# Patient Record
Sex: FEMALE | Race: BLACK OR AFRICAN AMERICAN | Employment: OTHER | URBAN - METROPOLITAN AREA
[De-identification: names, ages, dates, MRNs, and addresses within clinical notes are randomized per-mention and may not be internally consistent; named-entity substitution may affect disease eponyms.]

---

## 2017-01-13 ENCOUNTER — OFFICE VISIT (OUTPATIENT)
Dept: PRIMARY CARE CLINIC | Age: 82
End: 2017-01-13

## 2017-01-13 VITALS
SYSTOLIC BLOOD PRESSURE: 208 MMHG | HEART RATE: 80 BPM | BODY MASS INDEX: 22.1 KG/M2 | OXYGEN SATURATION: 97 % | DIASTOLIC BLOOD PRESSURE: 97 MMHG | TEMPERATURE: 97.5 F | WEIGHT: 132.8 LBS

## 2017-01-13 DIAGNOSIS — M81.0 OSTEOPOROSIS: ICD-10-CM

## 2017-01-13 DIAGNOSIS — I10 ESSENTIAL HYPERTENSION: ICD-10-CM

## 2017-01-13 DIAGNOSIS — I10 UNCONTROLLED HYPERTENSION: Primary | ICD-10-CM

## 2017-01-13 DIAGNOSIS — R22.31 AXILLARY MASS, RIGHT: ICD-10-CM

## 2017-01-13 PROCEDURE — G8399 PT W/DXA RESULTS DOCUMENT: HCPCS | Performed by: FAMILY MEDICINE

## 2017-01-13 PROCEDURE — 4040F PNEUMOC VAC/ADMIN/RCVD: CPT | Performed by: FAMILY MEDICINE

## 2017-01-13 PROCEDURE — 1123F ACP DISCUSS/DSCN MKR DOCD: CPT | Performed by: FAMILY MEDICINE

## 2017-01-13 PROCEDURE — G8427 DOCREV CUR MEDS BY ELIG CLIN: HCPCS | Performed by: FAMILY MEDICINE

## 2017-01-13 PROCEDURE — 1090F PRES/ABSN URINE INCON ASSESS: CPT | Performed by: FAMILY MEDICINE

## 2017-01-13 PROCEDURE — G8419 CALC BMI OUT NRM PARAM NOF/U: HCPCS | Performed by: FAMILY MEDICINE

## 2017-01-13 PROCEDURE — 99214 OFFICE O/P EST MOD 30 MIN: CPT | Performed by: FAMILY MEDICINE

## 2017-01-13 PROCEDURE — 1036F TOBACCO NON-USER: CPT | Performed by: FAMILY MEDICINE

## 2017-01-13 PROCEDURE — G8484 FLU IMMUNIZE NO ADMIN: HCPCS | Performed by: FAMILY MEDICINE

## 2017-01-13 RX ORDER — LOSARTAN POTASSIUM 100 MG/1
100 TABLET ORAL DAILY
Qty: 90 TABLET | Refills: 0 | Status: SHIPPED | OUTPATIENT
Start: 2017-01-13 | End: 2017-04-24 | Stop reason: SDUPTHER

## 2017-01-13 RX ORDER — METOPROLOL SUCCINATE 50 MG/1
50 TABLET, EXTENDED RELEASE ORAL DAILY
Qty: 30 TABLET | Refills: 3 | Status: SHIPPED | OUTPATIENT
Start: 2017-01-13 | End: 2017-02-10 | Stop reason: SDUPTHER

## 2017-01-13 RX ORDER — ALENDRONATE SODIUM 70 MG/1
70 TABLET ORAL
Qty: 4 TABLET | Refills: 3 | Status: SHIPPED | OUTPATIENT
Start: 2017-01-13 | End: 2017-05-06 | Stop reason: SDUPTHER

## 2017-01-13 ASSESSMENT — ENCOUNTER SYMPTOMS
RECTAL PAIN: 0
RHINORRHEA: 0
EYE PAIN: 0
WHEEZING: 0
CHOKING: 0
STRIDOR: 0
SHORTNESS OF BREATH: 0
BLOOD IN STOOL: 0
ABDOMINAL DISTENTION: 0
APNEA: 0
TROUBLE SWALLOWING: 0
CHEST TIGHTNESS: 0
NAUSEA: 0
SINUS PRESSURE: 0
EYE ITCHING: 0
EYE DISCHARGE: 0
PHOTOPHOBIA: 0
CONSTIPATION: 0
VOMITING: 0
BACK PAIN: 0
COUGH: 0
SORE THROAT: 0
COLOR CHANGE: 0
DIARRHEA: 0
EYE REDNESS: 0

## 2017-01-13 ASSESSMENT — PATIENT HEALTH QUESTIONNAIRE - PHQ9
1. LITTLE INTEREST OR PLEASURE IN DOING THINGS: 0
SUM OF ALL RESPONSES TO PHQ QUESTIONS 1-9: 0
2. FEELING DOWN, DEPRESSED OR HOPELESS: 0
SUM OF ALL RESPONSES TO PHQ9 QUESTIONS 1 & 2: 0

## 2017-02-10 ENCOUNTER — OFFICE VISIT (OUTPATIENT)
Dept: PRIMARY CARE CLINIC | Age: 82
End: 2017-02-10

## 2017-02-10 VITALS
OXYGEN SATURATION: 97 % | WEIGHT: 131.4 LBS | BODY MASS INDEX: 21.87 KG/M2 | SYSTOLIC BLOOD PRESSURE: 170 MMHG | HEART RATE: 75 BPM | DIASTOLIC BLOOD PRESSURE: 90 MMHG

## 2017-02-10 DIAGNOSIS — I10 UNCONTROLLED HYPERTENSION: ICD-10-CM

## 2017-02-10 PROCEDURE — 1090F PRES/ABSN URINE INCON ASSESS: CPT | Performed by: FAMILY MEDICINE

## 2017-02-10 PROCEDURE — 1036F TOBACCO NON-USER: CPT | Performed by: FAMILY MEDICINE

## 2017-02-10 PROCEDURE — G8419 CALC BMI OUT NRM PARAM NOF/U: HCPCS | Performed by: FAMILY MEDICINE

## 2017-02-10 PROCEDURE — G8427 DOCREV CUR MEDS BY ELIG CLIN: HCPCS | Performed by: FAMILY MEDICINE

## 2017-02-10 PROCEDURE — G8399 PT W/DXA RESULTS DOCUMENT: HCPCS | Performed by: FAMILY MEDICINE

## 2017-02-10 PROCEDURE — 99213 OFFICE O/P EST LOW 20 MIN: CPT | Performed by: FAMILY MEDICINE

## 2017-02-10 PROCEDURE — 4040F PNEUMOC VAC/ADMIN/RCVD: CPT | Performed by: FAMILY MEDICINE

## 2017-02-10 PROCEDURE — 1123F ACP DISCUSS/DSCN MKR DOCD: CPT | Performed by: FAMILY MEDICINE

## 2017-02-10 PROCEDURE — G8484 FLU IMMUNIZE NO ADMIN: HCPCS | Performed by: FAMILY MEDICINE

## 2017-02-10 RX ORDER — METOPROLOL SUCCINATE 50 MG/1
TABLET, EXTENDED RELEASE ORAL
Qty: 60 TABLET | Refills: 3 | Status: SHIPPED | OUTPATIENT
Start: 2017-02-10 | End: 2017-06-01 | Stop reason: SDUPTHER

## 2017-02-10 ASSESSMENT — ENCOUNTER SYMPTOMS
BLOOD IN STOOL: 0
EYE PAIN: 0
COUGH: 0
RHINORRHEA: 0
EYE ITCHING: 0
COLOR CHANGE: 0
BACK PAIN: 0
DIARRHEA: 0
NAUSEA: 0
STRIDOR: 0
VOMITING: 0
SHORTNESS OF BREATH: 0
ABDOMINAL DISTENTION: 0
EYE DISCHARGE: 0
SINUS PRESSURE: 0
WHEEZING: 0
APNEA: 0
SORE THROAT: 0
CHOKING: 0
RECTAL PAIN: 0
CONSTIPATION: 0
TROUBLE SWALLOWING: 0
PHOTOPHOBIA: 0
EYE REDNESS: 0
CHEST TIGHTNESS: 0

## 2017-03-10 ENCOUNTER — OFFICE VISIT (OUTPATIENT)
Dept: PRIMARY CARE CLINIC | Age: 82
End: 2017-03-10

## 2017-03-10 VITALS
DIASTOLIC BLOOD PRESSURE: 60 MMHG | BODY MASS INDEX: 21.83 KG/M2 | OXYGEN SATURATION: 97 % | SYSTOLIC BLOOD PRESSURE: 180 MMHG | HEART RATE: 74 BPM | WEIGHT: 131.2 LBS

## 2017-03-10 DIAGNOSIS — I10 UNCONTROLLED HYPERTENSION: Primary | ICD-10-CM

## 2017-03-10 PROCEDURE — 1036F TOBACCO NON-USER: CPT | Performed by: FAMILY MEDICINE

## 2017-03-10 PROCEDURE — G8427 DOCREV CUR MEDS BY ELIG CLIN: HCPCS | Performed by: FAMILY MEDICINE

## 2017-03-10 PROCEDURE — 99213 OFFICE O/P EST LOW 20 MIN: CPT | Performed by: FAMILY MEDICINE

## 2017-03-10 PROCEDURE — G8399 PT W/DXA RESULTS DOCUMENT: HCPCS | Performed by: FAMILY MEDICINE

## 2017-03-10 PROCEDURE — 1090F PRES/ABSN URINE INCON ASSESS: CPT | Performed by: FAMILY MEDICINE

## 2017-03-10 PROCEDURE — G8484 FLU IMMUNIZE NO ADMIN: HCPCS | Performed by: FAMILY MEDICINE

## 2017-03-10 PROCEDURE — 4040F PNEUMOC VAC/ADMIN/RCVD: CPT | Performed by: FAMILY MEDICINE

## 2017-03-10 PROCEDURE — G8419 CALC BMI OUT NRM PARAM NOF/U: HCPCS | Performed by: FAMILY MEDICINE

## 2017-03-10 PROCEDURE — 1123F ACP DISCUSS/DSCN MKR DOCD: CPT | Performed by: FAMILY MEDICINE

## 2017-03-10 RX ORDER — CHLORTHALIDONE 25 MG/1
TABLET ORAL
Qty: 30 TABLET | Refills: 3 | Status: SHIPPED | OUTPATIENT
Start: 2017-03-10 | End: 2017-06-30 | Stop reason: SDUPTHER

## 2017-03-10 ASSESSMENT — ENCOUNTER SYMPTOMS
WHEEZING: 0
ABDOMINAL DISTENTION: 0
CHEST TIGHTNESS: 0
DIARRHEA: 0
RECTAL PAIN: 0
NAUSEA: 0
EYE ITCHING: 0
CONSTIPATION: 0
BLURRED VISION: 0
EYE DISCHARGE: 0
PHOTOPHOBIA: 0
STRIDOR: 0
EYE REDNESS: 0
SINUS PRESSURE: 0
COLOR CHANGE: 0
CHOKING: 0
COUGH: 0
TROUBLE SWALLOWING: 0
VOMITING: 0
APNEA: 0
ORTHOPNEA: 0
EYE PAIN: 0
BACK PAIN: 0
SORE THROAT: 0
BLOOD IN STOOL: 0
RHINORRHEA: 0
SHORTNESS OF BREATH: 0

## 2017-04-24 DIAGNOSIS — I10 UNCONTROLLED HYPERTENSION: ICD-10-CM

## 2017-04-25 RX ORDER — LOSARTAN POTASSIUM 100 MG/1
100 TABLET ORAL DAILY
Qty: 90 TABLET | Refills: 0 | Status: SHIPPED | OUTPATIENT
Start: 2017-04-25 | End: 2017-06-30 | Stop reason: SDUPTHER

## 2017-04-28 ENCOUNTER — OFFICE VISIT (OUTPATIENT)
Dept: PRIMARY CARE CLINIC | Age: 82
End: 2017-04-28

## 2017-04-28 VITALS
BODY MASS INDEX: 21.8 KG/M2 | DIASTOLIC BLOOD PRESSURE: 78 MMHG | SYSTOLIC BLOOD PRESSURE: 178 MMHG | HEART RATE: 79 BPM | OXYGEN SATURATION: 98 % | WEIGHT: 131 LBS

## 2017-04-28 DIAGNOSIS — M75.51 BURSITIS, SHOULDER, RIGHT: Primary | ICD-10-CM

## 2017-04-28 DIAGNOSIS — I10 ESSENTIAL HYPERTENSION: ICD-10-CM

## 2017-04-28 PROCEDURE — 1090F PRES/ABSN URINE INCON ASSESS: CPT | Performed by: FAMILY MEDICINE

## 2017-04-28 PROCEDURE — 99213 OFFICE O/P EST LOW 20 MIN: CPT | Performed by: FAMILY MEDICINE

## 2017-04-28 PROCEDURE — G8427 DOCREV CUR MEDS BY ELIG CLIN: HCPCS | Performed by: FAMILY MEDICINE

## 2017-04-28 PROCEDURE — 4040F PNEUMOC VAC/ADMIN/RCVD: CPT | Performed by: FAMILY MEDICINE

## 2017-04-28 PROCEDURE — 1036F TOBACCO NON-USER: CPT | Performed by: FAMILY MEDICINE

## 2017-04-28 PROCEDURE — G8399 PT W/DXA RESULTS DOCUMENT: HCPCS | Performed by: FAMILY MEDICINE

## 2017-04-28 PROCEDURE — 1123F ACP DISCUSS/DSCN MKR DOCD: CPT | Performed by: FAMILY MEDICINE

## 2017-04-28 PROCEDURE — G8419 CALC BMI OUT NRM PARAM NOF/U: HCPCS | Performed by: FAMILY MEDICINE

## 2017-04-28 ASSESSMENT — ENCOUNTER SYMPTOMS
WHEEZING: 0
PHOTOPHOBIA: 0
CHEST TIGHTNESS: 0
DIARRHEA: 0
SINUS PRESSURE: 0
VOMITING: 0
STRIDOR: 0
SORE THROAT: 0
RECTAL PAIN: 0
ABDOMINAL DISTENTION: 0
RHINORRHEA: 0
NAUSEA: 0
COLOR CHANGE: 0
TROUBLE SWALLOWING: 0
COUGH: 0
EYE ITCHING: 0
APNEA: 0
SHORTNESS OF BREATH: 0
EYE REDNESS: 0
CHOKING: 0
BLOOD IN STOOL: 0
EYE DISCHARGE: 0
BACK PAIN: 0
EYE PAIN: 0
CONSTIPATION: 0

## 2017-05-25 ENCOUNTER — OFFICE VISIT (OUTPATIENT)
Dept: PRIMARY CARE CLINIC | Age: 82
End: 2017-05-25

## 2017-05-25 ENCOUNTER — OFFICE VISIT (OUTPATIENT)
Dept: ORTHOPEDIC SURGERY | Age: 82
End: 2017-05-25

## 2017-05-25 VITALS
OXYGEN SATURATION: 98 % | TEMPERATURE: 97.3 F | RESPIRATION RATE: 18 BRPM | DIASTOLIC BLOOD PRESSURE: 82 MMHG | BODY MASS INDEX: 22.53 KG/M2 | HEIGHT: 64 IN | SYSTOLIC BLOOD PRESSURE: 170 MMHG | WEIGHT: 132 LBS | HEART RATE: 57 BPM

## 2017-05-25 VITALS
WEIGHT: 132 LBS | DIASTOLIC BLOOD PRESSURE: 106 MMHG | BODY MASS INDEX: 23.39 KG/M2 | HEART RATE: 73 BPM | HEIGHT: 63 IN | RESPIRATION RATE: 16 BRPM | TEMPERATURE: 97.8 F | SYSTOLIC BLOOD PRESSURE: 224 MMHG

## 2017-05-25 DIAGNOSIS — G89.29 CHRONIC RIGHT SHOULDER PAIN: ICD-10-CM

## 2017-05-25 DIAGNOSIS — M25.511 CHRONIC RIGHT SHOULDER PAIN: ICD-10-CM

## 2017-05-25 DIAGNOSIS — I10 ESSENTIAL HYPERTENSION: Primary | ICD-10-CM

## 2017-05-25 DIAGNOSIS — M25.511 ACUTE PAIN OF RIGHT SHOULDER: Primary | ICD-10-CM

## 2017-05-25 DIAGNOSIS — I10 ESSENTIAL HYPERTENSION: ICD-10-CM

## 2017-05-25 LAB
A/G RATIO: 1.4 (ref 1.1–2.2)
ALBUMIN SERPL-MCNC: 4.7 G/DL (ref 3.4–5)
ALP BLD-CCNC: 67 U/L (ref 40–129)
ALT SERPL-CCNC: 17 U/L (ref 10–40)
ANION GAP SERPL CALCULATED.3IONS-SCNC: 16 MMOL/L (ref 3–16)
AST SERPL-CCNC: 24 U/L (ref 15–37)
BASOPHILS ABSOLUTE: 0 K/UL (ref 0–0.2)
BASOPHILS RELATIVE PERCENT: 0.5 %
BILIRUB SERPL-MCNC: 0.4 MG/DL (ref 0–1)
BILIRUBIN URINE: NEGATIVE
BLOOD, URINE: NEGATIVE
BUN BLDV-MCNC: 14 MG/DL (ref 7–20)
CALCIUM SERPL-MCNC: 9.6 MG/DL (ref 8.3–10.6)
CHLORIDE BLD-SCNC: 104 MMOL/L (ref 99–110)
CLARITY: CLEAR
CO2: 25 MMOL/L (ref 21–32)
COLOR: YELLOW
CREAT SERPL-MCNC: 0.7 MG/DL (ref 0.6–1.2)
EOSINOPHILS ABSOLUTE: 0.2 K/UL (ref 0–0.6)
EOSINOPHILS RELATIVE PERCENT: 3.6 %
GFR AFRICAN AMERICAN: >60
GFR NON-AFRICAN AMERICAN: >60
GLOBULIN: 3.4 G/DL
GLUCOSE BLD-MCNC: 91 MG/DL (ref 70–99)
GLUCOSE URINE: NEGATIVE MG/DL
HCT VFR BLD CALC: 44.6 % (ref 36–48)
HEMOGLOBIN: 14.2 G/DL (ref 12–16)
KETONES, URINE: NEGATIVE MG/DL
LEUKOCYTE ESTERASE, URINE: NEGATIVE
LYMPHOCYTES ABSOLUTE: 1.7 K/UL (ref 1–5.1)
LYMPHOCYTES RELATIVE PERCENT: 36.3 %
MAGNESIUM: 2.6 MG/DL (ref 1.8–2.4)
MCH RBC QN AUTO: 27 PG (ref 26–34)
MCHC RBC AUTO-ENTMCNC: 31.7 G/DL (ref 31–36)
MCV RBC AUTO: 85.3 FL (ref 80–100)
MICROSCOPIC EXAMINATION: NORMAL
MONOCYTES ABSOLUTE: 0.4 K/UL (ref 0–1.3)
MONOCYTES RELATIVE PERCENT: 8.8 %
NEUTROPHILS ABSOLUTE: 2.3 K/UL (ref 1.7–7.7)
NEUTROPHILS RELATIVE PERCENT: 50.8 %
NITRITE, URINE: NEGATIVE
PDW BLD-RTO: 15.3 % (ref 12.4–15.4)
PH UA: 6.5
PLATELET # BLD: 216 K/UL (ref 135–450)
PMV BLD AUTO: 9.6 FL (ref 5–10.5)
POTASSIUM SERPL-SCNC: 4.5 MMOL/L (ref 3.5–5.1)
PROTEIN UA: NEGATIVE MG/DL
RBC # BLD: 5.23 M/UL (ref 4–5.2)
SODIUM BLD-SCNC: 145 MMOL/L (ref 136–145)
SPECIFIC GRAVITY UA: 1.01
TOTAL PROTEIN: 8.1 G/DL (ref 6.4–8.2)
TSH REFLEX FT4: 1.99 UIU/ML (ref 0.27–4.2)
URINE TYPE: NORMAL
UROBILINOGEN, URINE: 0.2 E.U./DL
WBC # BLD: 4.6 K/UL (ref 4–11)

## 2017-05-25 PROCEDURE — 1123F ACP DISCUSS/DSCN MKR DOCD: CPT | Performed by: INTERNAL MEDICINE

## 2017-05-25 PROCEDURE — G8399 PT W/DXA RESULTS DOCUMENT: HCPCS | Performed by: INTERNAL MEDICINE

## 2017-05-25 PROCEDURE — G8427 DOCREV CUR MEDS BY ELIG CLIN: HCPCS | Performed by: INTERNAL MEDICINE

## 2017-05-25 PROCEDURE — G8399 PT W/DXA RESULTS DOCUMENT: HCPCS | Performed by: PHYSICIAN ASSISTANT

## 2017-05-25 PROCEDURE — G8419 CALC BMI OUT NRM PARAM NOF/U: HCPCS | Performed by: INTERNAL MEDICINE

## 2017-05-25 PROCEDURE — 99213 OFFICE O/P EST LOW 20 MIN: CPT | Performed by: INTERNAL MEDICINE

## 2017-05-25 PROCEDURE — 4040F PNEUMOC VAC/ADMIN/RCVD: CPT | Performed by: PHYSICIAN ASSISTANT

## 2017-05-25 PROCEDURE — G8427 DOCREV CUR MEDS BY ELIG CLIN: HCPCS | Performed by: PHYSICIAN ASSISTANT

## 2017-05-25 PROCEDURE — 4040F PNEUMOC VAC/ADMIN/RCVD: CPT | Performed by: INTERNAL MEDICINE

## 2017-05-25 PROCEDURE — G8419 CALC BMI OUT NRM PARAM NOF/U: HCPCS | Performed by: PHYSICIAN ASSISTANT

## 2017-05-25 PROCEDURE — 1123F ACP DISCUSS/DSCN MKR DOCD: CPT | Performed by: PHYSICIAN ASSISTANT

## 2017-05-25 PROCEDURE — 20610 DRAIN/INJ JOINT/BURSA W/O US: CPT | Performed by: PHYSICIAN ASSISTANT

## 2017-05-25 PROCEDURE — 1036F TOBACCO NON-USER: CPT | Performed by: INTERNAL MEDICINE

## 2017-05-25 PROCEDURE — 1090F PRES/ABSN URINE INCON ASSESS: CPT | Performed by: INTERNAL MEDICINE

## 2017-05-25 PROCEDURE — 99202 OFFICE O/P NEW SF 15 MIN: CPT | Performed by: PHYSICIAN ASSISTANT

## 2017-05-25 PROCEDURE — 1036F TOBACCO NON-USER: CPT | Performed by: PHYSICIAN ASSISTANT

## 2017-05-25 PROCEDURE — 1090F PRES/ABSN URINE INCON ASSESS: CPT | Performed by: PHYSICIAN ASSISTANT

## 2017-05-25 ASSESSMENT — ENCOUNTER SYMPTOMS
RESPIRATORY NEGATIVE: 1
FACIAL SWELLING: 0
BLURRED VISION: 0
EYES NEGATIVE: 1
ORTHOPNEA: 0
SHORTNESS OF BREATH: 0

## 2017-05-27 PROBLEM — M25.511 ACUTE PAIN OF RIGHT SHOULDER: Status: ACTIVE | Noted: 2017-05-27

## 2017-05-31 DIAGNOSIS — I10 UNCONTROLLED HYPERTENSION: ICD-10-CM

## 2017-06-02 RX ORDER — METOPROLOL SUCCINATE 50 MG/1
TABLET, EXTENDED RELEASE ORAL
Qty: 60 TABLET | Refills: 3 | Status: SHIPPED | OUTPATIENT
Start: 2017-06-02 | End: 2017-09-22 | Stop reason: SDUPTHER

## 2017-06-09 ENCOUNTER — TELEPHONE (OUTPATIENT)
Dept: PRIMARY CARE CLINIC | Age: 82
End: 2017-06-09

## 2017-06-10 ENCOUNTER — TELEPHONE (OUTPATIENT)
Dept: PRIMARY CARE CLINIC | Age: 82
End: 2017-06-10

## 2017-06-10 DIAGNOSIS — E55.9 VITAMIN D DEFICIENCY: Primary | ICD-10-CM

## 2017-06-30 ENCOUNTER — OFFICE VISIT (OUTPATIENT)
Dept: PRIMARY CARE CLINIC | Age: 82
End: 2017-06-30

## 2017-06-30 VITALS
WEIGHT: 127.4 LBS | TEMPERATURE: 97.6 F | SYSTOLIC BLOOD PRESSURE: 145 MMHG | HEART RATE: 79 BPM | BODY MASS INDEX: 21.87 KG/M2 | OXYGEN SATURATION: 97 % | DIASTOLIC BLOOD PRESSURE: 85 MMHG | RESPIRATION RATE: 14 BRPM

## 2017-06-30 DIAGNOSIS — R63.4 WEIGHT LOSS: Primary | ICD-10-CM

## 2017-06-30 DIAGNOSIS — I10 ESSENTIAL HYPERTENSION: ICD-10-CM

## 2017-06-30 DIAGNOSIS — I10 UNCONTROLLED HYPERTENSION: ICD-10-CM

## 2017-06-30 DIAGNOSIS — R22.31 AXILLARY MASS, RIGHT: ICD-10-CM

## 2017-06-30 DIAGNOSIS — M81.0 OSTEOPOROSIS: ICD-10-CM

## 2017-06-30 LAB
BASOPHILS ABSOLUTE: 0 K/UL (ref 0–0.2)
BASOPHILS RELATIVE PERCENT: 0.5 %
EOSINOPHILS ABSOLUTE: 0.1 K/UL (ref 0–0.6)
EOSINOPHILS RELATIVE PERCENT: 1.8 %
HCT VFR BLD CALC: 42.3 % (ref 36–48)
HEMOGLOBIN: 14 G/DL (ref 12–16)
LYMPHOCYTES ABSOLUTE: 1.8 K/UL (ref 1–5.1)
LYMPHOCYTES RELATIVE PERCENT: 37.8 %
MCH RBC QN AUTO: 27.9 PG (ref 26–34)
MCHC RBC AUTO-ENTMCNC: 33.1 G/DL (ref 31–36)
MCV RBC AUTO: 84.5 FL (ref 80–100)
MONOCYTES ABSOLUTE: 0.4 K/UL (ref 0–1.3)
MONOCYTES RELATIVE PERCENT: 8.6 %
NEUTROPHILS ABSOLUTE: 2.4 K/UL (ref 1.7–7.7)
NEUTROPHILS RELATIVE PERCENT: 51.3 %
PDW BLD-RTO: 15.4 % (ref 12.4–15.4)
PLATELET # BLD: 207 K/UL (ref 135–450)
PMV BLD AUTO: 9.6 FL (ref 5–10.5)
RBC # BLD: 5.01 M/UL (ref 4–5.2)
WBC # BLD: 4.7 K/UL (ref 4–11)

## 2017-06-30 PROCEDURE — 1090F PRES/ABSN URINE INCON ASSESS: CPT | Performed by: FAMILY MEDICINE

## 2017-06-30 PROCEDURE — G8399 PT W/DXA RESULTS DOCUMENT: HCPCS | Performed by: FAMILY MEDICINE

## 2017-06-30 PROCEDURE — 1036F TOBACCO NON-USER: CPT | Performed by: FAMILY MEDICINE

## 2017-06-30 PROCEDURE — G8427 DOCREV CUR MEDS BY ELIG CLIN: HCPCS | Performed by: FAMILY MEDICINE

## 2017-06-30 PROCEDURE — 99214 OFFICE O/P EST MOD 30 MIN: CPT | Performed by: FAMILY MEDICINE

## 2017-06-30 PROCEDURE — G8420 CALC BMI NORM PARAMETERS: HCPCS | Performed by: FAMILY MEDICINE

## 2017-06-30 PROCEDURE — 1123F ACP DISCUSS/DSCN MKR DOCD: CPT | Performed by: FAMILY MEDICINE

## 2017-06-30 PROCEDURE — 4040F PNEUMOC VAC/ADMIN/RCVD: CPT | Performed by: FAMILY MEDICINE

## 2017-06-30 PROCEDURE — 4005F PHARM THX FOR OP RXD: CPT | Performed by: FAMILY MEDICINE

## 2017-06-30 RX ORDER — ALENDRONATE SODIUM 70 MG/1
TABLET ORAL
Qty: 4 TABLET | Refills: 12 | Status: SHIPPED | OUTPATIENT
Start: 2017-06-30 | End: 2018-06-30 | Stop reason: SDUPTHER

## 2017-06-30 RX ORDER — CHLORTHALIDONE 25 MG/1
TABLET ORAL
Qty: 30 TABLET | Refills: 3 | Status: SHIPPED | OUTPATIENT
Start: 2017-06-30 | End: 2017-10-27 | Stop reason: CLARIF

## 2017-06-30 RX ORDER — LOSARTAN POTASSIUM 100 MG/1
100 TABLET ORAL DAILY
Qty: 90 TABLET | Refills: 0 | Status: SHIPPED | OUTPATIENT
Start: 2017-06-30 | End: 2017-10-18 | Stop reason: SDUPTHER

## 2017-06-30 RX ORDER — METOPROLOL SUCCINATE 50 MG/1
TABLET, EXTENDED RELEASE ORAL
Qty: 60 TABLET | Refills: 3 | Status: CANCELLED | OUTPATIENT
Start: 2017-06-30

## 2017-06-30 RX ORDER — AMLODIPINE BESYLATE 10 MG/1
10 TABLET ORAL DAILY
Qty: 90 TABLET | Refills: 1 | Status: SHIPPED | OUTPATIENT
Start: 2017-06-30 | End: 2018-06-22

## 2017-06-30 ASSESSMENT — ENCOUNTER SYMPTOMS
RECTAL PAIN: 0
VOMITING: 0
COUGH: 0
NAUSEA: 0
SHORTNESS OF BREATH: 0
EYE ITCHING: 0
TROUBLE SWALLOWING: 0
WHEEZING: 0
CHOKING: 0
RHINORRHEA: 0
EYE REDNESS: 0
CONSTIPATION: 0
EYE PAIN: 0
CHEST TIGHTNESS: 0
APNEA: 0
EYE DISCHARGE: 0
SORE THROAT: 0
SINUS PRESSURE: 0
BACK PAIN: 0
ABDOMINAL DISTENTION: 0
STRIDOR: 0
DIARRHEA: 0
COLOR CHANGE: 0
BLOOD IN STOOL: 0
PHOTOPHOBIA: 0

## 2017-07-12 ENCOUNTER — TELEPHONE (OUTPATIENT)
Dept: SURGERY | Age: 82
End: 2017-07-12

## 2017-08-18 ENCOUNTER — OFFICE VISIT (OUTPATIENT)
Dept: SURGERY | Age: 82
End: 2017-08-18

## 2017-08-18 VITALS
WEIGHT: 133 LBS | DIASTOLIC BLOOD PRESSURE: 107 MMHG | HEART RATE: 78 BPM | TEMPERATURE: 97.7 F | BODY MASS INDEX: 22.16 KG/M2 | HEIGHT: 65 IN | OXYGEN SATURATION: 97 % | SYSTOLIC BLOOD PRESSURE: 205 MMHG

## 2017-08-18 DIAGNOSIS — R22.31 AXILLARY MASS, RIGHT: Primary | ICD-10-CM

## 2017-08-18 PROCEDURE — 4040F PNEUMOC VAC/ADMIN/RCVD: CPT | Performed by: SURGERY

## 2017-08-18 PROCEDURE — 1090F PRES/ABSN URINE INCON ASSESS: CPT | Performed by: SURGERY

## 2017-08-18 PROCEDURE — 1123F ACP DISCUSS/DSCN MKR DOCD: CPT | Performed by: SURGERY

## 2017-08-18 PROCEDURE — G8427 DOCREV CUR MEDS BY ELIG CLIN: HCPCS | Performed by: SURGERY

## 2017-08-18 PROCEDURE — 1036F TOBACCO NON-USER: CPT | Performed by: SURGERY

## 2017-08-18 PROCEDURE — G8399 PT W/DXA RESULTS DOCUMENT: HCPCS | Performed by: SURGERY

## 2017-08-18 PROCEDURE — G8420 CALC BMI NORM PARAMETERS: HCPCS | Performed by: SURGERY

## 2017-08-18 PROCEDURE — 99213 OFFICE O/P EST LOW 20 MIN: CPT | Performed by: SURGERY

## 2017-08-31 ENCOUNTER — TELEPHONE (OUTPATIENT)
Dept: SURGERY | Age: 82
End: 2017-08-31

## 2017-09-15 ENCOUNTER — HOSPITAL ENCOUNTER (OUTPATIENT)
Dept: ULTRASOUND IMAGING | Age: 82
Discharge: HOME | End: 2017-09-21
Attending: SURGERY | Admitting: SURGERY

## 2017-09-15 DIAGNOSIS — R22.31 LOCALIZED SWELLING, MASS AND LUMP, RIGHT UPPER LIMB: ICD-10-CM

## 2017-09-15 DIAGNOSIS — R22.31 AXILLARY MASS, RIGHT: ICD-10-CM

## 2017-09-22 ENCOUNTER — OFFICE VISIT (OUTPATIENT)
Dept: PRIMARY CARE CLINIC | Age: 82
End: 2017-09-22

## 2017-09-22 VITALS
OXYGEN SATURATION: 99 % | SYSTOLIC BLOOD PRESSURE: 218 MMHG | BODY MASS INDEX: 22.26 KG/M2 | HEIGHT: 65 IN | HEART RATE: 74 BPM | DIASTOLIC BLOOD PRESSURE: 92 MMHG | WEIGHT: 133.6 LBS

## 2017-09-22 DIAGNOSIS — I10 UNCONTROLLED HYPERTENSION: Primary | ICD-10-CM

## 2017-09-22 DIAGNOSIS — F03.90 DEMENTIA WITHOUT BEHAVIORAL DISTURBANCE, UNSPECIFIED DEMENTIA TYPE: ICD-10-CM

## 2017-09-22 PROCEDURE — 4040F PNEUMOC VAC/ADMIN/RCVD: CPT | Performed by: FAMILY MEDICINE

## 2017-09-22 PROCEDURE — 1090F PRES/ABSN URINE INCON ASSESS: CPT | Performed by: FAMILY MEDICINE

## 2017-09-22 PROCEDURE — G8427 DOCREV CUR MEDS BY ELIG CLIN: HCPCS | Performed by: FAMILY MEDICINE

## 2017-09-22 PROCEDURE — G8399 PT W/DXA RESULTS DOCUMENT: HCPCS | Performed by: FAMILY MEDICINE

## 2017-09-22 PROCEDURE — 99214 OFFICE O/P EST MOD 30 MIN: CPT | Performed by: FAMILY MEDICINE

## 2017-09-22 PROCEDURE — G8420 CALC BMI NORM PARAMETERS: HCPCS | Performed by: FAMILY MEDICINE

## 2017-09-22 PROCEDURE — 1123F ACP DISCUSS/DSCN MKR DOCD: CPT | Performed by: FAMILY MEDICINE

## 2017-09-22 PROCEDURE — 1036F TOBACCO NON-USER: CPT | Performed by: FAMILY MEDICINE

## 2017-09-22 RX ORDER — METOPROLOL SUCCINATE 50 MG/1
TABLET, EXTENDED RELEASE ORAL
Qty: 180 TABLET | Refills: 3 | Status: SHIPPED | OUTPATIENT
Start: 2017-09-22 | End: 2018-12-21 | Stop reason: SDUPTHER

## 2017-09-22 RX ORDER — DONEPEZIL HYDROCHLORIDE 5 MG/1
5 TABLET, FILM COATED ORAL NIGHTLY
Qty: 30 TABLET | Refills: 0 | Status: SHIPPED | OUTPATIENT
Start: 2017-09-22 | End: 2017-10-27 | Stop reason: SDUPTHER

## 2017-09-22 ASSESSMENT — ENCOUNTER SYMPTOMS
BLOOD IN STOOL: 0
VOMITING: 0
CHEST TIGHTNESS: 0
SINUS PRESSURE: 0
RHINORRHEA: 0
TROUBLE SWALLOWING: 0
COUGH: 0
STRIDOR: 0
ABDOMINAL DISTENTION: 0
EYE PAIN: 0
SHORTNESS OF BREATH: 0
SORE THROAT: 0
PHOTOPHOBIA: 0
DIARRHEA: 0
COLOR CHANGE: 0
NAUSEA: 0
EYE REDNESS: 0
EYE DISCHARGE: 0
CONSTIPATION: 0
APNEA: 0
BACK PAIN: 0
CHOKING: 0
WHEEZING: 0
RECTAL PAIN: 0
EYE ITCHING: 0

## 2017-10-18 DIAGNOSIS — I10 UNCONTROLLED HYPERTENSION: ICD-10-CM

## 2017-10-18 RX ORDER — LOSARTAN POTASSIUM 100 MG/1
TABLET ORAL
Qty: 90 TABLET | Refills: 0 | Status: SHIPPED | OUTPATIENT
Start: 2017-10-18 | End: 2018-01-21 | Stop reason: SDUPTHER

## 2017-10-27 ENCOUNTER — OFFICE VISIT (OUTPATIENT)
Dept: PRIMARY CARE CLINIC | Age: 82
End: 2017-10-27

## 2017-10-27 VITALS
TEMPERATURE: 97.4 F | WEIGHT: 131 LBS | SYSTOLIC BLOOD PRESSURE: 164 MMHG | HEART RATE: 100 BPM | BODY MASS INDEX: 21.8 KG/M2 | DIASTOLIC BLOOD PRESSURE: 84 MMHG

## 2017-10-27 DIAGNOSIS — F03.90 DEMENTIA WITHOUT BEHAVIORAL DISTURBANCE, UNSPECIFIED DEMENTIA TYPE: ICD-10-CM

## 2017-10-27 DIAGNOSIS — I10 ESSENTIAL HYPERTENSION: Primary | ICD-10-CM

## 2017-10-27 PROCEDURE — G8399 PT W/DXA RESULTS DOCUMENT: HCPCS | Performed by: FAMILY MEDICINE

## 2017-10-27 PROCEDURE — G8420 CALC BMI NORM PARAMETERS: HCPCS | Performed by: FAMILY MEDICINE

## 2017-10-27 PROCEDURE — 1090F PRES/ABSN URINE INCON ASSESS: CPT | Performed by: FAMILY MEDICINE

## 2017-10-27 PROCEDURE — 99213 OFFICE O/P EST LOW 20 MIN: CPT | Performed by: FAMILY MEDICINE

## 2017-10-27 PROCEDURE — G8484 FLU IMMUNIZE NO ADMIN: HCPCS | Performed by: FAMILY MEDICINE

## 2017-10-27 PROCEDURE — 1123F ACP DISCUSS/DSCN MKR DOCD: CPT | Performed by: FAMILY MEDICINE

## 2017-10-27 PROCEDURE — 1036F TOBACCO NON-USER: CPT | Performed by: FAMILY MEDICINE

## 2017-10-27 PROCEDURE — G8427 DOCREV CUR MEDS BY ELIG CLIN: HCPCS | Performed by: FAMILY MEDICINE

## 2017-10-27 PROCEDURE — 4040F PNEUMOC VAC/ADMIN/RCVD: CPT | Performed by: FAMILY MEDICINE

## 2017-10-27 RX ORDER — HYDROCHLOROTHIAZIDE 12.5 MG/1
12.5 CAPSULE, GELATIN COATED ORAL EVERY MORNING
Qty: 30 CAPSULE | Refills: 5 | Status: SHIPPED | OUTPATIENT
Start: 2017-10-27 | End: 2018-03-16 | Stop reason: DRUGHIGH

## 2017-10-27 RX ORDER — DONEPEZIL HYDROCHLORIDE 5 MG/1
TABLET, FILM COATED ORAL
Qty: 30 TABLET | Refills: 2 | Status: SHIPPED | OUTPATIENT
Start: 2017-10-27 | End: 2018-06-22

## 2017-10-27 ASSESSMENT — ENCOUNTER SYMPTOMS
BLOOD IN STOOL: 0
COLOR CHANGE: 0
EYE DISCHARGE: 0
NAUSEA: 0
SORE THROAT: 0
SHORTNESS OF BREATH: 0
EYE PAIN: 0
CONSTIPATION: 0
SINUS PRESSURE: 0
WHEEZING: 0
APNEA: 0
COUGH: 0
BACK PAIN: 0
VOMITING: 0
CHEST TIGHTNESS: 0
RECTAL PAIN: 0
ABDOMINAL DISTENTION: 0
TROUBLE SWALLOWING: 0
RHINORRHEA: 0
EYE ITCHING: 0
CHOKING: 0
PHOTOPHOBIA: 0
DIARRHEA: 0
EYE REDNESS: 0
STRIDOR: 0

## 2017-10-27 NOTE — PROGRESS NOTES
Subjective:      Patient ID: Audra Mendoza is a 80 y.o. female. The chief complaint(s)  Fu for bp, dementia  HPI  Fu for somewhat difficult to control bp  No ha or sob or cp today    Fu for dementia  aricept 5 mg when taken hs keeps her awak  Review of Systems   Constitutional: Negative for activity change, appetite change, chills, diaphoresis, fatigue and fever. HENT: Negative for congestion, dental problem, drooling, ear discharge, ear pain, hearing loss, mouth sores, nosebleeds, postnasal drip, rhinorrhea, sinus pressure, sneezing, sore throat, tinnitus and trouble swallowing. Eyes: Negative for photophobia, pain, discharge, redness, itching and visual disturbance. Respiratory: Negative for apnea, cough, choking, chest tightness, shortness of breath, wheezing and stridor. Cardiovascular: Negative for chest pain, palpitations and leg swelling. Gastrointestinal: Negative for abdominal distention, blood in stool, constipation, diarrhea, nausea, rectal pain and vomiting. Genitourinary: Negative for decreased urine volume, difficulty urinating, dyspareunia, dysuria, enuresis, flank pain, frequency, genital sores, hematuria, menstrual problem, pelvic pain, urgency, vaginal bleeding and vaginal pain. Musculoskeletal: Negative for arthralgias, back pain, gait problem, joint swelling, myalgias, neck pain and neck stiffness. Skin: Negative for color change, pallor, rash and wound. Neurological: Negative for dizziness, tremors, seizures, syncope, facial asymmetry, speech difficulty, weakness, light-headedness, numbness and headaches. Hematological: Negative for adenopathy. Does not bruise/bleed easily. Psychiatric/Behavioral: Negative for agitation, behavioral problems, confusion, decreased concentration, dysphoric mood, hallucinations, self-injury, sleep disturbance and suicidal ideas. The patient is not nervous/anxious and is not hyperactive.         Objective:   Physical Exam   Constitutional: She is oriented to person, place, and time. Cardiovascular: Normal rate, regular rhythm, normal heart sounds and intact distal pulses. Exam reveals no gallop and no friction rub. No murmur heard. Pulmonary/Chest: Effort normal and breath sounds normal. No respiratory distress. She has no wheezes. She has no rales. She exhibits no tenderness. Neurological: She is oriented to person, place, and time. She has normal reflexes. She displays normal reflexes. No cranial nerve deficit. Coordination normal.   Nursing note and vitals reviewed. Assessment:      1. Essential hypertension  Not quite at goal  Less 150/90  Add hctz 12.5 mg    2. Dementia without behavioral disturbance, unspecified dementia type  Take aricept in am  - donepezil (ARICEPT) 5 MG tablet; Take one tablet in daytime  Dispense: 30 tablet;  Refill: 2          Plan:            See me in 2 months

## 2017-12-22 ENCOUNTER — OFFICE VISIT (OUTPATIENT)
Dept: PRIMARY CARE CLINIC | Age: 82
End: 2017-12-22

## 2017-12-22 VITALS
SYSTOLIC BLOOD PRESSURE: 142 MMHG | DIASTOLIC BLOOD PRESSURE: 78 MMHG | BODY MASS INDEX: 21.38 KG/M2 | HEART RATE: 88 BPM | TEMPERATURE: 98.6 F | OXYGEN SATURATION: 96 % | WEIGHT: 133 LBS | HEIGHT: 66 IN

## 2017-12-22 DIAGNOSIS — Z23 NEED FOR PROPHYLACTIC VACCINATION AGAINST STREPTOCOCCUS PNEUMONIAE (PNEUMOCOCCUS): ICD-10-CM

## 2017-12-22 DIAGNOSIS — G30.1 LATE ONSET ALZHEIMER'S DISEASE WITHOUT BEHAVIORAL DISTURBANCE (HCC): ICD-10-CM

## 2017-12-22 DIAGNOSIS — Z23 NEED FOR PROPHYLACTIC VACCINATION AGAINST DIPHTHERIA-TETANUS-PERTUSSIS (DTP): ICD-10-CM

## 2017-12-22 DIAGNOSIS — F02.80 LATE ONSET ALZHEIMER'S DISEASE WITHOUT BEHAVIORAL DISTURBANCE (HCC): ICD-10-CM

## 2017-12-22 DIAGNOSIS — I10 ESSENTIAL HYPERTENSION: Primary | ICD-10-CM

## 2017-12-22 PROCEDURE — 99213 OFFICE O/P EST LOW 20 MIN: CPT | Performed by: FAMILY MEDICINE

## 2017-12-22 PROCEDURE — G8399 PT W/DXA RESULTS DOCUMENT: HCPCS | Performed by: FAMILY MEDICINE

## 2017-12-22 PROCEDURE — 1036F TOBACCO NON-USER: CPT | Performed by: FAMILY MEDICINE

## 2017-12-22 PROCEDURE — G8484 FLU IMMUNIZE NO ADMIN: HCPCS | Performed by: FAMILY MEDICINE

## 2017-12-22 PROCEDURE — 1123F ACP DISCUSS/DSCN MKR DOCD: CPT | Performed by: FAMILY MEDICINE

## 2017-12-22 PROCEDURE — G8427 DOCREV CUR MEDS BY ELIG CLIN: HCPCS | Performed by: FAMILY MEDICINE

## 2017-12-22 PROCEDURE — 1090F PRES/ABSN URINE INCON ASSESS: CPT | Performed by: FAMILY MEDICINE

## 2017-12-22 PROCEDURE — G8420 CALC BMI NORM PARAMETERS: HCPCS | Performed by: FAMILY MEDICINE

## 2017-12-22 PROCEDURE — 4040F PNEUMOC VAC/ADMIN/RCVD: CPT | Performed by: FAMILY MEDICINE

## 2017-12-22 ASSESSMENT — ENCOUNTER SYMPTOMS
COUGH: 0
EYE DISCHARGE: 0
SHORTNESS OF BREATH: 0
PHOTOPHOBIA: 0
EYE REDNESS: 0
TROUBLE SWALLOWING: 0
SINUS PRESSURE: 0
NAUSEA: 0
BACK PAIN: 0
BLOOD IN STOOL: 0
DIARRHEA: 0
CONSTIPATION: 0
RECTAL PAIN: 0
COLOR CHANGE: 0
WHEEZING: 0
VOMITING: 0
RHINORRHEA: 0
STRIDOR: 0
CHEST TIGHTNESS: 0
EYE ITCHING: 0
CHOKING: 0
SORE THROAT: 0
APNEA: 0
ABDOMINAL DISTENTION: 0
EYE PAIN: 0

## 2017-12-22 NOTE — PROGRESS NOTES
Subjective:      Patient ID: Dawson Enriquez is a 80 y.o. female. The chief complaint(s)  Fu for bp & cognitive issues  HPI  She is fu visit for blood pressure  No headaches or sob or chest pains    She has some dementia  Basically lives along with close support  From daughter who is here with her today  Review of Systems   Constitutional: Negative for activity change, appetite change, chills, diaphoresis, fatigue and fever. HENT: Negative for congestion, dental problem, drooling, ear discharge, ear pain, hearing loss, mouth sores, nosebleeds, postnasal drip, rhinorrhea, sinus pressure, sneezing, sore throat, tinnitus and trouble swallowing. Eyes: Negative for photophobia, pain, discharge, redness, itching and visual disturbance. Respiratory: Negative for apnea, cough, choking, chest tightness, shortness of breath, wheezing and stridor. Cardiovascular: Negative for chest pain, palpitations and leg swelling. Gastrointestinal: Negative for abdominal distention, blood in stool, constipation, diarrhea, nausea, rectal pain and vomiting. Genitourinary: Negative for decreased urine volume, difficulty urinating, dyspareunia, dysuria, enuresis, flank pain, frequency, genital sores, hematuria, menstrual problem, pelvic pain, urgency, vaginal bleeding and vaginal pain. Musculoskeletal: Negative for arthralgias, back pain, gait problem, joint swelling, myalgias, neck pain and neck stiffness. Skin: Negative for color change, pallor, rash and wound. Neurological: Negative for dizziness, tremors, seizures, syncope, facial asymmetry, speech difficulty, weakness, light-headedness, numbness and headaches. Hematological: Negative for adenopathy. Does not bruise/bleed easily. Psychiatric/Behavioral: Negative for agitation, behavioral problems, confusion, decreased concentration, dysphoric mood, hallucinations, self-injury, sleep disturbance and suicidal ideas.  The patient is not nervous/anxious and is not the muscle once for 1 dose  Dispense: 0.5 mL; Refill: 0    4. Need for prophylactic vaccination against diphtheria-tetanus-pertussis (DTP)  - Tdap (ADACEL) 5-2-15.5 LF-MCG/0.5 injection;  Inject 0.5 mLs into the muscle once for 1 dose  Dispense: 0.5 mL; Refill: 0          Plan:         see me in 3 months

## 2017-12-22 NOTE — PATIENT INSTRUCTIONS
Patient Education        Pneumococcal Polysaccharide Vaccine: What You Need to Know  Why get vaccinated? Vaccination can protect older adults (and some children and younger adults) from pneumococcal disease. Pneumococcal disease is caused by bacteria that can spread from person to person through close contact. It can cause ear infections, and it can also lead to more serious infections of the:  · Lungs (pneumonia),  · Blood (bacteremia), and  · Covering of the brain and spinal cord (meningitis). Meningitis can cause deafness and brain damage, and it can be fatal.  Anyone can get pneumococcal disease, but children under 3years of age, people with certain medical conditions, adults over 72years of age, and cigarette smokers are at the highest risk. About 18,000 older adults die each year from pneumococcal disease in the [place]. Treatment of pneumococcal infections with penicillin and other drugs used to be more effective. But some strains of the disease have become resistant to these drugs. This makes prevention of the disease, through vaccination, even more important. Pneumococcal polysaccharide vaccine (PPSV23)  Pneumococcal polysaccharide vaccine (PPSV23) protects against 23 types of pneumococcal bacteria. It will not prevent all pneumococcal disease. PPSV23 is recommended for:  · All adults 72years of age and older,  · Anyone 2 through 59years of age with certain long-term health problems,  · Anyone 2 through 59years of age with a weakened immune system,  · Adults 23 through 59years of age who smoke cigarettes or have asthma. Most people need only one dose of PPSV. A second dose is recommended for certain high-risk groups. People 72 and older should get a dose even if they have gotten one or more doses of the vaccine before they turned 65. Your healthcare provider can give you more information about these recommendations.   Most healthy adults develop protection within 2 to 3 weeks of getting safety of vaccines is always being monitored. For more information, visit: www.cdc.gov/vaccinesafety/  What if there is a serious reaction? What should I look for? Look for anything that concerns you, such as signs of a severe allergic reaction, very high fever, or unusual behavior. Signs of a severe allergic reaction can include hives, swelling of the face and throat, difficulty breathing, a fast heartbeat, dizziness, and weakness. These would usually start a few minutes to a few hours after the vaccination. What should I do? If you think it is a severe allergic reaction or other emergency that can't wait, call 9-1-1 or get to the nearest hospital. Otherwise, call your doctor. Afterward, the reaction should be reported to the Vaccine Adverse Event Reporting System (VAERS). Your doctor might file this report, or you can do it yourself through the VAERS web site at www.vaers. [place].gov, or by calling 8-744.906.7775. [facility] does not give medical advice. How can I learn more? · Ask your doctor. He or she can give you the vaccine package insert or suggest other sources of information. · Call your local or state health department. · Contact the Centers for Disease Control and Prevention (CDC):  ¨ Call 2-672.297.6290 (1-800-CDC-INFO) or  ¨ Visit CDC's website at www.cdc.gov/vaccines  Vaccine Information Statement  PPSV Vaccine  (04/24/2015)  Department of Health and Human Services  Centers for Disease Control and Prevention  Many Vaccine Information Statements are available in [place] and other languages. See www.immunize.org/vis. Hojas de información Sobre Vacunas están disponibles en español y en muchos otros idiomas. Visite Vitaly.si. Care instructions adapted under license by [facility] ([facility]).  If you have questions about a medical condition or this instruction, always ask your healthcare professional. [address] any warranty or liability for your use of this information.

## 2018-01-21 DIAGNOSIS — I10 UNCONTROLLED HYPERTENSION: ICD-10-CM

## 2018-01-23 RX ORDER — LOSARTAN POTASSIUM 100 MG/1
TABLET ORAL
Qty: 90 TABLET | Refills: 2 | Status: SHIPPED | OUTPATIENT
Start: 2018-01-23 | End: 2018-09-21 | Stop reason: SDUPTHER

## 2018-03-16 ENCOUNTER — OFFICE VISIT (OUTPATIENT)
Dept: PRIMARY CARE CLINIC | Age: 83
End: 2018-03-16

## 2018-03-16 VITALS
HEIGHT: 66 IN | DIASTOLIC BLOOD PRESSURE: 79 MMHG | OXYGEN SATURATION: 96 % | RESPIRATION RATE: 16 BRPM | SYSTOLIC BLOOD PRESSURE: 184 MMHG | TEMPERATURE: 97.9 F | WEIGHT: 133 LBS | BODY MASS INDEX: 21.38 KG/M2

## 2018-03-16 DIAGNOSIS — I10 UNCONTROLLED HYPERTENSION: Primary | ICD-10-CM

## 2018-03-16 PROCEDURE — 1090F PRES/ABSN URINE INCON ASSESS: CPT | Performed by: FAMILY MEDICINE

## 2018-03-16 PROCEDURE — 4040F PNEUMOC VAC/ADMIN/RCVD: CPT | Performed by: FAMILY MEDICINE

## 2018-03-16 PROCEDURE — 99214 OFFICE O/P EST MOD 30 MIN: CPT | Performed by: FAMILY MEDICINE

## 2018-03-16 PROCEDURE — 1123F ACP DISCUSS/DSCN MKR DOCD: CPT | Performed by: FAMILY MEDICINE

## 2018-03-16 PROCEDURE — 1036F TOBACCO NON-USER: CPT | Performed by: FAMILY MEDICINE

## 2018-03-16 PROCEDURE — G8420 CALC BMI NORM PARAMETERS: HCPCS | Performed by: FAMILY MEDICINE

## 2018-03-16 PROCEDURE — G8482 FLU IMMUNIZE ORDER/ADMIN: HCPCS | Performed by: FAMILY MEDICINE

## 2018-03-16 PROCEDURE — G8399 PT W/DXA RESULTS DOCUMENT: HCPCS | Performed by: FAMILY MEDICINE

## 2018-03-16 PROCEDURE — G8427 DOCREV CUR MEDS BY ELIG CLIN: HCPCS | Performed by: FAMILY MEDICINE

## 2018-03-16 RX ORDER — HYDROCHLOROTHIAZIDE 25 MG/1
TABLET ORAL
Qty: 30 TABLET | Refills: 3 | Status: SHIPPED | OUTPATIENT
Start: 2018-03-16 | End: 2018-05-17 | Stop reason: SDUPTHER

## 2018-03-16 ASSESSMENT — ENCOUNTER SYMPTOMS
SORE THROAT: 0
PHOTOPHOBIA: 0
DIARRHEA: 0
CONSTIPATION: 0
WHEEZING: 0
SHORTNESS OF BREATH: 0
TROUBLE SWALLOWING: 0
COLOR CHANGE: 0
EYE PAIN: 0
BLOOD IN STOOL: 0
STRIDOR: 0
EYE DISCHARGE: 0
CHOKING: 0
RHINORRHEA: 0
BLURRED VISION: 0
APNEA: 0
EYE ITCHING: 0
BACK PAIN: 0
CHEST TIGHTNESS: 0
RECTAL PAIN: 0
COUGH: 0
SINUS PRESSURE: 0
ORTHOPNEA: 0
VOMITING: 0
EYE REDNESS: 0
NAUSEA: 0
ABDOMINAL DISTENTION: 0

## 2018-03-16 NOTE — PROGRESS NOTES
in daytime 30 tablet 2    metoprolol succinate (TOPROL XL) 50 MG extended release tablet Take one tablet twice a day and cancel script for once a day 180 tablet 3    amLODIPine (NORVASC) 10 MG tablet Take 1 tablet by mouth daily 90 tablet 1    alendronate (FOSAMAX) 70 MG tablet TAKE ONE TABLET BY MOUTH ONCE A WEEK 4 tablet 12    vitamin D (CHOLECALCIFEROL) 1000 UNIT TABS tablet Take 1,000 Units by mouth daily      aspirin 81 MG tablet Take 81 mg by mouth daily      Multiple Vitamin (MULTIVITAMINS PO) Take by mouth       No current facility-administered medications for this visit. Review of Systems   Constitutional: Negative for activity change, appetite change, chills, diaphoresis, fatigue, fever and malaise/fatigue. HENT: Negative for congestion, dental problem, drooling, ear discharge, ear pain, hearing loss, mouth sores, nosebleeds, postnasal drip, rhinorrhea, sinus pressure, sneezing, sore throat, tinnitus and trouble swallowing. Eyes: Negative for blurred vision, photophobia, pain, discharge, redness, itching and visual disturbance. Respiratory: Negative for apnea, cough, choking, chest tightness, shortness of breath, wheezing and stridor. Cardiovascular: Negative for chest pain, palpitations, orthopnea, leg swelling and PND. Gastrointestinal: Negative for abdominal distention, blood in stool, constipation, diarrhea, nausea, rectal pain and vomiting. Genitourinary: Negative for decreased urine volume, difficulty urinating, dyspareunia, dysuria, enuresis, flank pain, frequency, genital sores, hematuria, menstrual problem, pelvic pain, urgency, vaginal bleeding and vaginal pain. Musculoskeletal: Negative for arthralgias, back pain, gait problem, joint swelling, myalgias, neck pain and neck stiffness. Skin: Negative for color change, pallor, rash and wound.    Neurological: Negative for dizziness, tremors, seizures, syncope, facial asymmetry, speech difficulty, weakness, vitals reviewed. Assessment:        1. Uncontrolled hypertension  BP pressure remains uncontrolled    Will increase dose of hydrochlorothiazide to  25 mg a day  Will check renal function  Discussed low salt diet  - Basic Metabolic Panel;  Future              Plan:         see me in 2 months

## 2018-05-18 RX ORDER — HYDROCHLOROTHIAZIDE 25 MG/1
TABLET ORAL
Qty: 90 TABLET | Refills: 1 | Status: SHIPPED | OUTPATIENT
Start: 2018-05-18 | End: 2018-07-18 | Stop reason: SDUPTHER

## 2018-06-22 ENCOUNTER — OFFICE VISIT (OUTPATIENT)
Dept: PRIMARY CARE CLINIC | Age: 83
End: 2018-06-22

## 2018-06-22 VITALS
BODY MASS INDEX: 21.86 KG/M2 | HEIGHT: 66 IN | SYSTOLIC BLOOD PRESSURE: 163 MMHG | OXYGEN SATURATION: 99 % | WEIGHT: 136 LBS | DIASTOLIC BLOOD PRESSURE: 87 MMHG | TEMPERATURE: 96.6 F | HEART RATE: 80 BPM

## 2018-06-22 DIAGNOSIS — Z23 NEED FOR PROPHYLACTIC VACCINATION AND INOCULATION AGAINST VARICELLA: ICD-10-CM

## 2018-06-22 DIAGNOSIS — I10 UNCONTROLLED HYPERTENSION: ICD-10-CM

## 2018-06-22 DIAGNOSIS — I10 ESSENTIAL HYPERTENSION: ICD-10-CM

## 2018-06-22 DIAGNOSIS — Z23 NEED FOR PROPHYLACTIC VACCINATION AGAINST STREPTOCOCCUS PNEUMONIAE (PNEUMOCOCCUS): ICD-10-CM

## 2018-06-22 DIAGNOSIS — Z23 NEED FOR PROPHYLACTIC VACCINATION AGAINST DIPHTHERIA-TETANUS-PERTUSSIS (DTP): ICD-10-CM

## 2018-06-22 DIAGNOSIS — G30.1 LATE ONSET ALZHEIMER'S DISEASE WITHOUT BEHAVIORAL DISTURBANCE (HCC): Primary | ICD-10-CM

## 2018-06-22 DIAGNOSIS — F02.80 LATE ONSET ALZHEIMER'S DISEASE WITHOUT BEHAVIORAL DISTURBANCE (HCC): Primary | ICD-10-CM

## 2018-06-22 DIAGNOSIS — Z12.31 ENCOUNTER FOR MAMMOGRAM TO ESTABLISH BASELINE MAMMOGRAM: ICD-10-CM

## 2018-06-22 LAB
A/G RATIO: 1.5 (ref 1.1–2.2)
ALBUMIN SERPL-MCNC: 4.4 G/DL (ref 3.4–5)
ALP BLD-CCNC: 71 U/L (ref 40–129)
ALT SERPL-CCNC: 16 U/L (ref 10–40)
ANION GAP SERPL CALCULATED.3IONS-SCNC: 14 MMOL/L (ref 3–16)
AST SERPL-CCNC: 27 U/L (ref 15–37)
BASOPHILS ABSOLUTE: 0 K/UL (ref 0–0.2)
BASOPHILS RELATIVE PERCENT: 0.2 %
BILIRUB SERPL-MCNC: <0.2 MG/DL (ref 0–1)
BUN BLDV-MCNC: 16 MG/DL (ref 7–20)
CALCIUM SERPL-MCNC: 10 MG/DL (ref 8.3–10.6)
CHLORIDE BLD-SCNC: 101 MMOL/L (ref 99–110)
CO2: 28 MMOL/L (ref 21–32)
CREAT SERPL-MCNC: 0.8 MG/DL (ref 0.6–1.2)
EOSINOPHILS ABSOLUTE: 0.1 K/UL (ref 0–0.6)
EOSINOPHILS RELATIVE PERCENT: 1.4 %
GFR AFRICAN AMERICAN: >60
GFR NON-AFRICAN AMERICAN: >60
GLOBULIN: 2.9 G/DL
GLUCOSE BLD-MCNC: 91 MG/DL (ref 70–99)
HCT VFR BLD CALC: 39 % (ref 36–48)
HEMOGLOBIN: 13 G/DL (ref 12–16)
LYMPHOCYTES ABSOLUTE: 1.7 K/UL (ref 1–5.1)
LYMPHOCYTES RELATIVE PERCENT: 25.3 %
MCH RBC QN AUTO: 28.1 PG (ref 26–34)
MCHC RBC AUTO-ENTMCNC: 33.3 G/DL (ref 31–36)
MCV RBC AUTO: 84.5 FL (ref 80–100)
MONOCYTES ABSOLUTE: 0.6 K/UL (ref 0–1.3)
MONOCYTES RELATIVE PERCENT: 8.9 %
NEUTROPHILS ABSOLUTE: 4.3 K/UL (ref 1.7–7.7)
NEUTROPHILS RELATIVE PERCENT: 64.2 %
PDW BLD-RTO: 14.4 % (ref 12.4–15.4)
PLATELET # BLD: 205 K/UL (ref 135–450)
PMV BLD AUTO: 9 FL (ref 5–10.5)
POTASSIUM SERPL-SCNC: 5 MMOL/L (ref 3.5–5.1)
RBC # BLD: 4.62 M/UL (ref 4–5.2)
SODIUM BLD-SCNC: 143 MMOL/L (ref 136–145)
TOTAL PROTEIN: 7.3 G/DL (ref 6.4–8.2)
WBC # BLD: 6.7 K/UL (ref 4–11)

## 2018-06-22 PROCEDURE — 90732 PPSV23 VACC 2 YRS+ SUBQ/IM: CPT | Performed by: FAMILY MEDICINE

## 2018-06-22 PROCEDURE — 1090F PRES/ABSN URINE INCON ASSESS: CPT | Performed by: FAMILY MEDICINE

## 2018-06-22 PROCEDURE — 1036F TOBACCO NON-USER: CPT | Performed by: FAMILY MEDICINE

## 2018-06-22 PROCEDURE — 4040F PNEUMOC VAC/ADMIN/RCVD: CPT | Performed by: FAMILY MEDICINE

## 2018-06-22 PROCEDURE — 1123F ACP DISCUSS/DSCN MKR DOCD: CPT | Performed by: FAMILY MEDICINE

## 2018-06-22 PROCEDURE — 99214 OFFICE O/P EST MOD 30 MIN: CPT | Performed by: FAMILY MEDICINE

## 2018-06-22 PROCEDURE — G8427 DOCREV CUR MEDS BY ELIG CLIN: HCPCS | Performed by: FAMILY MEDICINE

## 2018-06-22 PROCEDURE — G8399 PT W/DXA RESULTS DOCUMENT: HCPCS | Performed by: FAMILY MEDICINE

## 2018-06-22 PROCEDURE — G8420 CALC BMI NORM PARAMETERS: HCPCS | Performed by: FAMILY MEDICINE

## 2018-06-22 PROCEDURE — G0009 ADMIN PNEUMOCOCCAL VACCINE: HCPCS | Performed by: FAMILY MEDICINE

## 2018-06-22 RX ORDER — AMLODIPINE BESYLATE 10 MG/1
10 TABLET ORAL DAILY
Qty: 90 TABLET | Refills: 1 | Status: SHIPPED | OUTPATIENT
Start: 2018-06-22 | End: 2018-12-14 | Stop reason: SDUPTHER

## 2018-06-22 ASSESSMENT — ENCOUNTER SYMPTOMS
BLOOD IN STOOL: 0
EYE PAIN: 0
COLOR CHANGE: 0
EYE ITCHING: 0
DIARRHEA: 0
TROUBLE SWALLOWING: 0
RECTAL PAIN: 0
ABDOMINAL DISTENTION: 0
APNEA: 0
BACK PAIN: 0
VOMITING: 0
SINUS PRESSURE: 0
NAUSEA: 0
ORTHOPNEA: 0
SHORTNESS OF BREATH: 0
CHEST TIGHTNESS: 0
PHOTOPHOBIA: 0
CONSTIPATION: 0
EYE REDNESS: 0
STRIDOR: 0
BLURRED VISION: 0
EYE DISCHARGE: 0
WHEEZING: 0
SORE THROAT: 0
CHOKING: 0
COUGH: 0
RHINORRHEA: 0

## 2018-06-22 ASSESSMENT — PATIENT HEALTH QUESTIONNAIRE - PHQ9
1. LITTLE INTEREST OR PLEASURE IN DOING THINGS: 0
2. FEELING DOWN, DEPRESSED OR HOPELESS: 0
SUM OF ALL RESPONSES TO PHQ9 QUESTIONS 1 & 2: 0
SUM OF ALL RESPONSES TO PHQ QUESTIONS 1-9: 0

## 2018-06-30 DIAGNOSIS — M81.0 OSTEOPOROSIS: ICD-10-CM

## 2018-07-02 RX ORDER — ALENDRONATE SODIUM 70 MG/1
TABLET ORAL
Qty: 12 TABLET | Refills: 4 | Status: SHIPPED | OUTPATIENT
Start: 2018-07-02 | End: 2019-05-28 | Stop reason: SDUPTHER

## 2018-07-20 RX ORDER — HYDROCHLOROTHIAZIDE 25 MG/1
TABLET ORAL
Qty: 90 TABLET | Refills: 1 | Status: SHIPPED | OUTPATIENT
Start: 2018-07-20 | End: 2018-12-21 | Stop reason: SDUPTHER

## 2018-09-21 ENCOUNTER — OFFICE VISIT (OUTPATIENT)
Dept: PRIMARY CARE CLINIC | Age: 83
End: 2018-09-21

## 2018-09-21 VITALS
TEMPERATURE: 97.5 F | OXYGEN SATURATION: 99 % | SYSTOLIC BLOOD PRESSURE: 140 MMHG | HEIGHT: 66 IN | BODY MASS INDEX: 21.53 KG/M2 | HEART RATE: 69 BPM | WEIGHT: 134 LBS | DIASTOLIC BLOOD PRESSURE: 68 MMHG

## 2018-09-21 DIAGNOSIS — Z23 NEED FOR INFLUENZA VACCINATION: ICD-10-CM

## 2018-09-21 DIAGNOSIS — Z23 NEED FOR PROPHYLACTIC VACCINATION AGAINST DIPHTHERIA-TETANUS-PERTUSSIS (DTP): ICD-10-CM

## 2018-09-21 DIAGNOSIS — G30.1 LATE ONSET ALZHEIMER'S DISEASE WITH BEHAVIORAL DISTURBANCE (HCC): ICD-10-CM

## 2018-09-21 DIAGNOSIS — Z23 NEED FOR PROPHYLACTIC VACCINATION AND INOCULATION AGAINST VARICELLA: ICD-10-CM

## 2018-09-21 DIAGNOSIS — F02.818 LATE ONSET ALZHEIMER'S DISEASE WITH BEHAVIORAL DISTURBANCE (HCC): ICD-10-CM

## 2018-09-21 DIAGNOSIS — I10 ESSENTIAL HYPERTENSION: Primary | ICD-10-CM

## 2018-09-21 PROCEDURE — G8427 DOCREV CUR MEDS BY ELIG CLIN: HCPCS | Performed by: FAMILY MEDICINE

## 2018-09-21 PROCEDURE — 1101F PT FALLS ASSESS-DOCD LE1/YR: CPT | Performed by: FAMILY MEDICINE

## 2018-09-21 PROCEDURE — G0008 ADMIN INFLUENZA VIRUS VAC: HCPCS | Performed by: FAMILY MEDICINE

## 2018-09-21 PROCEDURE — 1090F PRES/ABSN URINE INCON ASSESS: CPT | Performed by: FAMILY MEDICINE

## 2018-09-21 PROCEDURE — 1036F TOBACCO NON-USER: CPT | Performed by: FAMILY MEDICINE

## 2018-09-21 PROCEDURE — 1123F ACP DISCUSS/DSCN MKR DOCD: CPT | Performed by: FAMILY MEDICINE

## 2018-09-21 PROCEDURE — 4040F PNEUMOC VAC/ADMIN/RCVD: CPT | Performed by: FAMILY MEDICINE

## 2018-09-21 PROCEDURE — 99213 OFFICE O/P EST LOW 20 MIN: CPT | Performed by: FAMILY MEDICINE

## 2018-09-21 PROCEDURE — G8399 PT W/DXA RESULTS DOCUMENT: HCPCS | Performed by: FAMILY MEDICINE

## 2018-09-21 PROCEDURE — G8420 CALC BMI NORM PARAMETERS: HCPCS | Performed by: FAMILY MEDICINE

## 2018-09-21 PROCEDURE — 90662 IIV NO PRSV INCREASED AG IM: CPT | Performed by: FAMILY MEDICINE

## 2018-09-21 RX ORDER — LOSARTAN POTASSIUM 100 MG/1
TABLET ORAL
Qty: 90 TABLET | Refills: 1 | Status: SHIPPED | OUTPATIENT
Start: 2018-09-21 | End: 2018-12-21 | Stop reason: SDUPTHER

## 2018-09-21 ASSESSMENT — ENCOUNTER SYMPTOMS
PHOTOPHOBIA: 0
SORE THROAT: 0
VOMITING: 0
ABDOMINAL DISTENTION: 0
EYE DISCHARGE: 0
BACK PAIN: 0
ORTHOPNEA: 0
EYE PAIN: 0
EYE ITCHING: 0
CONSTIPATION: 0
SINUS PRESSURE: 0
APNEA: 0
RHINORRHEA: 0
CHOKING: 0
NAUSEA: 0
TROUBLE SWALLOWING: 0
SHORTNESS OF BREATH: 0
BLOOD IN STOOL: 0
DIARRHEA: 0
RECTAL PAIN: 0
EYE REDNESS: 0
COUGH: 0
BLURRED VISION: 0
STRIDOR: 0
COLOR CHANGE: 0
CHEST TIGHTNESS: 0
WHEEZING: 0

## 2018-09-21 NOTE — PATIENT INSTRUCTIONS
virus vaccine is also available in a nasal spray form, which is a \"live virus\" vaccine. Influenza virus vaccine works by exposing you to a small dose of the virus, which helps your body to develop immunity to the disease. Influenza virus vaccine will not treat an active infection that has already developed in the body. Influenza virus vaccine is for use in adults and children who are at least 7 months old. Becoming infected with influenza is much more dangerous to your health than receiving this vaccine. Influenza causes thousands of deaths each year, and hundreds of thousands of hospitalizations. However, like any medicine, this vaccine can cause side effects but the risk of serious side effects is extremely low. Like any vaccine, influenza virus vaccine may not provide protection from disease in every person. This vaccine will not prevent illness caused by tom flu (\"bird flu\"). What should I discuss with my healthcare provider before receiving this vaccine? You may not be able to receive this vaccine if you are allergic to eggs, or if you have:  · a history of severe allergic reaction to a flu vaccine; or  · a history of Guillain-Lodi syndrome (within 6 weeks after receiving a flu vaccine). To make sure this vaccine is safe for you, tell your doctor if you have ever had:  · a bleeding or blood clotting disorder such as hemophilia or easy bruising;  · a neurologic disorder or disease affecting the brain (or if this was a reaction to a previous vaccine);  · seizures;  · a weak immune system caused by disease, bone marrow transplant, or by using certain medicines or receiving cancer treatments; or  · if you are allergic to latex rubber. You can still receive a vaccine if you have a minor cold. If you have a severe illness with a fever or any type of infection, wait until you get better before receiving this vaccine.   The Centers for Disease Control and Prevention recommends that pregnant women get a flu instructions. What happens if I overdose? An overdose of this vaccine is unlikely to occur. What should I avoid before or after receiving this vaccine? Follow your doctor's instructions about any restrictions on food, beverages, or activity. What are the possible side effects of influenza virus injectable vaccine? Get emergency medical help if you have signs of an allergic reaction: hives; difficulty breathing; swelling of your face, lips, tongue, or throat. You should not receive a booster vaccine if you had a life-threatening allergic reaction after the first shot. Keep track of any and all side effects you have after receiving this vaccine. If you ever need to receive influenza virus vaccine in the future, you will need to tell your doctor if the previous shot caused any side effects. Influenza virus injectable (killed virus) vaccine will not cause you to become ill with the flu virus that it contains. However, you may have flu-like symptoms at any time during flu season that may be caused by other strains of influenza virus. Call your doctor at once if you have:  · a light-headed feeling, like you might pass out;  · severe weakness or unusual feeling in your arms and legs (may occur 2 to 4 weeks after you receive the vaccine);  · high fever;  · seizure (convulsions); or  · unusual bleeding. Common side effects may include:  · low fever, chills;  · mild fussiness or crying;  · redness, bruising, pain, swelling, or a lump where the vaccine was injected;  · headache, tired feeling; or  · joint or muscle pain. This is not a complete list of side effects and others may occur. Call your doctor for medical advice about side effects. You may report vaccine side effects to the [place] [address] and Human Services at [phone]. What other drugs will affect influenza virus injectable vaccine?   If you are using any of the following medicines, you may not be able to receive the vaccine, or may need to

## 2018-09-21 NOTE — PROGRESS NOTES
06/22/2018        Social History   Substance Use Topics    Smoking status: Never Smoker    Smokeless tobacco: Never Used    Alcohol use No       Review of Systems   Constitutional: Negative for activity change, appetite change, chills, diaphoresis, fatigue, fever and malaise/fatigue. HENT: Negative for congestion, dental problem, drooling, ear discharge, ear pain, hearing loss, mouth sores, nosebleeds, postnasal drip, rhinorrhea, sinus pressure, sneezing, sore throat, tinnitus and trouble swallowing. Eyes: Negative for blurred vision, photophobia, pain, discharge, redness, itching and visual disturbance. Respiratory: Negative for apnea, cough, choking, chest tightness, shortness of breath, wheezing and stridor. Cardiovascular: Negative for chest pain, palpitations, orthopnea, leg swelling and PND. Gastrointestinal: Negative for abdominal distention, blood in stool, constipation, diarrhea, nausea, rectal pain and vomiting. Genitourinary: Negative for decreased urine volume, difficulty urinating, dyspareunia, dysuria, enuresis, flank pain, frequency, genital sores, hematuria, menstrual problem, pelvic pain, urgency, vaginal bleeding and vaginal pain. Musculoskeletal: Negative for arthralgias, back pain, gait problem, joint swelling, myalgias, neck pain and neck stiffness. Skin: Negative for color change, pallor, rash and wound. Neurological: Negative for dizziness, tremors, seizures, syncope, facial asymmetry, speech difficulty, weakness, light-headedness, numbness and headaches. Hematological: Negative for adenopathy. Does not bruise/bleed easily. Psychiatric/Behavioral: Negative for agitation, behavioral problems, confusion, decreased concentration, dysphoric mood, hallucinations, self-injury, sleep disturbance and suicidal ideas. The patient is not nervous/anxious and is not hyperactive. Objective:   Physical Exam   Constitutional: She is oriented to person, place, and time.  She disease with behavioral disturbance  Currently stable  Good family support  3. Need for prophylactic vaccination against diphtheria-tetanus-pertussis (DTP)    - Tdap (ADACEL) 5-2-15.5 LF-MCG/0.5 injection; Inject 0.5 mLs into the muscle once for 1 dose  Dispense: 0.5 mL; Refill: 0    4.  Need for influenza vaccination    - INFLUENZA, HIGH DOSE, 65 YRS +, IM, PF, PREFILL SYR, 0.5ML (FLUZONE HD)       Plan:    see me in 3 months

## 2018-12-14 DIAGNOSIS — I10 ESSENTIAL HYPERTENSION: ICD-10-CM

## 2018-12-14 RX ORDER — AMLODIPINE BESYLATE 10 MG/1
TABLET ORAL
Qty: 90 TABLET | Refills: 1 | Status: SHIPPED | OUTPATIENT
Start: 2018-12-14 | End: 2019-05-28 | Stop reason: SDUPTHER

## 2018-12-21 ENCOUNTER — OFFICE VISIT (OUTPATIENT)
Dept: PRIMARY CARE CLINIC | Age: 83
End: 2018-12-21
Payer: MEDICARE

## 2018-12-21 VITALS
DIASTOLIC BLOOD PRESSURE: 80 MMHG | WEIGHT: 135 LBS | SYSTOLIC BLOOD PRESSURE: 138 MMHG | TEMPERATURE: 98 F | OXYGEN SATURATION: 99 % | HEART RATE: 72 BPM | BODY MASS INDEX: 21.69 KG/M2 | HEIGHT: 66 IN

## 2018-12-21 DIAGNOSIS — I10 ESSENTIAL HYPERTENSION: Primary | ICD-10-CM

## 2018-12-21 PROCEDURE — 1090F PRES/ABSN URINE INCON ASSESS: CPT | Performed by: FAMILY MEDICINE

## 2018-12-21 PROCEDURE — 1036F TOBACCO NON-USER: CPT | Performed by: FAMILY MEDICINE

## 2018-12-21 PROCEDURE — 1101F PT FALLS ASSESS-DOCD LE1/YR: CPT | Performed by: FAMILY MEDICINE

## 2018-12-21 PROCEDURE — 4040F PNEUMOC VAC/ADMIN/RCVD: CPT | Performed by: FAMILY MEDICINE

## 2018-12-21 PROCEDURE — 99213 OFFICE O/P EST LOW 20 MIN: CPT | Performed by: FAMILY MEDICINE

## 2018-12-21 PROCEDURE — G8427 DOCREV CUR MEDS BY ELIG CLIN: HCPCS | Performed by: FAMILY MEDICINE

## 2018-12-21 PROCEDURE — G8399 PT W/DXA RESULTS DOCUMENT: HCPCS | Performed by: FAMILY MEDICINE

## 2018-12-21 PROCEDURE — G8482 FLU IMMUNIZE ORDER/ADMIN: HCPCS | Performed by: FAMILY MEDICINE

## 2018-12-21 PROCEDURE — 1123F ACP DISCUSS/DSCN MKR DOCD: CPT | Performed by: FAMILY MEDICINE

## 2018-12-21 PROCEDURE — G8420 CALC BMI NORM PARAMETERS: HCPCS | Performed by: FAMILY MEDICINE

## 2018-12-21 RX ORDER — METOPROLOL SUCCINATE 50 MG/1
TABLET, EXTENDED RELEASE ORAL
Qty: 180 TABLET | Refills: 3 | Status: SHIPPED | OUTPATIENT
Start: 2018-12-21 | End: 2019-11-15 | Stop reason: SDUPTHER

## 2018-12-21 RX ORDER — HYDROCHLOROTHIAZIDE 25 MG/1
TABLET ORAL
Qty: 90 TABLET | Refills: 1 | Status: SHIPPED | OUTPATIENT
Start: 2018-12-21 | End: 2019-05-28 | Stop reason: SDUPTHER

## 2018-12-21 RX ORDER — LOSARTAN POTASSIUM 100 MG/1
TABLET ORAL
Qty: 90 TABLET | Refills: 1 | Status: SHIPPED | OUTPATIENT
Start: 2018-12-21 | End: 2019-09-13 | Stop reason: SDUPTHER

## 2018-12-21 ASSESSMENT — ENCOUNTER SYMPTOMS
EYE PAIN: 0
SORE THROAT: 0
PHOTOPHOBIA: 0
RHINORRHEA: 0
CHEST TIGHTNESS: 0
VOMITING: 0
DIARRHEA: 0
APNEA: 0
BLOOD IN STOOL: 0
ABDOMINAL DISTENTION: 0
CHOKING: 0
COUGH: 0
NAUSEA: 0
TROUBLE SWALLOWING: 0
CONSTIPATION: 0
BLURRED VISION: 0
EYE REDNESS: 0
EYE DISCHARGE: 0
EYE ITCHING: 0
ORTHOPNEA: 0
SINUS PRESSURE: 0
WHEEZING: 0
COLOR CHANGE: 0
RECTAL PAIN: 0
BACK PAIN: 0
STRIDOR: 0
SHORTNESS OF BREATH: 0

## 2019-03-22 ENCOUNTER — OFFICE VISIT (OUTPATIENT)
Dept: PRIMARY CARE CLINIC | Age: 84
End: 2019-03-22
Payer: MEDICARE

## 2019-03-22 VITALS
HEIGHT: 66 IN | DIASTOLIC BLOOD PRESSURE: 60 MMHG | HEART RATE: 72 BPM | SYSTOLIC BLOOD PRESSURE: 110 MMHG | WEIGHT: 134.8 LBS | OXYGEN SATURATION: 98 % | BODY MASS INDEX: 21.66 KG/M2

## 2019-03-22 DIAGNOSIS — I10 ESSENTIAL HYPERTENSION: Primary | ICD-10-CM

## 2019-03-22 PROCEDURE — G8427 DOCREV CUR MEDS BY ELIG CLIN: HCPCS | Performed by: FAMILY MEDICINE

## 2019-03-22 PROCEDURE — 1036F TOBACCO NON-USER: CPT | Performed by: FAMILY MEDICINE

## 2019-03-22 PROCEDURE — 1090F PRES/ABSN URINE INCON ASSESS: CPT | Performed by: FAMILY MEDICINE

## 2019-03-22 PROCEDURE — G8399 PT W/DXA RESULTS DOCUMENT: HCPCS | Performed by: FAMILY MEDICINE

## 2019-03-22 PROCEDURE — 99213 OFFICE O/P EST LOW 20 MIN: CPT | Performed by: FAMILY MEDICINE

## 2019-03-22 PROCEDURE — 1101F PT FALLS ASSESS-DOCD LE1/YR: CPT | Performed by: FAMILY MEDICINE

## 2019-03-22 PROCEDURE — G8420 CALC BMI NORM PARAMETERS: HCPCS | Performed by: FAMILY MEDICINE

## 2019-03-22 PROCEDURE — 4040F PNEUMOC VAC/ADMIN/RCVD: CPT | Performed by: FAMILY MEDICINE

## 2019-03-22 PROCEDURE — 1123F ACP DISCUSS/DSCN MKR DOCD: CPT | Performed by: FAMILY MEDICINE

## 2019-03-22 PROCEDURE — G8482 FLU IMMUNIZE ORDER/ADMIN: HCPCS | Performed by: FAMILY MEDICINE

## 2019-03-22 ASSESSMENT — ENCOUNTER SYMPTOMS
EYE ITCHING: 0
NAUSEA: 0
ABDOMINAL DISTENTION: 0
RECTAL PAIN: 0
CHOKING: 0
STRIDOR: 0
BLURRED VISION: 0
CONSTIPATION: 0
PHOTOPHOBIA: 0
WHEEZING: 0
SORE THROAT: 0
DIARRHEA: 0
TROUBLE SWALLOWING: 0
BLOOD IN STOOL: 0
EYE REDNESS: 0
RHINORRHEA: 0
APNEA: 0
CHEST TIGHTNESS: 0
EYE DISCHARGE: 0
SHORTNESS OF BREATH: 0
EYE PAIN: 0
SINUS PRESSURE: 0
ORTHOPNEA: 0
COLOR CHANGE: 0
VOMITING: 0
COUGH: 0
BACK PAIN: 0

## 2019-03-22 ASSESSMENT — PATIENT HEALTH QUESTIONNAIRE - PHQ9
SUM OF ALL RESPONSES TO PHQ QUESTIONS 1-9: 0
SUM OF ALL RESPONSES TO PHQ9 QUESTIONS 1 & 2: 0
SUM OF ALL RESPONSES TO PHQ QUESTIONS 1-9: 0
1. LITTLE INTEREST OR PLEASURE IN DOING THINGS: 0
2. FEELING DOWN, DEPRESSED OR HOPELESS: 0

## 2019-03-28 DIAGNOSIS — I10 ESSENTIAL HYPERTENSION: ICD-10-CM

## 2019-03-28 LAB
ANION GAP SERPL CALCULATED.3IONS-SCNC: 15 MMOL/L (ref 3–16)
BUN BLDV-MCNC: 17 MG/DL (ref 7–20)
CALCIUM SERPL-MCNC: 9 MG/DL (ref 8.3–10.6)
CHLORIDE BLD-SCNC: 99 MMOL/L (ref 99–110)
CO2: 26 MMOL/L (ref 21–32)
CREAT SERPL-MCNC: 0.9 MG/DL (ref 0.6–1.2)
GFR AFRICAN AMERICAN: >60
GFR NON-AFRICAN AMERICAN: 59
GLUCOSE BLD-MCNC: 82 MG/DL (ref 70–99)
POTASSIUM SERPL-SCNC: 3.8 MMOL/L (ref 3.5–5.1)
SODIUM BLD-SCNC: 140 MMOL/L (ref 136–145)

## 2019-05-28 ENCOUNTER — OFFICE VISIT (OUTPATIENT)
Dept: PRIMARY CARE CLINIC | Age: 84
End: 2019-05-28
Payer: MEDICARE

## 2019-05-28 VITALS
BODY MASS INDEX: 22.08 KG/M2 | OXYGEN SATURATION: 99 % | SYSTOLIC BLOOD PRESSURE: 138 MMHG | WEIGHT: 137.4 LBS | HEART RATE: 66 BPM | DIASTOLIC BLOOD PRESSURE: 74 MMHG | HEIGHT: 66 IN

## 2019-05-28 DIAGNOSIS — I10 ESSENTIAL HYPERTENSION: ICD-10-CM

## 2019-05-28 DIAGNOSIS — M81.0 POSTMENOPAUSAL OSTEOPOROSIS: ICD-10-CM

## 2019-05-28 DIAGNOSIS — R21 SKIN RASH: Primary | ICD-10-CM

## 2019-05-28 PROCEDURE — 1036F TOBACCO NON-USER: CPT | Performed by: FAMILY MEDICINE

## 2019-05-28 PROCEDURE — 99214 OFFICE O/P EST MOD 30 MIN: CPT | Performed by: FAMILY MEDICINE

## 2019-05-28 PROCEDURE — 1123F ACP DISCUSS/DSCN MKR DOCD: CPT | Performed by: FAMILY MEDICINE

## 2019-05-28 PROCEDURE — G8420 CALC BMI NORM PARAMETERS: HCPCS | Performed by: FAMILY MEDICINE

## 2019-05-28 PROCEDURE — 4040F PNEUMOC VAC/ADMIN/RCVD: CPT | Performed by: FAMILY MEDICINE

## 2019-05-28 PROCEDURE — G8427 DOCREV CUR MEDS BY ELIG CLIN: HCPCS | Performed by: FAMILY MEDICINE

## 2019-05-28 PROCEDURE — 1090F PRES/ABSN URINE INCON ASSESS: CPT | Performed by: FAMILY MEDICINE

## 2019-05-28 RX ORDER — CLOTRIMAZOLE AND BETAMETHASONE DIPROPIONATE 10; .64 MG/G; MG/G
CREAM TOPICAL
Qty: 15 G | Refills: 1 | Status: SHIPPED | OUTPATIENT
Start: 2019-05-28 | End: 2021-09-29

## 2019-05-28 RX ORDER — AMLODIPINE BESYLATE 10 MG/1
TABLET ORAL
Qty: 90 TABLET | Refills: 1 | Status: SHIPPED | OUTPATIENT
Start: 2019-05-28 | End: 2019-12-13 | Stop reason: SDUPTHER

## 2019-05-28 RX ORDER — HYDROCHLOROTHIAZIDE 25 MG/1
TABLET ORAL
Qty: 90 TABLET | Refills: 1 | Status: SHIPPED | OUTPATIENT
Start: 2019-05-28 | End: 2019-06-10 | Stop reason: SDUPTHER

## 2019-05-28 RX ORDER — TRIAMCINOLONE ACETONIDE 1 MG/G
CREAM TOPICAL
Qty: 28.4 G | Refills: 1 | Status: SHIPPED | OUTPATIENT
Start: 2019-05-28 | End: 2019-05-28 | Stop reason: CLARIF

## 2019-05-28 RX ORDER — ALENDRONATE SODIUM 70 MG/1
TABLET ORAL
Qty: 12 TABLET | Refills: 4 | Status: ON HOLD | OUTPATIENT
Start: 2019-05-28 | End: 2020-04-02

## 2019-05-28 ASSESSMENT — ENCOUNTER SYMPTOMS
WHEEZING: 0
RECTAL PAIN: 0
SINUS PRESSURE: 0
EYE PAIN: 0
APNEA: 0
EYE REDNESS: 0
RHINORRHEA: 0
CHOKING: 0
EYE DISCHARGE: 0
CONSTIPATION: 0
BACK PAIN: 0
ABDOMINAL DISTENTION: 0
VOMITING: 0
CHEST TIGHTNESS: 0
PHOTOPHOBIA: 0
DIARRHEA: 0
SHORTNESS OF BREATH: 0
STRIDOR: 0
BLOOD IN STOOL: 0
COUGH: 0
TROUBLE SWALLOWING: 0
SORE THROAT: 0
EYE ITCHING: 0
COLOR CHANGE: 0
NAUSEA: 0

## 2019-05-28 NOTE — PROGRESS NOTES
Social Needs    Financial resource strain: Not on file    Food insecurity:     Worry: Not on file     Inability: Not on file    Transportation needs:     Medical: Not on file     Non-medical: Not on file   Tobacco Use    Smoking status: Never Smoker    Smokeless tobacco: Never Used   Substance and Sexual Activity    Alcohol use: No    Drug use: No    Sexual activity: Never   Lifestyle    Physical activity:     Days per week: Not on file     Minutes per session: Not on file    Stress: Not on file   Relationships    Social connections:     Talks on phone: Not on file     Gets together: Not on file     Attends [employer] service: Not on file     Active member of club or organization: Not on file     Attends meetings of clubs or organizations: Not on file     Relationship status: Not on file    Intimate partner violence:     Fear of current or ex partner: Not on file     Emotionally abused: Not on file     Physically abused: Not on file     Forced sexual activity: Not on file   Other Topics Concern    Not on file   Social History Narrative    Not on file     Past Surgical History:   Procedure Laterality Date    TOE SURGERY           Review of Systems   Constitutional: Negative for activity change, appetite change, chills, diaphoresis, fatigue and fever. HENT: Negative for congestion, dental problem, drooling, ear discharge, ear pain, hearing loss, mouth sores, nosebleeds, postnasal drip, rhinorrhea, sinus pressure, sneezing, sore throat, tinnitus and trouble swallowing. Eyes: Negative for photophobia, pain, discharge, redness, itching and visual disturbance. Respiratory: Negative for apnea, cough, choking, chest tightness, shortness of breath, wheezing and stridor. Cardiovascular: Negative for chest pain, palpitations and leg swelling. Gastrointestinal: Negative for abdominal distention, blood in stool, constipation, diarrhea, nausea, rectal pain and vomiting.    Genitourinary: Negative for decreased urine volume, difficulty urinating, dyspareunia, dysuria, enuresis, flank pain, frequency, genital sores, hematuria, menstrual problem, pelvic pain, urgency, vaginal bleeding and vaginal pain. Musculoskeletal: Negative for arthralgias, back pain, gait problem, joint swelling, myalgias, neck pain and neck stiffness. Skin: Negative for color change, pallor, rash and wound. Neurological: Negative for dizziness, tremors, seizures, syncope, facial asymmetry, speech difficulty, weakness, light-headedness, numbness and headaches. Hematological: Negative for adenopathy. Does not bruise/bleed easily. Psychiatric/Behavioral: Negative for agitation, behavioral problems, confusion, decreased concentration, dysphoric mood, hallucinations, self-injury, sleep disturbance and suicidal ideas. The patient is not nervous/anxious and is not hyperactive. Objective:   Physical Exam   Constitutional: She is oriented to person, place, and time. She appears well-developed and well-nourished. No distress. HENT:   Head: Normocephalic and atraumatic. Right Ear: External ear normal.   Left Ear: External ear normal.   Nose: Nose normal.   Mouth/Throat: Oropharynx is clear and moist. No oropharyngeal exudate. Eyes: Pupils are equal, round, and reactive to light. Conjunctivae and EOM are normal. Left eye exhibits no discharge. No scleral icterus. Neck: Normal range of motion. Neck supple. No JVD present. No tracheal deviation present. No thyromegaly present. Cardiovascular: Normal rate, regular rhythm, normal heart sounds and intact distal pulses. Exam reveals no gallop and no friction rub. No murmur heard. Pulmonary/Chest: Effort normal and breath sounds normal. No stridor. No respiratory distress. She has no wheezes. She has no rales. She exhibits no tenderness. Abdominal: Soft. Bowel sounds are normal. She exhibits no distension and no mass. There is no tenderness. There is no rebound and no guarding. Musculoskeletal: Normal range of motion. She exhibits no edema or tenderness. Lymphadenopathy:     She has no cervical adenopathy. Neurological: She is alert and oriented to person, place, and time. She has normal reflexes. She displays normal reflexes. No cranial nerve deficit. Coordination normal.   Skin: Skin is warm and dry. No rash noted. She is not diaphoretic. No erythema. No pallor. Scaly localized skin rash of the RLQ   Psychiatric: She has a normal mood and affect. Her behavior is normal. Judgment and thought content normal.   Nursing note and vitals reviewed. Assessment:      1. Skin rash  Suspect non specific derm vs fungal vs other    Trial the following and west in 2 weeks, consider derm ref if not cleared  - clotrimazole-betamethasone (LOTRISONE) 1-0.05 % cream; Apply topically 2 times daily and cancel script for triamcinolone 0.1 % cream  Dispense: 15 g; Refill: 1    2. Essential hypertension  bp at goal  Recent renal  - amLODIPine (NORVASC) 10 MG tablet; TAKE 1 TABLET BY MOUTH ONCE DAILY  Dispense: 90 tablet; Refill: 1  - hydrochlorothiazide (HYDRODIURIL) 25 MG tablet; Take one tablet a day and stop hctz 12.5 mg a day  Dispense: 90 tablet; Refill: 1    3.  Postmenopausal osteoporosis    Will refill fosanax        Plan:         see me om 3 leonor Caroday, MD

## 2019-06-10 DIAGNOSIS — I10 ESSENTIAL HYPERTENSION: ICD-10-CM

## 2019-06-10 RX ORDER — HYDROCHLOROTHIAZIDE 25 MG/1
TABLET ORAL
Qty: 90 TABLET | Refills: 1 | Status: SHIPPED | OUTPATIENT
Start: 2019-06-10 | End: 2019-09-13 | Stop reason: SDUPTHER

## 2019-06-18 ENCOUNTER — OFFICE VISIT (OUTPATIENT)
Dept: PRIMARY CARE CLINIC | Age: 84
End: 2019-06-18
Payer: MEDICARE

## 2019-06-18 VITALS
SYSTOLIC BLOOD PRESSURE: 131 MMHG | TEMPERATURE: 97.9 F | OXYGEN SATURATION: 98 % | HEIGHT: 66 IN | BODY MASS INDEX: 21.86 KG/M2 | DIASTOLIC BLOOD PRESSURE: 67 MMHG | HEART RATE: 59 BPM | WEIGHT: 136 LBS

## 2019-06-18 DIAGNOSIS — I10 ESSENTIAL HYPERTENSION: ICD-10-CM

## 2019-06-18 DIAGNOSIS — R21 SKIN RASH: Primary | ICD-10-CM

## 2019-06-18 PROCEDURE — 99213 OFFICE O/P EST LOW 20 MIN: CPT | Performed by: FAMILY MEDICINE

## 2019-06-18 PROCEDURE — 1123F ACP DISCUSS/DSCN MKR DOCD: CPT | Performed by: FAMILY MEDICINE

## 2019-06-18 PROCEDURE — 1090F PRES/ABSN URINE INCON ASSESS: CPT | Performed by: FAMILY MEDICINE

## 2019-06-18 PROCEDURE — 4040F PNEUMOC VAC/ADMIN/RCVD: CPT | Performed by: FAMILY MEDICINE

## 2019-06-18 PROCEDURE — G8420 CALC BMI NORM PARAMETERS: HCPCS | Performed by: FAMILY MEDICINE

## 2019-06-18 PROCEDURE — 1036F TOBACCO NON-USER: CPT | Performed by: FAMILY MEDICINE

## 2019-06-18 PROCEDURE — G8427 DOCREV CUR MEDS BY ELIG CLIN: HCPCS | Performed by: FAMILY MEDICINE

## 2019-06-18 ASSESSMENT — ENCOUNTER SYMPTOMS
CHOKING: 0
COUGH: 0
PHOTOPHOBIA: 0
EYE DISCHARGE: 0
RHINORRHEA: 0
EYE ITCHING: 0
EYE PAIN: 0
ABDOMINAL DISTENTION: 0
SINUS PRESSURE: 0
RECTAL PAIN: 0
TROUBLE SWALLOWING: 0
DIARRHEA: 0
CHEST TIGHTNESS: 0
APNEA: 0
EYE REDNESS: 0
SHORTNESS OF BREATH: 0
CONSTIPATION: 0
WHEEZING: 0
SORE THROAT: 0
STRIDOR: 0
BLOOD IN STOOL: 0
COLOR CHANGE: 0
VOMITING: 0
NAUSEA: 0
BACK PAIN: 0

## 2019-06-18 NOTE — PROGRESS NOTES
Subjective:      Patient ID: Amina Yan is a 80 y.o. female. Fu skin lower abdomen and fu bp  HPIThe rash is much improved  No ha or sob or chest pain    Review of Systems   Constitutional: Negative for activity change, appetite change, chills, diaphoresis, fatigue and fever. HENT: Negative for congestion, dental problem, drooling, ear discharge, ear pain, hearing loss, mouth sores, nosebleeds, postnasal drip, rhinorrhea, sinus pressure, sneezing, sore throat, tinnitus and trouble swallowing. Eyes: Negative for photophobia, pain, discharge, redness, itching and visual disturbance. Respiratory: Negative for apnea, cough, choking, chest tightness, shortness of breath, wheezing and stridor. Cardiovascular: Negative for chest pain, palpitations and leg swelling. Gastrointestinal: Negative for abdominal distention, blood in stool, constipation, diarrhea, nausea, rectal pain and vomiting. Genitourinary: Negative for decreased urine volume, difficulty urinating, dyspareunia, dysuria, enuresis, flank pain, frequency, genital sores, hematuria, menstrual problem, pelvic pain, urgency, vaginal bleeding and vaginal pain. Musculoskeletal: Negative for arthralgias, back pain, gait problem, joint swelling, myalgias, neck pain and neck stiffness. Skin: Negative for color change, pallor, rash and wound. Neurological: Negative for dizziness, tremors, seizures, syncope, facial asymmetry, speech difficulty, weakness, light-headedness, numbness and headaches. Hematological: Negative for adenopathy. Does not bruise/bleed easily. Psychiatric/Behavioral: Negative for agitation, behavioral problems, confusion, decreased concentration, dysphoric mood, hallucinations, self-injury, sleep disturbance and suicidal ideas. The patient is not nervous/anxious and is not hyperactive. Objective:   Physical Exam   Constitutional: She is oriented to person, place, and time. She appears well-developed and well-nourished.

## 2019-09-13 ENCOUNTER — OFFICE VISIT (OUTPATIENT)
Dept: PRIMARY CARE CLINIC | Age: 84
End: 2019-09-13
Payer: MEDICARE

## 2019-09-13 VITALS
BODY MASS INDEX: 21.76 KG/M2 | DIASTOLIC BLOOD PRESSURE: 60 MMHG | WEIGHT: 135.4 LBS | OXYGEN SATURATION: 99 % | HEART RATE: 68 BPM | SYSTOLIC BLOOD PRESSURE: 122 MMHG | HEIGHT: 66 IN

## 2019-09-13 DIAGNOSIS — I10 ESSENTIAL HYPERTENSION: ICD-10-CM

## 2019-09-13 DIAGNOSIS — Z23 NEEDS FLU SHOT: ICD-10-CM

## 2019-09-13 DIAGNOSIS — E78.2 MIXED HYPERLIPIDEMIA: ICD-10-CM

## 2019-09-13 DIAGNOSIS — G89.29 CHRONIC RIGHT SHOULDER PAIN: Primary | ICD-10-CM

## 2019-09-13 DIAGNOSIS — M25.511 CHRONIC RIGHT SHOULDER PAIN: Primary | ICD-10-CM

## 2019-09-13 PROCEDURE — G8420 CALC BMI NORM PARAMETERS: HCPCS | Performed by: FAMILY MEDICINE

## 2019-09-13 PROCEDURE — 90662 IIV NO PRSV INCREASED AG IM: CPT | Performed by: FAMILY MEDICINE

## 2019-09-13 PROCEDURE — G8427 DOCREV CUR MEDS BY ELIG CLIN: HCPCS | Performed by: FAMILY MEDICINE

## 2019-09-13 PROCEDURE — G0008 ADMIN INFLUENZA VIRUS VAC: HCPCS | Performed by: FAMILY MEDICINE

## 2019-09-13 PROCEDURE — 1090F PRES/ABSN URINE INCON ASSESS: CPT | Performed by: FAMILY MEDICINE

## 2019-09-13 PROCEDURE — 99214 OFFICE O/P EST MOD 30 MIN: CPT | Performed by: FAMILY MEDICINE

## 2019-09-13 PROCEDURE — 1123F ACP DISCUSS/DSCN MKR DOCD: CPT | Performed by: FAMILY MEDICINE

## 2019-09-13 PROCEDURE — 1036F TOBACCO NON-USER: CPT | Performed by: FAMILY MEDICINE

## 2019-09-13 PROCEDURE — 4040F PNEUMOC VAC/ADMIN/RCVD: CPT | Performed by: FAMILY MEDICINE

## 2019-09-13 RX ORDER — HYDROCHLOROTHIAZIDE 25 MG/1
TABLET ORAL
Qty: 90 TABLET | Refills: 1 | Status: SHIPPED | OUTPATIENT
Start: 2019-09-13 | End: 2020-03-06 | Stop reason: SDUPTHER

## 2019-09-13 RX ORDER — LOSARTAN POTASSIUM 100 MG/1
TABLET ORAL
Qty: 90 TABLET | Refills: 1 | Status: SHIPPED | OUTPATIENT
Start: 2019-09-13 | End: 2020-04-09 | Stop reason: SDUPTHER

## 2019-09-13 ASSESSMENT — ENCOUNTER SYMPTOMS
ORTHOPNEA: 0
SHORTNESS OF BREATH: 0
BLURRED VISION: 0

## 2019-09-13 NOTE — PROGRESS NOTES
Subjective:      Patient ID: Herrera Schneider is a 80 y.o. female. Hypertension   This is a chronic problem. The current episode started more than 1 year ago. The problem is controlled. Pertinent negatives include no anxiety, blurred vision, chest pain, headaches, malaise/fatigue, neck pain, orthopnea, palpitations, peripheral edema, PND, shortness of breath or sweats. Past treatments include diuretics, beta blockers, calcium channel blockers and angiotensin blockers. The current treatment provides significant improvement. There are no compliance problems. There is no history of kidney disease, CAD/MI, CVA, heart failure, left ventricular hypertrophy, PVD or retinopathy. There is no history of chronic renal disease, coarctation of the aorta, hyperaldosteronism, hypercortisolism, hyperparathyroidism, pheochromocytoma, renovascular disease, sleep apnea or a thyroid problem. Hyperlipidemia   This is a chronic problem. The current episode started more than 1 year ago. The problem is controlled. Recent lipid tests were reviewed and are normal. Exacerbating diseases include diabetes. She has no history of chronic renal disease, hypothyroidism, liver disease, obesity or nephrotic syndrome. Factors aggravating her hyperlipidemia include beta blockers and thiazides. Pertinent negatives include no chest pain, focal weakness, leg pain, myalgias or shortness of breath. Current antihyperlipidemic treatment includes statins. There are no compliance problems. Risk factors for coronary artery disease include hypertension and dyslipidemia. Shoulder Pain    Chronicity:  R shoulder pain fu. getting better at this time. The current episode started more than 1 year ago. The problem occurs rarely. She has tried acetaminophen for the symptoms. The treatment provided significant relief. Family history does not include gout. Her past medical history is significant for diabetes and osteoarthritis.  There is no history of gout or

## 2019-11-15 DIAGNOSIS — I10 ESSENTIAL HYPERTENSION: ICD-10-CM

## 2019-11-15 RX ORDER — METOPROLOL SUCCINATE 50 MG/1
TABLET, EXTENDED RELEASE ORAL
Qty: 180 TABLET | Refills: 3 | Status: SHIPPED | OUTPATIENT
Start: 2019-11-15 | End: 2020-11-12 | Stop reason: SDUPTHER

## 2019-12-13 LAB
ANION GAP SERPL CALCULATED.3IONS-SCNC: 17 MMOL/L (ref 3–16)
BUN BLDV-MCNC: 16 MG/DL (ref 7–20)
CALCIUM SERPL-MCNC: 9.4 MG/DL (ref 8.3–10.6)
CHLORIDE BLD-SCNC: 95 MMOL/L (ref 99–110)
CO2: 24 MMOL/L (ref 21–32)
CREAT SERPL-MCNC: 0.9 MG/DL (ref 0.6–1.2)
GFR AFRICAN AMERICAN: >60
GFR NON-AFRICAN AMERICAN: 59
GLUCOSE BLD-MCNC: 93 MG/DL (ref 70–99)
POTASSIUM SERPL-SCNC: 3.6 MMOL/L (ref 3.5–5.1)
SODIUM BLD-SCNC: 136 MMOL/L (ref 136–145)

## 2020-01-17 ENCOUNTER — OFFICE VISIT (OUTPATIENT)
Dept: PRIMARY CARE CLINIC | Age: 85
End: 2020-01-17
Payer: MEDICARE

## 2020-01-17 VITALS
WEIGHT: 136.6 LBS | HEIGHT: 66 IN | SYSTOLIC BLOOD PRESSURE: 137 MMHG | OXYGEN SATURATION: 99 % | HEART RATE: 67 BPM | DIASTOLIC BLOOD PRESSURE: 74 MMHG | BODY MASS INDEX: 21.95 KG/M2

## 2020-01-17 PROCEDURE — G8482 FLU IMMUNIZE ORDER/ADMIN: HCPCS | Performed by: FAMILY MEDICINE

## 2020-01-17 PROCEDURE — 1090F PRES/ABSN URINE INCON ASSESS: CPT | Performed by: FAMILY MEDICINE

## 2020-01-17 PROCEDURE — 99214 OFFICE O/P EST MOD 30 MIN: CPT | Performed by: FAMILY MEDICINE

## 2020-01-17 PROCEDURE — G8420 CALC BMI NORM PARAMETERS: HCPCS | Performed by: FAMILY MEDICINE

## 2020-01-17 PROCEDURE — 4040F PNEUMOC VAC/ADMIN/RCVD: CPT | Performed by: FAMILY MEDICINE

## 2020-01-17 PROCEDURE — 1036F TOBACCO NON-USER: CPT | Performed by: FAMILY MEDICINE

## 2020-01-17 PROCEDURE — G8427 DOCREV CUR MEDS BY ELIG CLIN: HCPCS | Performed by: FAMILY MEDICINE

## 2020-01-17 PROCEDURE — 1123F ACP DISCUSS/DSCN MKR DOCD: CPT | Performed by: FAMILY MEDICINE

## 2020-01-17 ASSESSMENT — ENCOUNTER SYMPTOMS
ABDOMINAL DISTENTION: 0
COUGH: 0
BACK PAIN: 0
WHEEZING: 0
EYE ITCHING: 0
NAUSEA: 0
EYE DISCHARGE: 0
RHINORRHEA: 0
CONSTIPATION: 0
DIARRHEA: 0
RECTAL PAIN: 0
SORE THROAT: 0
STRIDOR: 0
CHOKING: 0
CHEST TIGHTNESS: 0
EYE PAIN: 0
COLOR CHANGE: 0
TROUBLE SWALLOWING: 0
SHORTNESS OF BREATH: 0
BLOOD IN STOOL: 0
PHOTOPHOBIA: 0
VOMITING: 0
BLURRED VISION: 0
ORTHOPNEA: 0
EYE REDNESS: 0
APNEA: 0
SINUS PRESSURE: 0

## 2020-01-17 ASSESSMENT — PATIENT HEALTH QUESTIONNAIRE - PHQ9
SUM OF ALL RESPONSES TO PHQ QUESTIONS 1-9: 0
SUM OF ALL RESPONSES TO PHQ9 QUESTIONS 1 & 2: 0
2. FEELING DOWN, DEPRESSED OR HOPELESS: 0
1. LITTLE INTEREST OR PLEASURE IN DOING THINGS: 0
SUM OF ALL RESPONSES TO PHQ QUESTIONS 1-9: 0

## 2020-01-17 NOTE — PROGRESS NOTES
Subjective:      Patient ID: Sona Baer is a 80 y.o. female. Hypertension   The current episode started more than 1 year ago. The problem is controlled. Pertinent negatives include no anxiety, blurred vision, chest pain, headaches, malaise/fatigue, neck pain, orthopnea, palpitations, peripheral edema, PND or shortness of breath. Risk factors for coronary artery disease include post-menopausal state. Past treatments include beta blockers and calcium channel blockers. The current treatment provides significant improvement. There are no compliance problems. There is no history of angina, kidney disease, CAD/MI, CVA, heart failure, left ventricular hypertrophy, PVD or retinopathy. There is no history of chronic renal disease, coarctation of the aorta, hyperaldosteronism, hypercortisolism, hyperparathyroidism, a hypertension causing med, pheochromocytoma, renovascular disease, sleep apnea or a thyroid problem. Toe Pain    Incident onset: Left little pain present for about 2 months duration, present day and night, seems to be getting  worse. Tight shoes makes it worse. lose shoes helps some. not  getting better at this time. There was no injury mechanism. The pain is at a severity of 5/10. The pain is moderate. The pain has been worsening since onset. Pertinent negatives include no inability to bear weight, loss of motion, loss of sensation, muscle weakness, numbness or tingling. She reports no foreign bodies present. Exacerbated by: tight shoes. She has tried nothing for the symptoms. The treatment provided no relief. Review of Systems   Constitutional: Negative for activity change, appetite change, chills, diaphoresis, fatigue, fever and malaise/fatigue. HENT: Negative for congestion, dental problem, drooling, ear discharge, ear pain, hearing loss, mouth sores, nosebleeds, postnasal drip, rhinorrhea, sinus pressure, sneezing, sore throat, tinnitus and trouble swallowing.     Eyes: Negative for blurred vision, photophobia, pain, discharge, redness, itching and visual disturbance. Respiratory: Negative for apnea, cough, choking, chest tightness, shortness of breath, wheezing and stridor. Cardiovascular: Negative for chest pain, palpitations, orthopnea, leg swelling and PND. Gastrointestinal: Negative for abdominal distention, blood in stool, constipation, diarrhea, nausea, rectal pain and vomiting. Genitourinary: Negative for decreased urine volume, difficulty urinating, dyspareunia, dysuria, enuresis, flank pain, frequency, genital sores, hematuria, menstrual problem, pelvic pain, urgency, vaginal bleeding and vaginal pain. Musculoskeletal: Negative for arthralgias, back pain, gait problem, joint swelling, myalgias, neck pain and neck stiffness. Left little toe pain     Skin: Negative for color change, pallor, rash and wound. Neurological: Negative for dizziness, tingling, tremors, seizures, syncope, facial asymmetry, speech difficulty, weakness, light-headedness, numbness and headaches. Hematological: Negative for adenopathy. Does not bruise/bleed easily. Psychiatric/Behavioral: Negative for agitation, behavioral problems, confusion, decreased concentration, dysphoric mood, hallucinations, self-injury, sleep disturbance and suicidal ideas. The patient is not nervous/anxious and is not hyperactive. Objective:   Physical Exam  Constitutional:       General: She is not in acute distress. Appearance: She is well-developed. She is not diaphoretic. HENT:      Head: Normocephalic and atraumatic. Right Ear: External ear normal.      Left Ear: External ear normal.      Nose: Nose normal.      Mouth/Throat:      Pharynx: No oropharyngeal exudate. Eyes:      General: No scleral icterus. Left eye: No discharge. Conjunctiva/sclera: Conjunctivae normal.      Pupils: Pupils are equal, round, and reactive to light.    Neck:      Musculoskeletal: Normal range of motion and neck supple. Thyroid: No thyromegaly. Vascular: No JVD. Trachea: No tracheal deviation. Cardiovascular:      Rate and Rhythm: Normal rate and regular rhythm. Heart sounds: Normal heart sounds. No murmur. No friction rub. No gallop. Pulmonary:      Effort: Pulmonary effort is normal. No respiratory distress. Breath sounds: Normal breath sounds. No stridor. No wheezing or rales. Chest:      Chest wall: No tenderness. Abdominal:      General: Bowel sounds are normal. There is no distension. Palpations: Abdomen is soft. There is no mass. Tenderness: There is no tenderness. There is no guarding or rebound. Musculoskeletal: Normal range of motion. General: No tenderness. Comments: Corn/callus left little toe nail   Lymphadenopathy:      Cervical: No cervical adenopathy. Skin:     General: Skin is warm and dry. Coloration: Skin is not pale. Findings: No erythema or rash. Neurological:      Mental Status: She is alert and oriented to person, place, and time. Cranial Nerves: No cranial nerve deficit. Coordination: Coordination normal.      Deep Tendon Reflexes: Reflexes are normal and symmetric. Reflexes normal.   Psychiatric:         Behavior: Behavior normal.         Thought Content: Thought content normal.         Judgment: Judgment normal.       Lab Results   Component Value Date    CREATININE 0.9 12/13/2019    BUN 16 12/13/2019     12/13/2019    K 3.6 12/13/2019    CL 95 (L) 12/13/2019    CO2 24 12/13/2019           Assessment:      1. Pain of toe of left foot  2/2 to callus/corn left little toe  Avoid tight  Fitting shoes  Callus/corn removal otc  Keep appointment with Dr Vivian Sol schedule 2/12/2020    2. Essential hypertension  BP is at goal <140/90. Will continue current medication and low salt diet. Has  had recent renal function testing  ck  - BASIC METABOLIC PANEL;  Future          Plan:      AWV antwon Porter MD

## 2020-03-06 ENCOUNTER — OFFICE VISIT (OUTPATIENT)
Dept: PRIMARY CARE CLINIC | Age: 85
End: 2020-03-06
Payer: MEDICARE

## 2020-03-06 VITALS
HEIGHT: 66 IN | SYSTOLIC BLOOD PRESSURE: 136 MMHG | DIASTOLIC BLOOD PRESSURE: 80 MMHG | HEART RATE: 68 BPM | WEIGHT: 130.4 LBS | BODY MASS INDEX: 20.96 KG/M2 | OXYGEN SATURATION: 98 %

## 2020-03-06 PROCEDURE — G8482 FLU IMMUNIZE ORDER/ADMIN: HCPCS | Performed by: FAMILY MEDICINE

## 2020-03-06 PROCEDURE — 99215 OFFICE O/P EST HI 40 MIN: CPT | Performed by: FAMILY MEDICINE

## 2020-03-06 PROCEDURE — 1036F TOBACCO NON-USER: CPT | Performed by: FAMILY MEDICINE

## 2020-03-06 PROCEDURE — 4040F PNEUMOC VAC/ADMIN/RCVD: CPT | Performed by: FAMILY MEDICINE

## 2020-03-06 PROCEDURE — 1123F ACP DISCUSS/DSCN MKR DOCD: CPT | Performed by: FAMILY MEDICINE

## 2020-03-06 PROCEDURE — 1090F PRES/ABSN URINE INCON ASSESS: CPT | Performed by: FAMILY MEDICINE

## 2020-03-06 PROCEDURE — G8427 DOCREV CUR MEDS BY ELIG CLIN: HCPCS | Performed by: FAMILY MEDICINE

## 2020-03-06 PROCEDURE — G8420 CALC BMI NORM PARAMETERS: HCPCS | Performed by: FAMILY MEDICINE

## 2020-03-06 RX ORDER — HYDROCHLOROTHIAZIDE 25 MG/1
TABLET ORAL
Qty: 90 TABLET | Refills: 1 | Status: SHIPPED | OUTPATIENT
Start: 2020-03-06 | End: 2020-07-10 | Stop reason: SDUPTHER

## 2020-03-06 RX ORDER — AMLODIPINE BESYLATE 10 MG/1
TABLET ORAL
Qty: 90 TABLET | Refills: 3 | Status: SHIPPED | OUTPATIENT
Start: 2020-03-06 | End: 2021-02-23 | Stop reason: SDUPTHER

## 2020-03-06 ASSESSMENT — ENCOUNTER SYMPTOMS
STRIDOR: 0
SORE THROAT: 0
ABDOMINAL DISTENTION: 0
EYE REDNESS: 0
TROUBLE SWALLOWING: 0
DIARRHEA: 0
BLOOD IN STOOL: 0
CHEST TIGHTNESS: 0
COUGH: 0
RECTAL PAIN: 0
NAUSEA: 0
EYE ITCHING: 0
BLURRED VISION: 0
SINUS PRESSURE: 0
PHOTOPHOBIA: 0
CONSTIPATION: 0
ORTHOPNEA: 0
CHOKING: 0
WHEEZING: 0
EYE DISCHARGE: 0
BACK PAIN: 0
SHORTNESS OF BREATH: 0
APNEA: 0
RHINORRHEA: 0
EYE PAIN: 0
COLOR CHANGE: 0
VOMITING: 0

## 2020-03-06 ASSESSMENT — PATIENT HEALTH QUESTIONNAIRE - PHQ9
SUM OF ALL RESPONSES TO PHQ QUESTIONS 1-9: 0
SUM OF ALL RESPONSES TO PHQ QUESTIONS 1-9: 0

## 2020-03-06 ASSESSMENT — LIFESTYLE VARIABLES: HOW OFTEN DO YOU HAVE A DRINK CONTAINING ALCOHOL: 0

## 2020-03-06 NOTE — PROGRESS NOTES
Medicare Annual Wellness Visit  Name: Martir Pop Date: 3/6/2020   MRN: <[id number]> Sex: Female   Age: 80 y.o. Ethnicity: Non-/Non    : 1933 Race: Black      Subjective:      Patient ID: oSna Baer is a 80 y.o. female. Hypertension   This is a chronic problem. The current episode started more than 1 month ago. The problem is controlled. Pertinent negatives include no anxiety, blurred vision, chest pain, headaches, malaise/fatigue, neck pain, orthopnea, palpitations, peripheral edema, PND, shortness of breath or sweats. There are no associated agents to hypertension. Past treatments include diuretics and calcium channel blockers. The current treatment provides significant improvement. There are no compliance problems. There is no history of angina, kidney disease, CVA, heart failure, left ventricular hypertrophy, PVD or retinopathy. There is no history of chronic renal disease, coarctation of the aorta, hyperaldosteronism, hypercortisolism, hyperparathyroidism, a hypertension causing med, pheochromocytoma, renovascular disease, sleep apnea or a thyroid problem. Review of Systems   Constitutional: Negative for activity change, appetite change, chills, diaphoresis, fatigue, fever and malaise/fatigue. HENT: Negative for congestion, dental problem, drooling, ear discharge, ear pain, hearing loss, mouth sores, nosebleeds, postnasal drip, rhinorrhea, sinus pressure, sneezing, sore throat, tinnitus and trouble swallowing. Eyes: Negative for blurred vision, photophobia, pain, discharge, redness, itching and visual disturbance. Respiratory: Negative for apnea, cough, choking, chest tightness, shortness of breath, wheezing and stridor. Cardiovascular: Negative for chest pain, palpitations, orthopnea, leg swelling and PND. Gastrointestinal: Negative for abdominal distention, blood in stool, constipation, diarrhea, nausea, rectal pain and vomiting.    Genitourinary: Negative for decreased urine volume, difficulty urinating, dyspareunia, dysuria, enuresis, flank pain, frequency, genital sores, hematuria, menstrual problem, pelvic pain, urgency, vaginal bleeding and vaginal pain. Musculoskeletal: Negative for arthralgias, back pain, gait problem, joint swelling, myalgias, neck pain and neck stiffness. Skin: Negative for color change, pallor, rash and wound. Neurological: Negative for dizziness, tremors, seizures, syncope, facial asymmetry, speech difficulty, weakness, light-headedness, numbness and headaches. Hematological: Negative for adenopathy. Does not bruise/bleed easily. Psychiatric/Behavioral: Negative for agitation, behavioral problems, confusion, decreased concentration, dysphoric mood, hallucinations, self-injury, sleep disturbance and suicidal ideas. The patient is not nervous/anxious and is not hyperactive. Objective:   Physical Exam  Vitals signs and nursing note reviewed. Constitutional:       General: She is not in acute distress. Appearance: She is well-developed. She is not diaphoretic. HENT:      Head: Normocephalic and atraumatic. Right Ear: External ear normal.      Left Ear: External ear normal.      Nose: Nose normal.      Mouth/Throat:      Pharynx: No oropharyngeal exudate. Eyes:      General: No scleral icterus. Left eye: No discharge. Conjunctiva/sclera: Conjunctivae normal.      Pupils: Pupils are equal, round, and reactive to light. Neck:      Musculoskeletal: Normal range of motion and neck supple. Thyroid: No thyromegaly. Vascular: No JVD. Trachea: No tracheal deviation. Cardiovascular:      Rate and Rhythm: Normal rate and regular rhythm. Heart sounds: Normal heart sounds. No murmur. No friction rub. No gallop. Pulmonary:      Effort: Pulmonary effort is normal. No respiratory distress. Breath sounds: Normal breath sounds. No stridor. No wheezing or rales.    Chest:      Chest wall: No tenderness. Abdominal:      General: Bowel sounds are normal. There is no distension. Palpations: Abdomen is soft. There is no mass. Tenderness: There is no abdominal tenderness. There is no guarding or rebound. Musculoskeletal: Normal range of motion. General: No tenderness. Lymphadenopathy:      Cervical: No cervical adenopathy. Skin:     General: Skin is warm and dry. Coloration: Skin is not pale. Findings: No erythema or rash. Neurological:      Mental Status: She is alert and oriented to person, place, and time. Cranial Nerves: No cranial nerve deficit. Coordination: Coordination normal.      Deep Tendon Reflexes: Reflexes are normal and symmetric. Reflexes normal.   Psychiatric:         Behavior: Behavior normal.         Thought Content: Thought content normal.         Judgment: Judgment normal.         Assessment:      1. Routine general medical examination at a health care facility    2. Essential hypertension  BP is at goal <140/90. Will continue current medication and low salt diet. Has had recent renal function testing    - amLODIPine (NORVASC) 10 MG tablet; TAKE ONE TABLET BY MOUTH ONCE DAILY  Dispense: 90 tablet; Refill: 3  - hydroCHLOROthiazide (HYDRODIURIL) 25 MG tablet; TAKE 1 TABLET BY MOUTH ONCE DAILY  Dispense: 90 tablet; Refill: 1          Plan:              Fartun Dubose MD  Jovan Eric is here for Medicare AWV and Medication Refill (med pended)    Screenings for behavioral, psychosocial and functional/safety risks, and cognitive dysfunction are all negative except as indicated below. These results, as well as other patient data from the [address] form, are documented in Flowsheets linked to this Encounter. No Known Allergies    Prior to Visit Medications    Medication Sig Taking?  Authorizing Provider   amLODIPine (NORVASC) 10 MG tablet TAKE ONE TABLET BY MOUTH ONCE DAILY Yes Jaden Youngblood MD   hydroCHLOROthiazide and time, well developed and well- nourished, in no acute distress  Skin: warm and dry, no rash or erythema  Head: normocephalic and atraumatic  Eyes: pupils equal, round, and reactive to light, extraocular eye movements intact, conjunctivae normal  ENT: tympanic membrane, external ear and ear canal normal bilaterally, nose without deformity, nasal mucosa and turbinates normal without polyps  Neck: supple and non-tender without mass, no thyromegaly or thyroid nodules, no cervical lymphadenopathy  Pulmonary/Chest: clear to auscultation bilaterally- no wheezes, rales or rhonchi, normal air movement, no respiratory distress  Cardiovascular: normal rate, regular rhythm, normal S1 and S2, no murmurs, rubs, clicks, or gallops, distal pulses intact, no carotid bruits  Abdomen: soft, non-tender, non-distended, normal bowel sounds, no masses or organomegaly  Extremities: no cyanosis, clubbing or edema  Musculoskeletal: normal range of motion, no joint swelling, deformity or tenderness  Neurologic: reflexes normal and symmetric, no cranial nerve deficit, gait, coordination and speech normal    Patient's complete Health Risk Assessment and screening values have been reviewed and are found in Flowsheets. The following problems were reviewed today and where indicated follow up appointments were made and/or referrals ordered. Positive Risk Factor Screenings with Interventions:     Cognitive:   Words recalled: 0 Words Recalled  Clock Drawing Test (CDT) Score: (!) Abnormal  Total Score Interpretation: Positive Mini-Cog  Did the patient refuse to take the cognition test?: No  Cognitive Impairment Interventions:  · Patient declines any further evaluation/treatment for cognitive impairment    Health Habits/Nutrition:  Health Habits/Nutrition  Do you exercise for at least 20 minutes 2-3 times per week?: Yes  Have you lost any weight without trying in the past 3 months?: No  Do you eat fewer than 2 meals per day?: No  Have you seen a

## 2020-03-14 ENCOUNTER — APPOINTMENT (OUTPATIENT)
Dept: GENERAL RADIOLOGY | Age: 85
End: 2020-03-14
Payer: MEDICARE

## 2020-03-14 ENCOUNTER — TELEPHONE (OUTPATIENT)
Dept: PRIMARY CARE CLINIC | Age: 85
End: 2020-03-14

## 2020-03-14 ENCOUNTER — HOSPITAL ENCOUNTER (EMERGENCY)
Age: 85
Discharge: HOME | End: 2020-03-14
Attending: EMERGENCY MEDICINE
Payer: MEDICARE

## 2020-03-14 VITALS
HEIGHT: 66 IN | BODY MASS INDEX: 21.15 KG/M2 | TEMPERATURE: 98.7 F | RESPIRATION RATE: 14 BRPM | OXYGEN SATURATION: 100 % | HEART RATE: 72 BPM | SYSTOLIC BLOOD PRESSURE: 133 MMHG | DIASTOLIC BLOOD PRESSURE: 74 MMHG | WEIGHT: 131.61 LBS

## 2020-03-14 LAB
ALBUMIN SERPL-MCNC: 3.9 G/DL (ref 3.4–5)
ALP BLD-CCNC: 65 U/L (ref 40–129)
ALT SERPL-CCNC: 13 U/L (ref 10–40)
ANION GAP SERPL CALCULATED.3IONS-SCNC: 15 MMOL/L (ref 3–16)
AST SERPL-CCNC: 22 U/L (ref 15–37)
BASOPHILS ABSOLUTE: 0 K/UL (ref 0–0.2)
BASOPHILS RELATIVE PERCENT: 0.7 %
BILIRUB SERPL-MCNC: 0.3 MG/DL (ref 0–1)
BILIRUBIN DIRECT: <0.2 MG/DL (ref 0–0.3)
BILIRUBIN URINE: NEGATIVE
BILIRUBIN, INDIRECT: NORMAL MG/DL (ref 0–1)
BLOOD, URINE: NEGATIVE
BUN BLDV-MCNC: 25 MG/DL (ref 7–20)
CALCIUM SERPL-MCNC: 9.5 MG/DL (ref 8.3–10.6)
CHLORIDE BLD-SCNC: 91 MMOL/L (ref 99–110)
CLARITY: CLEAR
CO2: 24 MMOL/L (ref 21–32)
COLOR: YELLOW
CREAT SERPL-MCNC: 0.9 MG/DL (ref 0.6–1.2)
EOSINOPHILS ABSOLUTE: 0 K/UL (ref 0–0.6)
EOSINOPHILS RELATIVE PERCENT: 0.7 %
EPITHELIAL CELLS, UA: 0 /HPF (ref 0–5)
GFR AFRICAN AMERICAN: >60
GFR NON-AFRICAN AMERICAN: 59
GLUCOSE BLD-MCNC: 104 MG/DL (ref 70–99)
GLUCOSE URINE: NEGATIVE MG/DL
HCT VFR BLD CALC: 38 % (ref 36–48)
HEMOGLOBIN: 12.7 G/DL (ref 12–16)
HYALINE CASTS: 0 /LPF (ref 0–8)
KETONES, URINE: NEGATIVE MG/DL
LEUKOCYTE ESTERASE, URINE: ABNORMAL
LIPASE: 57 U/L (ref 13–60)
LYMPHOCYTES ABSOLUTE: 1.6 K/UL (ref 1–5.1)
LYMPHOCYTES RELATIVE PERCENT: 24.4 %
MCH RBC QN AUTO: 27.5 PG (ref 26–34)
MCHC RBC AUTO-ENTMCNC: 33.4 G/DL (ref 31–36)
MCV RBC AUTO: 82.5 FL (ref 80–100)
MICROSCOPIC EXAMINATION: YES
MONOCYTES ABSOLUTE: 0.7 K/UL (ref 0–1.3)
MONOCYTES RELATIVE PERCENT: 11.2 %
NEUTROPHILS ABSOLUTE: 4.1 K/UL (ref 1.7–7.7)
NEUTROPHILS RELATIVE PERCENT: 63 %
NITRITE, URINE: NEGATIVE
PDW BLD-RTO: 14.3 % (ref 12.4–15.4)
PH UA: 6.5 (ref 5–8)
PLATELET # BLD: 293 K/UL (ref 135–450)
PMV BLD AUTO: 7.8 FL (ref 5–10.5)
POTASSIUM REFLEX MAGNESIUM: 3.8 MMOL/L (ref 3.5–5.1)
PRO-BNP: 136 PG/ML (ref 0–449)
PROTEIN UA: NEGATIVE MG/DL
RAPID INFLUENZA  B AGN: NEGATIVE
RAPID INFLUENZA A AGN: NEGATIVE
RBC # BLD: 4.61 M/UL (ref 4–5.2)
RBC UA: 1 /HPF (ref 0–4)
SODIUM BLD-SCNC: 130 MMOL/L (ref 136–145)
SPECIFIC GRAVITY UA: 1.01 (ref 1–1.03)
TOTAL PROTEIN: 7.4 G/DL (ref 6.4–8.2)
TROPONIN: <0.01 NG/ML
URINE TYPE: ABNORMAL
UROBILINOGEN, URINE: 0.2 E.U./DL
WBC # BLD: 6.5 K/UL (ref 4–11)
WBC UA: 1 /HPF (ref 0–5)

## 2020-03-14 PROCEDURE — 85025 COMPLETE CBC W/AUTO DIFF WBC: CPT

## 2020-03-14 PROCEDURE — 99283 EMERGENCY DEPT VISIT LOW MDM: CPT

## 2020-03-14 PROCEDURE — 96361 HYDRATE IV INFUSION ADD-ON: CPT

## 2020-03-14 PROCEDURE — 83690 ASSAY OF LIPASE: CPT

## 2020-03-14 PROCEDURE — 81001 URINALYSIS AUTO W/SCOPE: CPT

## 2020-03-14 PROCEDURE — 96374 THER/PROPH/DIAG INJ IV PUSH: CPT

## 2020-03-14 PROCEDURE — 83880 ASSAY OF NATRIURETIC PEPTIDE: CPT

## 2020-03-14 PROCEDURE — 2580000003 HC RX 258: Performed by: EMERGENCY MEDICINE

## 2020-03-14 PROCEDURE — 84484 ASSAY OF TROPONIN QUANT: CPT

## 2020-03-14 PROCEDURE — 93005 ELECTROCARDIOGRAM TRACING: CPT | Performed by: EMERGENCY MEDICINE

## 2020-03-14 PROCEDURE — 80076 HEPATIC FUNCTION PANEL: CPT

## 2020-03-14 PROCEDURE — 87804 INFLUENZA ASSAY W/OPTIC: CPT

## 2020-03-14 PROCEDURE — 6360000002 HC RX W HCPCS: Performed by: EMERGENCY MEDICINE

## 2020-03-14 PROCEDURE — 80048 BASIC METABOLIC PNL TOTAL CA: CPT

## 2020-03-14 PROCEDURE — 71046 X-RAY EXAM CHEST 2 VIEWS: CPT

## 2020-03-14 RX ORDER — 0.9 % SODIUM CHLORIDE 0.9 %
500 INTRAVENOUS SOLUTION INTRAVENOUS ONCE
Status: COMPLETED | OUTPATIENT
Start: 2020-03-14 | End: 2020-03-14

## 2020-03-14 RX ORDER — ONDANSETRON 4 MG/1
4 TABLET, FILM COATED ORAL EVERY 8 HOURS PRN
Qty: 30 TABLET | Refills: 0 | Status: SHIPPED | OUTPATIENT
Start: 2020-03-14 | End: 2021-09-29 | Stop reason: ALTCHOICE

## 2020-03-14 RX ORDER — ONDANSETRON 2 MG/ML
4 INJECTION INTRAMUSCULAR; INTRAVENOUS ONCE
Status: COMPLETED | OUTPATIENT
Start: 2020-03-14 | End: 2020-03-14

## 2020-03-14 RX ADMIN — SODIUM CHLORIDE 500 ML: 9 INJECTION, SOLUTION INTRAVENOUS at 14:48

## 2020-03-14 RX ADMIN — ONDANSETRON 4 MG: 2 INJECTION INTRAMUSCULAR; INTRAVENOUS at 16:13

## 2020-03-14 ASSESSMENT — ENCOUNTER SYMPTOMS
VOMITING: 1
COUGH: 1
NAUSEA: 1
SHORTNESS OF BREATH: 0
WHEEZING: 0
DIARRHEA: 0
ABDOMINAL PAIN: 0

## 2020-03-14 NOTE — ED NOTES
Discharge and education instructions reviewed. Patient verbalized understanding, teach-back successful. Patient denied questions at this time. No acute distress noted. Patient instructed to follow-up as noted - return to emergency department if symptoms worsen. Patient verbalized understanding. Discharged per EDMD with discharged instructions.        Elena Hidalgo RN  03/14/20 0434

## 2020-03-14 NOTE — ED PROVIDER NOTES
[facility]      Pt Name: Krista Yen  MRN: [id number]  [place] 5/23/1933  Date of evaluation: 3/14/2020  Provider: London Rainey MD    [address] [place] [address]       Chief Complaint   Patient presents with    Nausea     pt states nausea for one week. pt states \"I can drink orange juice but not apple juice\"    Cough         HISTORY OF PRESENT ILLNESS   (Location/Symptom, Timing/Onset, Context/Setting, Quality, Duration, Modifying Factors, Severity)  Note limiting factors. Krista Yen is a 80 y.o. female who presents to the emergency department with cough nausea and intermittent vomiting for about 2 to 3 days. HPI  This is a pleasant 80-year-old -American female who is a resident of assisted living facility who about 2 to 3 days ago developed a nonproductive cough and then some nausea and intermittent emesis. She denies any diarrhea or associate abdominal pain no fevers chills no history of ill contacts that she knows of. Denies any urgency dysuria frequency. She is able to tolerate some foods and some fluids but apparently some have been giving her trouble and he tends to throw them up immediately, the particular example was apple juice. No previous episodes of this no recent travel no [place] VID risk factors. Nursing Notes were reviewed. REVIEW OF SYSTEMS    (2-9 systems for level 4, 10 or more for level 5)     Review of Systems   Constitutional: Negative for chills and fever. Respiratory: Positive for cough. Negative for shortness of breath and wheezing. Cardiovascular: Negative for chest pain, palpitations and leg swelling. Gastrointestinal: Positive for nausea and vomiting. Negative for abdominal pain and diarrhea. Genitourinary: Negative for dysuria and urgency. All other systems reviewed and are negative. Except as noted above the remainder of the review of systems was reviewed and negative.        PAST MEDICAL HISTORY     Past Medical History:   Diagnosis Date    Chronic back pain     Hypertension          SURGICAL HISTORY       Past Surgical History:   Procedure Laterality Date    TOE SURGERY           CURRENT MEDICATIONS       Previous Medications    ALENDRONATE (FOSAMAX) 70 MG TABLET    TAKE ONE TABLET BY MOUTH ONCE A WEEK    AMLODIPINE (NORVASC) 10 MG TABLET    TAKE ONE TABLET BY MOUTH ONCE DAILY    ASPIRIN 81 MG TABLET    Take 81 mg by mouth daily    CLOTRIMAZOLE-BETAMETHASONE (LOTRISONE) 1-0.05 % CREAM    Apply topically 2 times daily and cancel script for triamcinolone 0.1 % cream    HYDROCHLOROTHIAZIDE (HYDRODIURIL) 25 MG TABLET    TAKE 1 TABLET BY MOUTH ONCE DAILY    LOSARTAN (COZAAR) 100 MG TABLET    TAKE ONE TABLET BY MOUTH ONCE DAILY    METOPROLOL SUCCINATE (TOPROL XL) 50 MG EXTENDED RELEASE TABLET    TAKE 1 TABLET BY MOUTH TWICE DAILY    MULTIPLE VITAMIN (MULTIVITAMINS PO)    Take by mouth    VITAMIN D (CHOLECALCIFEROL) 1000 UNIT TABS TABLET    Take 1,000 Units by mouth daily       ALLERGIES     Patient has no known allergies. FAMILY HISTORY       Family History   Problem Relation Age of Onset    Diabetes Mother     Diabetes Sister           SOCIAL HISTORY       Social History     Socioeconomic History    Marital status:       Spouse name: None    Number of children: None    Years of education: None    Highest education level: None   Occupational History    Occupation: Retired   Social Needs    Financial resource strain: None    Food insecurity     Worry: None     Inability: None    Transportation needs     Medical: None     Non-medical: None   Tobacco Use    Smoking status: Never Smoker    Smokeless tobacco: Never Used   Substance and Sexual Activity    Alcohol use: No    Drug use: No    Sexual activity: Never   Lifestyle    Physical activity     Days per week: None     Minutes per session: None    Stress: None   Relationships    Social connections     Talks on phone: None Mental Status: She is alert and oriented to person, place, and time. Mental status is at baseline. Cranial Nerves: No cranial nerve deficit. Psychiatric:         Mood and Affect: Mood normal.         Behavior: Behavior normal.         DIAGNOSTIC RESULTS     EKG: All EKG's are interpreted by the Emergency Department Physician who either signs or Co-signs this chart in the absence of a cardiologist.    EKG Interpretation    Interpreted by emergency department physician    Rhythm: normal sinus   Rate: normal  Axis: normal  Ectopy: none  Conduction: normal  ST Segments: normal  T Waves: normal  Q Waves: III    Clinical Impression: non-specific EKG    [place]    RADIOLOGY:   Non-plain film images such as CT, Ultrasound and MRI are read by the radiologist. Plain radiographic images are visualized and preliminarily interpreted by the emergency physician with the below findings:        Interpretation per the Radiologist below, if available at the time of this note:    XR CHEST STANDARD (2 VW)   Final Result   No acute process.                ED BEDSIDE ULTRASOUND:   Performed by ED Physician - none    LABS:  Labs Reviewed   BASIC METABOLIC PANEL W/ REFLEX TO MG FOR LOW K - Abnormal; Notable for the following components:       Result Value    Sodium 130 (*)     Chloride 91 (*)     Glucose 104 (*)     BUN 25 (*)     GFR Non- 59 (*)     All other components within normal limits    Narrative:     Performed at:  [facility]  [address] [place], [place] [address]   Phone [phone]   URINALYSIS - Abnormal; Notable for the following components:    Leukocyte Esterase, Urine TRACE (*)     All other components within normal limits    Narrative:     Performed at:  [facility]  [address] [place], [place] [address]   Phone [phone]   RAPID INFLUENZA A/B ANTIGENS    Narrative:     Performed at:  [facility]

## 2020-03-14 NOTE — TELEPHONE ENCOUNTER
Patient has not been eating and coughing and weak and lying around. Daughter was instructed to take her to the emergency room.

## 2020-03-15 LAB
EKG ATRIAL RATE: 66 BPM
EKG DIAGNOSIS: NORMAL
EKG P AXIS: 75 DEGREES
EKG P-R INTERVAL: 192 MS
EKG Q-T INTERVAL: 396 MS
EKG QRS DURATION: 76 MS
EKG QTC CALCULATION (BAZETT): 415 MS
EKG R AXIS: 8 DEGREES
EKG T AXIS: 64 DEGREES
EKG VENTRICULAR RATE: 66 BPM

## 2020-03-15 PROCEDURE — 93010 ELECTROCARDIOGRAM REPORT: CPT | Performed by: INTERNAL MEDICINE

## 2020-03-18 ENCOUNTER — OFFICE VISIT (OUTPATIENT)
Dept: PRIMARY CARE CLINIC | Age: 85
End: 2020-03-18
Payer: MEDICARE

## 2020-03-18 VITALS
BODY MASS INDEX: 20.89 KG/M2 | DIASTOLIC BLOOD PRESSURE: 61 MMHG | RESPIRATION RATE: 16 BRPM | WEIGHT: 130 LBS | OXYGEN SATURATION: 98 % | HEIGHT: 66 IN | HEART RATE: 65 BPM | SYSTOLIC BLOOD PRESSURE: 118 MMHG | TEMPERATURE: 97.9 F

## 2020-03-18 PROBLEM — G30.1 LATE ONSET ALZHEIMER'S DISEASE WITH BEHAVIORAL DISTURBANCE (HCC): Status: ACTIVE | Noted: 2020-03-18

## 2020-03-18 PROBLEM — F02.818 LATE ONSET ALZHEIMER'S DISEASE WITH BEHAVIORAL DISTURBANCE (HCC): Status: ACTIVE | Noted: 2020-03-18

## 2020-03-18 PROCEDURE — G8482 FLU IMMUNIZE ORDER/ADMIN: HCPCS | Performed by: FAMILY MEDICINE

## 2020-03-18 PROCEDURE — G8427 DOCREV CUR MEDS BY ELIG CLIN: HCPCS | Performed by: FAMILY MEDICINE

## 2020-03-18 PROCEDURE — 1123F ACP DISCUSS/DSCN MKR DOCD: CPT | Performed by: FAMILY MEDICINE

## 2020-03-18 PROCEDURE — 1090F PRES/ABSN URINE INCON ASSESS: CPT | Performed by: FAMILY MEDICINE

## 2020-03-18 PROCEDURE — 99214 OFFICE O/P EST MOD 30 MIN: CPT | Performed by: FAMILY MEDICINE

## 2020-03-18 PROCEDURE — G8420 CALC BMI NORM PARAMETERS: HCPCS | Performed by: FAMILY MEDICINE

## 2020-03-18 PROCEDURE — 1036F TOBACCO NON-USER: CPT | Performed by: FAMILY MEDICINE

## 2020-03-18 PROCEDURE — 4040F PNEUMOC VAC/ADMIN/RCVD: CPT | Performed by: FAMILY MEDICINE

## 2020-03-18 RX ORDER — DONEPEZIL HYDROCHLORIDE 5 MG/1
5 TABLET, FILM COATED ORAL NIGHTLY
Qty: 30 TABLET | Refills: 3 | Status: SHIPPED
Start: 2020-03-18 | End: 2020-03-28 | Stop reason: SINTOL

## 2020-03-18 ASSESSMENT — ENCOUNTER SYMPTOMS
RHINORRHEA: 0
CONSTIPATION: 0
RECTAL PAIN: 0
NAUSEA: 0
CHEST TIGHTNESS: 0
ABDOMINAL DISTENTION: 0
BLOOD IN STOOL: 0
EYE ITCHING: 0
SORE THROAT: 0
DIARRHEA: 0
BACK PAIN: 0
TROUBLE SWALLOWING: 0
COUGH: 0
CHOKING: 0
PHOTOPHOBIA: 0
EYE PAIN: 0
EYE DISCHARGE: 0
SHORTNESS OF BREATH: 0
APNEA: 0
STRIDOR: 0
VOMITING: 0
SINUS PRESSURE: 0
WHEEZING: 0
COLOR CHANGE: 0
EYE REDNESS: 0

## 2020-03-18 NOTE — PROGRESS NOTES
pain and neck stiffness. Skin: Negative for color change, pallor, rash and wound. Neurological: Negative for dizziness, tremors, seizures, syncope, facial asymmetry, speech difficulty, weakness, light-headedness, numbness and headaches. Hematological: Negative for adenopathy. Does not bruise/bleed easily. Psychiatric/Behavioral: Negative for agitation, behavioral problems, confusion, decreased concentration, dysphoric mood, hallucinations, self-injury, sleep disturbance and suicidal ideas. The patient is not nervous/anxious and is not hyperactive. Objective:   Physical Exam  Vitals signs and nursing note reviewed. Constitutional:       General: She is not in acute distress. Appearance: She is well-developed. She is not diaphoretic. HENT:      Head: Normocephalic and atraumatic. Right Ear: External ear normal.      Left Ear: External ear normal.      Nose: Nose normal.      Mouth/Throat:      Pharynx: No oropharyngeal exudate. Eyes:      General: No scleral icterus. Left eye: No discharge. Conjunctiva/sclera: Conjunctivae normal.      Pupils: Pupils are equal, round, and reactive to light. Neck:      Musculoskeletal: Normal range of motion and neck supple. Thyroid: No thyromegaly. Vascular: No JVD. Trachea: No tracheal deviation. Cardiovascular:      Rate and Rhythm: Normal rate and regular rhythm. Heart sounds: Normal heart sounds. No murmur. No friction rub. No gallop. Pulmonary:      Effort: Pulmonary effort is normal. No respiratory distress. Breath sounds: Normal breath sounds. No stridor. No wheezing or rales. Chest:      Chest wall: No tenderness. Abdominal:      General: Bowel sounds are normal. There is no distension. Palpations: Abdomen is soft. There is no mass. Tenderness: There is no abdominal tenderness. There is no guarding or rebound. Musculoskeletal: Normal range of motion. General: No tenderness. Lymphadenopathy:      Cervical: No cervical adenopathy. Skin:     General: Skin is warm and dry. Coloration: Skin is not pale. Findings: No erythema or rash. Neurological:      Mental Status: She is alert and oriented to person, place, and time. Cranial Nerves: No cranial nerve deficit. Coordination: Coordination normal.      Deep Tendon Reflexes: Reflexes are normal and symmetric. Reflexes normal.   Psychiatric:         Behavior: Behavior normal.         Thought Content: Thought content normal.         Judgment: Judgment normal.         Assessment:      1. Late onset Alzheimer's disease with behavioral disturbance (HCC)  Trial of  - donepezil (ARICEPT) 5 MG tablet; Take 1 tablet by mouth nightly  Dispense: 30 tablet; Refill: 3    2.  Flu-like symptoms  Improved  With current treatment    Need to monitor  wt          Plan:              Sal Valdez MD

## 2020-03-25 ENCOUNTER — PATIENT MESSAGE (OUTPATIENT)
Dept: PRIMARY CARE CLINIC | Age: 85
End: 2020-03-25

## 2020-03-26 NOTE — TELEPHONE ENCOUNTER
From: Paula Grayson  To: Julio Cesar Meza MD  Sent: 3/25/2020 6:01 PM EDT  Subject: Prescription Question    A new prescription was recently filled for Donepezil HCL 5MG TAB SOL  The instructions are to take 1 tablet by mouth nightly. After taking for 3 days there was noticeable change in sleeping habits, speech was rambled and incomplete sentences. Should usage be discontinued?

## 2020-03-27 ENCOUNTER — PATIENT MESSAGE (OUTPATIENT)
Dept: PRIMARY CARE CLINIC | Age: 85
End: 2020-03-27

## 2020-03-27 NOTE — TELEPHONE ENCOUNTER
From: Chiqui Baig  To: Sal Valdez MD  Sent: 3/27/2020 12:36 PM EDT  Subject: Prescription Question    A message was sent on 3/25 regarding a new prescription and the effects on the patient. There has not been a response as to whether or not taking the medicine should continue. Please advise.

## 2020-03-28 ENCOUNTER — TELEPHONE (OUTPATIENT)
Dept: PRIMARY CARE CLINIC | Age: 85
End: 2020-03-28

## 2020-04-01 ENCOUNTER — APPOINTMENT (OUTPATIENT)
Dept: GENERAL RADIOLOGY | Age: 85
DRG: 351 | End: 2020-04-01
Payer: MEDICARE

## 2020-04-01 ENCOUNTER — APPOINTMENT (OUTPATIENT)
Dept: CT IMAGING | Age: 85
DRG: 351 | End: 2020-04-01
Payer: MEDICARE

## 2020-04-01 ENCOUNTER — HOSPITAL ENCOUNTER (INPATIENT)
Age: 85
LOS: 2 days | Discharge: HOME | DRG: 351 | End: 2020-04-04
Attending: EMERGENCY MEDICINE | Admitting: INTERNAL MEDICINE
Payer: MEDICARE

## 2020-04-01 LAB
ALBUMIN SERPL-MCNC: 4.5 G/DL (ref 3.4–5)
ALP BLD-CCNC: 66 U/L (ref 40–129)
ALT SERPL-CCNC: 16 U/L (ref 10–40)
ANION GAP SERPL CALCULATED.3IONS-SCNC: 15 MMOL/L (ref 3–16)
AST SERPL-CCNC: 26 U/L (ref 15–37)
BASOPHILS ABSOLUTE: 0 K/UL (ref 0–0.2)
BASOPHILS RELATIVE PERCENT: 0.1 %
BILIRUB SERPL-MCNC: 0.6 MG/DL (ref 0–1)
BILIRUBIN DIRECT: <0.2 MG/DL (ref 0–0.3)
BILIRUBIN, INDIRECT: ABNORMAL MG/DL (ref 0–1)
BUN BLDV-MCNC: 29 MG/DL (ref 7–20)
CALCIUM SERPL-MCNC: 10.8 MG/DL (ref 8.3–10.6)
CHLORIDE BLD-SCNC: 95 MMOL/L (ref 99–110)
CO2: 28 MMOL/L (ref 21–32)
CREAT SERPL-MCNC: 1.3 MG/DL (ref 0.6–1.2)
EOSINOPHILS ABSOLUTE: 0 K/UL (ref 0–0.6)
EOSINOPHILS RELATIVE PERCENT: 0 %
GFR AFRICAN AMERICAN: 47
GFR NON-AFRICAN AMERICAN: 39
GLUCOSE BLD-MCNC: 143 MG/DL (ref 70–99)
HCT VFR BLD CALC: 44 % (ref 36–48)
HEMOGLOBIN: 14.5 G/DL (ref 12–16)
LIPASE: 39 U/L (ref 13–60)
LYMPHOCYTES ABSOLUTE: 0.6 K/UL (ref 1–5.1)
LYMPHOCYTES RELATIVE PERCENT: 6.7 %
MCH RBC QN AUTO: 27.7 PG (ref 26–34)
MCHC RBC AUTO-ENTMCNC: 32.9 G/DL (ref 31–36)
MCV RBC AUTO: 84 FL (ref 80–100)
MONOCYTES ABSOLUTE: 0.6 K/UL (ref 0–1.3)
MONOCYTES RELATIVE PERCENT: 6.6 %
NEUTROPHILS ABSOLUTE: 8.1 K/UL (ref 1.7–7.7)
NEUTROPHILS RELATIVE PERCENT: 86.6 %
PDW BLD-RTO: 14.4 % (ref 12.4–15.4)
PLATELET # BLD: 280 K/UL (ref 135–450)
PMV BLD AUTO: 8.5 FL (ref 5–10.5)
POTASSIUM SERPL-SCNC: 3.4 MMOL/L (ref 3.5–5.1)
PRO-BNP: 210 PG/ML (ref 0–449)
RBC # BLD: 5.24 M/UL (ref 4–5.2)
SODIUM BLD-SCNC: 138 MMOL/L (ref 136–145)
TOTAL PROTEIN: 8.3 G/DL (ref 6.4–8.2)
TROPONIN: <0.01 NG/ML
WBC # BLD: 9.4 K/UL (ref 4–11)

## 2020-04-01 PROCEDURE — 83690 ASSAY OF LIPASE: CPT

## 2020-04-01 PROCEDURE — 6360000004 HC RX CONTRAST MEDICATION: Performed by: PHYSICIAN ASSISTANT

## 2020-04-01 PROCEDURE — 99285 EMERGENCY DEPT VISIT HI MDM: CPT

## 2020-04-01 PROCEDURE — 2580000003 HC RX 258: Performed by: PHYSICIAN ASSISTANT

## 2020-04-01 PROCEDURE — 80076 HEPATIC FUNCTION PANEL: CPT

## 2020-04-01 PROCEDURE — 74177 CT ABD & PELVIS W/CONTRAST: CPT

## 2020-04-01 PROCEDURE — 71046 X-RAY EXAM CHEST 2 VIEWS: CPT

## 2020-04-01 PROCEDURE — 83880 ASSAY OF NATRIURETIC PEPTIDE: CPT

## 2020-04-01 PROCEDURE — 93005 ELECTROCARDIOGRAM TRACING: CPT | Performed by: EMERGENCY MEDICINE

## 2020-04-01 PROCEDURE — 80048 BASIC METABOLIC PNL TOTAL CA: CPT

## 2020-04-01 PROCEDURE — 85025 COMPLETE CBC W/AUTO DIFF WBC: CPT

## 2020-04-01 PROCEDURE — 84484 ASSAY OF TROPONIN QUANT: CPT

## 2020-04-01 RX ORDER — 0.9 % SODIUM CHLORIDE 0.9 %
500 INTRAVENOUS SOLUTION INTRAVENOUS ONCE
Status: COMPLETED | OUTPATIENT
Start: 2020-04-01 | End: 2020-04-02

## 2020-04-01 RX ADMIN — IOPAMIDOL 75 ML: 755 INJECTION, SOLUTION INTRAVENOUS at 22:43

## 2020-04-01 RX ADMIN — SODIUM CHLORIDE 500 ML: 9 INJECTION, SOLUTION INTRAVENOUS at 23:25

## 2020-04-02 ENCOUNTER — ANESTHESIA EVENT (OUTPATIENT)
Dept: OPERATING ROOM | Age: 85
DRG: 351 | End: 2020-04-02
Payer: MEDICARE

## 2020-04-02 ENCOUNTER — ANESTHESIA (OUTPATIENT)
Dept: OPERATING ROOM | Age: 85
DRG: 351 | End: 2020-04-02
Payer: MEDICARE

## 2020-04-02 VITALS — OXYGEN SATURATION: 98 % | SYSTOLIC BLOOD PRESSURE: 152 MMHG | DIASTOLIC BLOOD PRESSURE: 90 MMHG

## 2020-04-02 PROBLEM — K56.609 SMALL BOWEL OBSTRUCTION (HCC): Status: ACTIVE | Noted: 2020-04-02

## 2020-04-02 PROBLEM — E83.52 HYPERCALCEMIA: Status: ACTIVE | Noted: 2020-04-02

## 2020-04-02 PROBLEM — E87.6 HYPOKALEMIA: Status: ACTIVE | Noted: 2020-04-02

## 2020-04-02 PROBLEM — E86.0 DEHYDRATION: Status: ACTIVE | Noted: 2020-04-02

## 2020-04-02 PROBLEM — N18.9 ACUTE KIDNEY INJURY SUPERIMPOSED ON CHRONIC KIDNEY DISEASE (HCC): Status: ACTIVE | Noted: 2020-04-02

## 2020-04-02 PROBLEM — R10.32 LEFT LOWER QUADRANT ABDOMINAL PAIN: Status: ACTIVE | Noted: 2020-04-02

## 2020-04-02 PROBLEM — N17.9 ACUTE KIDNEY INJURY SUPERIMPOSED ON CHRONIC KIDNEY DISEASE (HCC): Status: ACTIVE | Noted: 2020-04-02

## 2020-04-02 LAB
A/G RATIO: 1 (ref 1.1–2.2)
ALBUMIN SERPL-MCNC: 3.8 G/DL (ref 3.4–5)
ALP BLD-CCNC: 60 U/L (ref 40–129)
ALT SERPL-CCNC: 13 U/L (ref 10–40)
ANION GAP SERPL CALCULATED.3IONS-SCNC: 15 MMOL/L (ref 3–16)
AST SERPL-CCNC: 23 U/L (ref 15–37)
BASOPHILS ABSOLUTE: 0 K/UL (ref 0–0.2)
BASOPHILS RELATIVE PERCENT: 0.1 %
BILIRUB SERPL-MCNC: 0.5 MG/DL (ref 0–1)
BUN BLDV-MCNC: 29 MG/DL (ref 7–20)
CALCIUM SERPL-MCNC: 10.2 MG/DL (ref 8.3–10.6)
CHLORIDE BLD-SCNC: 99 MMOL/L (ref 99–110)
CO2: 25 MMOL/L (ref 21–32)
CREAT SERPL-MCNC: 1 MG/DL (ref 0.6–1.2)
EKG ATRIAL RATE: 81 BPM
EKG DIAGNOSIS: NORMAL
EKG P AXIS: 70 DEGREES
EKG P-R INTERVAL: 176 MS
EKG Q-T INTERVAL: 376 MS
EKG QRS DURATION: 78 MS
EKG QTC CALCULATION (BAZETT): 436 MS
EKG R AXIS: -3 DEGREES
EKG T AXIS: 74 DEGREES
EKG VENTRICULAR RATE: 81 BPM
EOSINOPHILS ABSOLUTE: 0 K/UL (ref 0–0.6)
EOSINOPHILS RELATIVE PERCENT: 0.1 %
GFR AFRICAN AMERICAN: >60
GFR NON-AFRICAN AMERICAN: 52
GLOBULIN: 3.8 G/DL
GLUCOSE BLD-MCNC: 131 MG/DL (ref 70–99)
HCT VFR BLD CALC: 40.4 % (ref 36–48)
HEMOGLOBIN: 13.6 G/DL (ref 12–16)
LYMPHOCYTES ABSOLUTE: 1.2 K/UL (ref 1–5.1)
LYMPHOCYTES RELATIVE PERCENT: 15.6 %
MAGNESIUM: 2.3 MG/DL (ref 1.8–2.4)
MCH RBC QN AUTO: 28 PG (ref 26–34)
MCHC RBC AUTO-ENTMCNC: 33.8 G/DL (ref 31–36)
MCV RBC AUTO: 82.9 FL (ref 80–100)
MONOCYTES ABSOLUTE: 0.9 K/UL (ref 0–1.3)
MONOCYTES RELATIVE PERCENT: 11.5 %
NEUTROPHILS ABSOLUTE: 5.8 K/UL (ref 1.7–7.7)
NEUTROPHILS RELATIVE PERCENT: 72.7 %
PDW BLD-RTO: 14.8 % (ref 12.4–15.4)
PHOSPHORUS: 5.1 MG/DL (ref 2.5–4.9)
PLATELET # BLD: 250 K/UL (ref 135–450)
PMV BLD AUTO: 7.8 FL (ref 5–10.5)
POTASSIUM SERPL-SCNC: 3.5 MMOL/L (ref 3.5–5.1)
RBC # BLD: 4.87 M/UL (ref 4–5.2)
SODIUM BLD-SCNC: 139 MMOL/L (ref 136–145)
TOTAL PROTEIN: 7.6 G/DL (ref 6.4–8.2)
WBC # BLD: 8 K/UL (ref 4–11)

## 2020-04-02 PROCEDURE — 2500000003 HC RX 250 WO HCPCS: Performed by: SURGERY

## 2020-04-02 PROCEDURE — 1200000000 HC SEMI PRIVATE

## 2020-04-02 PROCEDURE — 99222 1ST HOSP IP/OBS MODERATE 55: CPT | Performed by: SURGERY

## 2020-04-02 PROCEDURE — 3700000001 HC ADD 15 MINUTES (ANESTHESIA): Performed by: SURGERY

## 2020-04-02 PROCEDURE — 6360000002 HC RX W HCPCS: Performed by: NURSE ANESTHETIST, CERTIFIED REGISTERED

## 2020-04-02 PROCEDURE — 2580000003 HC RX 258: Performed by: SURGERY

## 2020-04-02 PROCEDURE — 80053 COMPREHEN METABOLIC PANEL: CPT

## 2020-04-02 PROCEDURE — 3600000012 HC SURGERY LEVEL 2 ADDTL 15MIN: Performed by: SURGERY

## 2020-04-02 PROCEDURE — 6370000000 HC RX 637 (ALT 250 FOR IP): Performed by: SURGERY

## 2020-04-02 PROCEDURE — 6360000002 HC RX W HCPCS: Performed by: SURGERY

## 2020-04-02 PROCEDURE — 7100000000 HC PACU RECOVERY - FIRST 15 MIN: Performed by: SURGERY

## 2020-04-02 PROCEDURE — 2580000003 HC RX 258: Performed by: NURSE ANESTHETIST, CERTIFIED REGISTERED

## 2020-04-02 PROCEDURE — 6360000002 HC RX W HCPCS: Performed by: INTERNAL MEDICINE

## 2020-04-02 PROCEDURE — 84100 ASSAY OF PHOSPHORUS: CPT

## 2020-04-02 PROCEDURE — 88302 TISSUE EXAM BY PATHOLOGIST: CPT

## 2020-04-02 PROCEDURE — 85025 COMPLETE CBC W/AUTO DIFF WBC: CPT

## 2020-04-02 PROCEDURE — 2500000003 HC RX 250 WO HCPCS: Performed by: NURSE ANESTHETIST, CERTIFIED REGISTERED

## 2020-04-02 PROCEDURE — 49507 PRP I/HERN INIT BLOCK >5 YR: CPT | Performed by: SURGERY

## 2020-04-02 PROCEDURE — 2580000003 HC RX 258: Performed by: INTERNAL MEDICINE

## 2020-04-02 PROCEDURE — C9113 INJ PANTOPRAZOLE SODIUM, VIA: HCPCS | Performed by: SURGERY

## 2020-04-02 PROCEDURE — 83735 ASSAY OF MAGNESIUM: CPT

## 2020-04-02 PROCEDURE — 3700000000 HC ANESTHESIA ATTENDED CARE: Performed by: SURGERY

## 2020-04-02 PROCEDURE — 0YU60JZ SUPPLEMENT LEFT INGUINAL REGION WITH SYNTHETIC SUBSTITUTE, OPEN APPROACH: ICD-10-PCS | Performed by: SURGERY

## 2020-04-02 PROCEDURE — 93010 ELECTROCARDIOGRAM REPORT: CPT | Performed by: INTERNAL MEDICINE

## 2020-04-02 PROCEDURE — 3600000002 HC SURGERY LEVEL 2 BASE: Performed by: SURGERY

## 2020-04-02 PROCEDURE — 7100000001 HC PACU RECOVERY - ADDTL 15 MIN: Performed by: SURGERY

## 2020-04-02 PROCEDURE — 2709999900 HC NON-CHARGEABLE SUPPLY: Performed by: SURGERY

## 2020-04-02 PROCEDURE — C1781 MESH (IMPLANTABLE): HCPCS | Performed by: SURGERY

## 2020-04-02 DEVICE — MESH HERN W3XL6IN INGUINAL POLYPR MFIL RECTANG: Type: IMPLANTABLE DEVICE | Site: INGUINAL | Status: FUNCTIONAL

## 2020-04-02 RX ORDER — BUPIVACAINE HYDROCHLORIDE AND EPINEPHRINE 5; 5 MG/ML; UG/ML
INJECTION, SOLUTION EPIDURAL; INTRACAUDAL; PERINEURAL
Status: COMPLETED | OUTPATIENT
Start: 2020-04-02 | End: 2020-04-02

## 2020-04-02 RX ORDER — ACETAMINOPHEN 325 MG/1
650 TABLET ORAL EVERY 4 HOURS PRN
Status: DISCONTINUED | OUTPATIENT
Start: 2020-04-02 | End: 2020-04-04 | Stop reason: HOSPADM

## 2020-04-02 RX ORDER — PROMETHAZINE HYDROCHLORIDE 25 MG/1
12.5 TABLET ORAL EVERY 6 HOURS PRN
Status: DISCONTINUED | OUTPATIENT
Start: 2020-04-02 | End: 2020-04-03

## 2020-04-02 RX ORDER — ALENDRONATE SODIUM 70 MG/1
70 TABLET ORAL
COMMUNITY
End: 2021-09-29 | Stop reason: ALTCHOICE

## 2020-04-02 RX ORDER — DEXAMETHASONE SODIUM PHOSPHATE 4 MG/ML
INJECTION, SOLUTION INTRA-ARTICULAR; INTRALESIONAL; INTRAMUSCULAR; INTRAVENOUS; SOFT TISSUE PRN
Status: DISCONTINUED | OUTPATIENT
Start: 2020-04-02 | End: 2020-04-02 | Stop reason: SDUPTHER

## 2020-04-02 RX ORDER — SODIUM CHLORIDE 0.9 % (FLUSH) 0.9 %
10 SYRINGE (ML) INJECTION EVERY 12 HOURS SCHEDULED
Status: DISCONTINUED | OUTPATIENT
Start: 2020-04-02 | End: 2020-04-04 | Stop reason: HOSPADM

## 2020-04-02 RX ORDER — POTASSIUM CHLORIDE AND SODIUM CHLORIDE 900; 300 MG/100ML; MG/100ML
INJECTION, SOLUTION INTRAVENOUS CONTINUOUS
Status: DISCONTINUED | OUTPATIENT
Start: 2020-04-02 | End: 2020-04-03

## 2020-04-02 RX ORDER — HYDROMORPHONE HYDROCHLORIDE 1 MG/ML
0.25 INJECTION, SOLUTION INTRAMUSCULAR; INTRAVENOUS; SUBCUTANEOUS
Status: DISCONTINUED | OUTPATIENT
Start: 2020-04-02 | End: 2020-04-03

## 2020-04-02 RX ORDER — ACETAMINOPHEN 650 MG/1
650 SUPPOSITORY RECTAL EVERY 6 HOURS PRN
Status: DISCONTINUED | OUTPATIENT
Start: 2020-04-02 | End: 2020-04-04 | Stop reason: HOSPADM

## 2020-04-02 RX ORDER — OXYCODONE HYDROCHLORIDE 5 MG/1
5 TABLET ORAL PRN
Status: DISCONTINUED | OUTPATIENT
Start: 2020-04-02 | End: 2020-04-02 | Stop reason: HOSPADM

## 2020-04-02 RX ORDER — ONDANSETRON 2 MG/ML
4 INJECTION INTRAMUSCULAR; INTRAVENOUS EVERY 6 HOURS PRN
Status: DISCONTINUED | OUTPATIENT
Start: 2020-04-02 | End: 2020-04-03

## 2020-04-02 RX ORDER — EPHEDRINE SULFATE/0.9% NACL/PF 50 MG/5 ML
SYRINGE (ML) INTRAVENOUS PRN
Status: DISCONTINUED | OUTPATIENT
Start: 2020-04-02 | End: 2020-04-02 | Stop reason: SDUPTHER

## 2020-04-02 RX ORDER — POLYETHYLENE GLYCOL 3350 17 G/17G
17 POWDER, FOR SOLUTION ORAL DAILY PRN
Status: DISCONTINUED | OUTPATIENT
Start: 2020-04-02 | End: 2020-04-04 | Stop reason: HOSPADM

## 2020-04-02 RX ORDER — PHENYLEPHRINE HCL IN 0.9% NACL 1 MG/10 ML
SYRINGE (ML) INTRAVENOUS PRN
Status: DISCONTINUED | OUTPATIENT
Start: 2020-04-02 | End: 2020-04-02 | Stop reason: SDUPTHER

## 2020-04-02 RX ORDER — SUCCINYLCHOLINE/SOD CL,ISO/PF 200MG/10ML
SYRINGE (ML) INTRAVENOUS PRN
Status: DISCONTINUED | OUTPATIENT
Start: 2020-04-02 | End: 2020-04-02 | Stop reason: SDUPTHER

## 2020-04-02 RX ORDER — FENTANYL CITRATE 50 UG/ML
INJECTION, SOLUTION INTRAMUSCULAR; INTRAVENOUS PRN
Status: DISCONTINUED | OUTPATIENT
Start: 2020-04-02 | End: 2020-04-02 | Stop reason: SDUPTHER

## 2020-04-02 RX ORDER — MORPHINE SULFATE 2 MG/ML
1 INJECTION, SOLUTION INTRAMUSCULAR; INTRAVENOUS EVERY 4 HOURS PRN
Status: DISCONTINUED | OUTPATIENT
Start: 2020-04-02 | End: 2020-04-03

## 2020-04-02 RX ORDER — HYDROMORPHONE HCL 110MG/55ML
0.5 PATIENT CONTROLLED ANALGESIA SYRINGE INTRAVENOUS EVERY 5 MIN PRN
Status: DISCONTINUED | OUTPATIENT
Start: 2020-04-02 | End: 2020-04-02 | Stop reason: HOSPADM

## 2020-04-02 RX ORDER — ACETAMINOPHEN 325 MG/1
650 TABLET ORAL EVERY 6 HOURS PRN
Status: DISCONTINUED | OUTPATIENT
Start: 2020-04-02 | End: 2020-04-04 | Stop reason: HOSPADM

## 2020-04-02 RX ORDER — HYDROMORPHONE HCL 110MG/55ML
0.25 PATIENT CONTROLLED ANALGESIA SYRINGE INTRAVENOUS EVERY 5 MIN PRN
Status: DISCONTINUED | OUTPATIENT
Start: 2020-04-02 | End: 2020-04-02 | Stop reason: HOSPADM

## 2020-04-02 RX ORDER — LIDOCAINE HYDROCHLORIDE 20 MG/ML
INJECTION, SOLUTION EPIDURAL; INFILTRATION; INTRACAUDAL; PERINEURAL PRN
Status: DISCONTINUED | OUTPATIENT
Start: 2020-04-02 | End: 2020-04-02 | Stop reason: SDUPTHER

## 2020-04-02 RX ORDER — POTASSIUM CHLORIDE 7.45 MG/ML
10 INJECTION INTRAVENOUS
Status: DISPENSED | OUTPATIENT
Start: 2020-04-02 | End: 2020-04-02

## 2020-04-02 RX ORDER — METOPROLOL SUCCINATE 50 MG/1
50 TABLET, EXTENDED RELEASE ORAL 2 TIMES DAILY
Status: DISCONTINUED | OUTPATIENT
Start: 2020-04-03 | End: 2020-04-04 | Stop reason: HOSPADM

## 2020-04-02 RX ORDER — ONDANSETRON 2 MG/ML
INJECTION INTRAMUSCULAR; INTRAVENOUS PRN
Status: DISCONTINUED | OUTPATIENT
Start: 2020-04-02 | End: 2020-04-02 | Stop reason: SDUPTHER

## 2020-04-02 RX ORDER — DIPHENHYDRAMINE HYDROCHLORIDE 50 MG/ML
12.5 INJECTION INTRAMUSCULAR; INTRAVENOUS
Status: DISCONTINUED | OUTPATIENT
Start: 2020-04-02 | End: 2020-04-02 | Stop reason: HOSPADM

## 2020-04-02 RX ORDER — HEPARIN SODIUM 5000 [USP'U]/ML
5000 INJECTION, SOLUTION INTRAVENOUS; SUBCUTANEOUS EVERY 8 HOURS SCHEDULED
Status: DISCONTINUED | OUTPATIENT
Start: 2020-04-02 | End: 2020-04-04 | Stop reason: HOSPADM

## 2020-04-02 RX ORDER — AMLODIPINE BESYLATE 5 MG/1
5 TABLET ORAL DAILY
Status: DISCONTINUED | OUTPATIENT
Start: 2020-04-02 | End: 2020-04-04 | Stop reason: HOSPADM

## 2020-04-02 RX ORDER — MAGNESIUM HYDROXIDE 1200 MG/15ML
LIQUID ORAL CONTINUOUS PRN
Status: COMPLETED | OUTPATIENT
Start: 2020-04-02 | End: 2020-04-02

## 2020-04-02 RX ORDER — HYDROMORPHONE HYDROCHLORIDE 1 MG/ML
0.5 INJECTION, SOLUTION INTRAMUSCULAR; INTRAVENOUS; SUBCUTANEOUS
Status: DISCONTINUED | OUTPATIENT
Start: 2020-04-02 | End: 2020-04-03

## 2020-04-02 RX ORDER — LABETALOL HYDROCHLORIDE 5 MG/ML
5 INJECTION, SOLUTION INTRAVENOUS EVERY 10 MIN PRN
Status: DISCONTINUED | OUTPATIENT
Start: 2020-04-02 | End: 2020-04-02 | Stop reason: HOSPADM

## 2020-04-02 RX ORDER — SODIUM CHLORIDE 9 MG/ML
INJECTION, SOLUTION INTRAVENOUS CONTINUOUS PRN
Status: DISCONTINUED | OUTPATIENT
Start: 2020-04-02 | End: 2020-04-02 | Stop reason: SDUPTHER

## 2020-04-02 RX ORDER — FENTANYL CITRATE 50 UG/ML
50 INJECTION, SOLUTION INTRAMUSCULAR; INTRAVENOUS EVERY 5 MIN PRN
Status: DISCONTINUED | OUTPATIENT
Start: 2020-04-02 | End: 2020-04-02 | Stop reason: HOSPADM

## 2020-04-02 RX ORDER — PROPOFOL 10 MG/ML
INJECTION, EMULSION INTRAVENOUS PRN
Status: DISCONTINUED | OUTPATIENT
Start: 2020-04-02 | End: 2020-04-02 | Stop reason: SDUPTHER

## 2020-04-02 RX ORDER — PANTOPRAZOLE SODIUM 40 MG/10ML
40 INJECTION, POWDER, LYOPHILIZED, FOR SOLUTION INTRAVENOUS DAILY
Status: DISCONTINUED | OUTPATIENT
Start: 2020-04-02 | End: 2020-04-03

## 2020-04-02 RX ORDER — PROMETHAZINE HYDROCHLORIDE 25 MG/ML
6.25 INJECTION, SOLUTION INTRAMUSCULAR; INTRAVENOUS PRN
Status: DISCONTINUED | OUTPATIENT
Start: 2020-04-02 | End: 2020-04-02 | Stop reason: HOSPADM

## 2020-04-02 RX ORDER — MEPERIDINE HYDROCHLORIDE 25 MG/ML
12.5 INJECTION INTRAMUSCULAR; INTRAVENOUS; SUBCUTANEOUS EVERY 5 MIN PRN
Status: DISCONTINUED | OUTPATIENT
Start: 2020-04-02 | End: 2020-04-02 | Stop reason: HOSPADM

## 2020-04-02 RX ORDER — OXYCODONE HYDROCHLORIDE 5 MG/1
10 TABLET ORAL PRN
Status: DISCONTINUED | OUTPATIENT
Start: 2020-04-02 | End: 2020-04-02 | Stop reason: HOSPADM

## 2020-04-02 RX ORDER — SODIUM CHLORIDE 9 MG/ML
INJECTION, SOLUTION INTRAVENOUS CONTINUOUS
Status: DISCONTINUED | OUTPATIENT
Start: 2020-04-02 | End: 2020-04-02

## 2020-04-02 RX ORDER — SODIUM CHLORIDE 0.9 % (FLUSH) 0.9 %
10 SYRINGE (ML) INJECTION PRN
Status: DISCONTINUED | OUTPATIENT
Start: 2020-04-02 | End: 2020-04-04 | Stop reason: HOSPADM

## 2020-04-02 RX ADMIN — FENTANYL CITRATE 50 MCG: 50 INJECTION, SOLUTION INTRAMUSCULAR; INTRAVENOUS at 10:28

## 2020-04-02 RX ADMIN — ONDANSETRON 4 MG: 2 INJECTION INTRAMUSCULAR; INTRAVENOUS at 10:35

## 2020-04-02 RX ADMIN — SODIUM CHLORIDE: 9 INJECTION, SOLUTION INTRAVENOUS at 10:21

## 2020-04-02 RX ADMIN — ONDANSETRON 4 MG: 2 INJECTION INTRAMUSCULAR; INTRAVENOUS at 01:35

## 2020-04-02 RX ADMIN — Medication 100 MCG: at 10:36

## 2020-04-02 RX ADMIN — Medication 10 ML: at 13:16

## 2020-04-02 RX ADMIN — CEFAZOLIN 2 G: 10 INJECTION, POWDER, FOR SOLUTION INTRAVENOUS at 10:14

## 2020-04-02 RX ADMIN — POTASSIUM CHLORIDE AND SODIUM CHLORIDE: 900; 300 INJECTION, SOLUTION INTRAVENOUS at 13:16

## 2020-04-02 RX ADMIN — POTASSIUM CHLORIDE AND SODIUM CHLORIDE: 900; 300 INJECTION, SOLUTION INTRAVENOUS at 09:00

## 2020-04-02 RX ADMIN — Medication 100 MCG: at 10:33

## 2020-04-02 RX ADMIN — METRONIDAZOLE 500 MG: 500 INJECTION, SOLUTION INTRAVENOUS at 10:32

## 2020-04-02 RX ADMIN — HEPARIN SODIUM 5000 UNITS: 5000 INJECTION INTRAVENOUS; SUBCUTANEOUS at 21:11

## 2020-04-02 RX ADMIN — PANTOPRAZOLE SODIUM 40 MG: 40 INJECTION, POWDER, FOR SOLUTION INTRAVENOUS at 13:16

## 2020-04-02 RX ADMIN — FENTANYL CITRATE 25 MCG: 50 INJECTION, SOLUTION INTRAMUSCULAR; INTRAVENOUS at 11:01

## 2020-04-02 RX ADMIN — Medication 10 MG: at 10:43

## 2020-04-02 RX ADMIN — SODIUM CHLORIDE: 9 INJECTION, SOLUTION INTRAVENOUS at 01:39

## 2020-04-02 RX ADMIN — LIDOCAINE HYDROCHLORIDE 60 MG: 20 INJECTION, SOLUTION EPIDURAL; INFILTRATION; INTRACAUDAL; PERINEURAL at 10:29

## 2020-04-02 RX ADMIN — METOPROLOL TARTRATE 25 MG: 25 TABLET, FILM COATED ORAL at 10:10

## 2020-04-02 RX ADMIN — PROPOFOL 80 MG: 10 INJECTION, EMULSION INTRAVENOUS at 10:29

## 2020-04-02 RX ADMIN — DEXAMETHASONE SODIUM PHOSPHATE 4 MG: 4 INJECTION, SOLUTION INTRAMUSCULAR; INTRAVENOUS at 10:35

## 2020-04-02 RX ADMIN — Medication 80 MG: at 10:30

## 2020-04-02 ASSESSMENT — PULMONARY FUNCTION TESTS
PIF_VALUE: 14
PIF_VALUE: 1
PIF_VALUE: 14
PIF_VALUE: 0
PIF_VALUE: 14
PIF_VALUE: 7
PIF_VALUE: 14
PIF_VALUE: 1
PIF_VALUE: 1
PIF_VALUE: 14
PIF_VALUE: 14
PIF_VALUE: 13
PIF_VALUE: 14
PIF_VALUE: 14
PIF_VALUE: 1
PIF_VALUE: 14
PIF_VALUE: 4
PIF_VALUE: 14
PIF_VALUE: 1
PIF_VALUE: 14
PIF_VALUE: 15
PIF_VALUE: 3
PIF_VALUE: 14
PIF_VALUE: 14
PIF_VALUE: 0
PIF_VALUE: 14
PIF_VALUE: 1
PIF_VALUE: 12
PIF_VALUE: 14
PIF_VALUE: 14
PIF_VALUE: 25
PIF_VALUE: 14
PIF_VALUE: 14
PIF_VALUE: 13
PIF_VALUE: 13
PIF_VALUE: 14
PIF_VALUE: 12
PIF_VALUE: 14
PIF_VALUE: 13
PIF_VALUE: 14
PIF_VALUE: 1
PIF_VALUE: 14
PIF_VALUE: 14
PIF_VALUE: 26
PIF_VALUE: 14
PIF_VALUE: 14
PIF_VALUE: 15
PIF_VALUE: 14
PIF_VALUE: 0
PIF_VALUE: 14
PIF_VALUE: 15

## 2020-04-02 ASSESSMENT — PAIN SCALES - GENERAL: PAINLEVEL_OUTOF10: 0

## 2020-04-02 ASSESSMENT — PAIN - FUNCTIONAL ASSESSMENT: PAIN_FUNCTIONAL_ASSESSMENT: 0-10

## 2020-04-02 NOTE — PROGRESS NOTES
Med list reviewed and updated based off of last PCP visit on 3/18/2020, pt not taking aricept anymore due to side effects. List completed, will send message to hospitalist to reorder home meds. Moisés Kennedy [phone] with daughter Marisol Pleitez, patient does not have a living will or POA. Verbal consent obtained from daughter over telephone, however patient is alert and oriented x4 this AM and was able to sign consent. NG in place, verified by air bolus and gastric content. Bowel sounds absent. NG remains intact to continuous low wall suction. Lung sounds clear, morning meds held due to NPO for surgery. Will complete surgical pre-op checklist. Pt demonstrated how to reach nurse with call light. Call light in reach. Will continue to monitor. The care plan and education has been reviewed and mutually agreed upon with the patient.        [id number] spoke with daughter to come in as patient is going down to surgery

## 2020-04-02 NOTE — ED NOTES
Report given to Jackeline Cruz, [facility].       Noemi Sparks RN  04/02/20 6079

## 2020-04-02 NOTE — OP NOTE
[address]                     [facility], [facility]                                OPERATIVE REPORT    PATIENT NAME: Armida Lilly                       :        1933  MED REC NO:   [id number]                          ROOM:       [id number]  ACCOUNT NO:   [de-identified]                           ADMIT DATE: 2020  PROVIDER:     Camden Carrillo MD    DATE OF PROCEDURE:  2020    PREOPERATIVE DIAGNOSES:  Incarcerated left inguinal hernia with small  bowel obstruction. POSTOPERATIVE DIAGNOSES:  Incarcerated left inguinal hernia with small  bowel obstruction. OPERATION PERFORMED:  Open left inguinal hernia repair with mesh. SURGEON:  Reese Stover MD    ANESTHESIA:  General endotracheal and local.    ESTIMATED BLOOD LOSS:  Minimal.    COMPLICATIONS:  None. SPECIMEN:  None. OPERATIVE INDICATION AND CONSENT:  The patient is an 80-year-old female  who presented overnight with left groin pain and nausea and vomiting. CAT scan of the abdomen and pelvis showed findings consistent with an  incarcerated left inguinal hernia. She was brought to the operating  room today for open left inguinal hernia repair with mesh and possible  small bowel resection. She and her family were explained the risks,  benefits, possible complications including risk of hernia recurrence,  infection requiring mesh removal or nerve entrapment. DETAILS OF THE PROCEDURE:  The patient was brought to the operative  suite and placed in the supine position on the operative table. After  general endotracheal anesthesia, she was prepped and draped in the usual  sterile fashion. We made a 5-6 cm transverse incision in the left groin region. Dissection was carried down to the level of the external abdominal  oblique. The external abdominal oblique was then opened carefully along  line of its fibers to the external ring.   The hernia was then able to

## 2020-04-02 NOTE — PLAN OF CARE
Problem: Falls - Risk of:  Goal: Will remain free from falls  Description: Will remain free from falls  Outcome: Ongoing  Goal: Absence of physical injury  Description: Absence of physical injury  Outcome: Ongoing     Problem:  Bowel/Gastric:  Goal: Control of bowel function will improve  Description: Control of bowel function will improve  Outcome: Ongoing  Goal: Ability to achieve a regular elimination pattern will improve  Description: Ability to achieve a regular elimination pattern will improve  Outcome: Ongoing

## 2020-04-02 NOTE — ED PROVIDER NOTES
reviewed and negative. PAST MEDICAL HISTORY     Past Medical History:   Diagnosis Date    Chronic back pain     Hypertension          SURGICAL HISTORY     Past Surgical History:   Procedure Laterality Date    TOE SURGERY           CURRENTMEDICATIONS       Previous Medications    ALENDRONATE (FOSAMAX) 70 MG TABLET    TAKE ONE TABLET BY MOUTH ONCE A WEEK    AMLODIPINE (NORVASC) 10 MG TABLET    TAKE ONE TABLET BY MOUTH ONCE DAILY    ASPIRIN 81 MG TABLET    Take 81 mg by mouth daily    CLOTRIMAZOLE-BETAMETHASONE (LOTRISONE) 1-0.05 % CREAM    Apply topically 2 times daily and cancel script for triamcinolone 0.1 % cream    HYDROCHLOROTHIAZIDE (HYDRODIURIL) 25 MG TABLET    TAKE 1 TABLET BY MOUTH ONCE DAILY    LOSARTAN (COZAAR) 100 MG TABLET    TAKE ONE TABLET BY MOUTH ONCE DAILY    METOPROLOL SUCCINATE (TOPROL XL) 50 MG EXTENDED RELEASE TABLET    TAKE 1 TABLET BY MOUTH TWICE DAILY    MULTIPLE VITAMIN (MULTIVITAMINS PO)    Take by mouth    ONDANSETRON (ZOFRAN) 4 MG TABLET    Take 1 tablet by mouth every 8 hours as needed for Nausea or Vomiting    VITAMIN D (CHOLECALCIFEROL) 1000 UNIT TABS TABLET    Take 1,000 Units by mouth daily         ALLERGIES     Patient has no known allergies. FAMILYHISTORY       Family History   Problem Relation Age of Onset    Diabetes Mother     Diabetes Sister           SOCIAL HISTORY       Social History     Tobacco Use    Smoking status: Never Smoker    Smokeless tobacco: Never Used   Substance Use Topics    Alcohol use: No    Drug use: No       SCREENINGS             PHYSICAL EXAM    (up to 7 for level 4, 8 or more for level 5)     ED Triage Vitals   BP Temp Temp Source Pulse Resp SpO2 Height Weight   04/01/20 2044 04/01/20 2048 04/01/20 2048 04/01/20 2048 04/01/20 2048 04/01/20 2048 04/01/20 2048 04/01/20 2048   139/64 97.9 °F (36.6 °C) Oral 84 18 97 % 5' 6\" (1.676 m) 132 lb (59.9 kg)       Physical Exam  Vitals signs and nursing note reviewed.    Constitutional: surgery who would like patient to have NG tube and will see her in the morning. Patient was admitted to hospital service for further work-up and treatment. Patient declining any shortness of breath here or any other symptoms. Do not believe any further work-up or testing is warranted this time. Patient was stable time of admission. FINAL IMPRESSION      1. Small bowel obstruction ([address]    2. Inguinal hernia with obstruction without gangrene, recurrence not specified, unspecified laterality    3. Acute renal failure, unspecified acute renal failure type ([address]    4. Nausea and vomiting, intractability of vomiting not specified, unspecified vomiting type          DISPOSITION/PLAN   DISPOSITION Decision To Admit 04/01/2020 11:35:07 PM      PATIENT REFERREDTO:  No follow-up provider specified.     DISCHARGE MEDICATIONS:  New Prescriptions    No medications on file       DISCONTINUED MEDICATIONS:  Discontinued Medications    No medications on file              (Please note that portions of this note were completed with a voice recognition program.  Efforts were made to edit the dictations but occasionally words are mis-transcribed.)    Oleksandr Willis PA-C (electronically signed)           Oleksandr Willis PA-C  04/02/20 0003

## 2020-04-02 NOTE — ANESTHESIA POSTPROCEDURE EVALUATION
Department of Anesthesiology  Postprocedure Note    Patient: Candace Kenney  MRN: [id number]  YOB: 1933  Date of evaluation: 4/2/2020  Time:  11:49 AM     Procedure Summary     Date:  04/02/20 Room / Location:  [facility] OR [address] [place] [address]    Anesthesia Start:  1021 Anesthesia Stop:  3186    Procedure:  REPAIR OF INCARCERATED LEFT INGUINAL HERNIA WITH  MESH (Left Abdomen) Diagnosis:  (Incarcerated  Left Inguinal Hernia)    Surgeon:  Josiah Farmer MD Responsible Provider:  Rosales Mcdonald MD    Anesthesia Type:  general ASA Status:  3          Anesthesia Type: general    Melly Phase I: Melly Score: 10    Melly Phase II:      Last vitals: Reviewed and per EMR flowsheets.        Anesthesia Post Evaluation    Level of consciousness: awake  Complications: no

## 2020-04-02 NOTE — H&P
Pina Camacho     CC-LLQ pain     HPI: 80year old female admitted with LLQ pain. The patient is a poor historian. History was obtained per the chart. Her presenting symptom was shortness of breath. Upon further questioning, she reported about 3-month history of intermittent left groin pain and according to her daughter she developed nausea and vomiting overnight. CAT scan the abdomen pelvis showed a small bowel obstruction secondary to incarcerated hernia in the left groin region. No history of fevers, chills or urinary symptoms. Past Medical History:   Diagnosis Date    Chronic back pain     CKD (chronic kidney disease) stage 3, GFR 30-59 ml/min ([facility])     Hypertension        Past Surgical History:   Procedure Laterality Date    HYSTERECTOMY, TOTAL ABDOMINAL      TOE SURGERY         Social History     Socioeconomic History    Marital status:       Spouse name: Not on file    Number of children: Not on file    Years of education: Not on file    Highest education level: Not on file   Occupational History    Occupation: Retired   Social Needs    Financial resource strain: Not on file    Food insecurity     Worry: Not on file     Inability: Not on file   [employer] needs     Medical: Not on file     Non-medical: Not on file   Tobacco Use    Smoking status: Never Smoker    Smokeless tobacco: Never Used   Substance and Sexual Activity    Alcohol use: No    Drug use: No    Sexual activity: Never   Lifestyle    Physical activity     Days per week: Not on file     Minutes per session: Not on file    Stress: Not on file   Relationships    Social connections     Talks on phone: Not on file     Gets together: Not on file     Attends Faith service: Not on file     Active member of club or organization: Not on file     Attends meetings of clubs or organizations: Not on file     Relationship status: Not on file    Intimate partner violence     Fear of current or ex partner: Not on file Emotionally abused: Not on file     Physically abused: Not on file     Forced sexual activity: Not on file   Other Topics Concern    Not on file   Social History Narrative    Not on file       Allergies: No Known Allergies    Prior to Admission medications    Medication Sig Start Date End Date Taking? Authorizing Provider   alendronate (FOSAMAX) 70 MG tablet Take 70 mg by mouth every 7 days Takes on saturdays   Yes Historical Provider, MD   ondansetron (ZOFRAN) 4 MG tablet Take 1 tablet by mouth every 8 hours as needed for Nausea or Vomiting 3/14/20  Yes Dixon Dupont MD   amLODIPine (NORVASC) 10 MG tablet TAKE ONE TABLET BY MOUTH ONCE DAILY 3/6/20  Yes Bianca Armstrong MD   hydroCHLOROthiazide (HYDRODIURIL) 25 MG tablet TAKE 1 TABLET BY MOUTH ONCE DAILY 3/6/20  Yes Bianca Armstrong MD   metoprolol succinate (TOPROL XL) 50 MG extended release tablet TAKE 1 TABLET BY MOUTH TWICE DAILY 11/15/19  Yes Bianca Armstrong MD   losartan (COZAAR) 100 MG tablet TAKE ONE TABLET BY MOUTH ONCE DAILY 9/13/19  Yes Bianca Armstrong MD   clotrimazole-betamethasone (Sena Prow) 1-0.05 % cream Apply topically 2 times daily and cancel script for triamcinolone 0.1 % cream 5/28/19  Yes Bianca Armstrong MD   vitamin D (CHOLECALCIFEROL) 1000 UNIT TABS tablet Take 1,000 Units by mouth daily   Yes Historical Provider, MD   aspirin 81 MG tablet Take 81 mg by mouth daily   Yes Historical Provider, MD   Multiple Vitamin (MULTIVITAMINS PO) Take by mouth   Yes Historical Provider, MD       Principal Problem:    Small bowel obstruction ([facility]  Active Problems:    Essential hypertension    Late onset Alzheimer's disease with behavioral disturbance ([facility]    Left lower quadrant abdominal pain    Acute kidney injury superimposed on chronic kidney disease (HCC)    Hypercalcemia    Dehydration    Hypokalemia    CKD (chronic kidney disease) stage 3, GFR 30-59 ml/min ([facility])  Resolved Problems:    * No resolved hospital problems.  *      Blood pressure 133/67, pulse 81, temperature 97.1 °F (36.2 °C), temperature source Temporal, resp. rate 20, height 5' 6\" (1.676 m), weight 127 lb (57.6 kg), SpO2 98 %, not currently breastfeeding. Review of Systems   Unable to perform ROS: Mental status change       Physical Exam  Constitutional:       Appearance: She is well-developed. HENT:      Head: Normocephalic and atraumatic. Right Ear: External ear normal.      Left Ear: External ear normal.   Eyes:      Conjunctiva/sclera: Conjunctivae normal.   Neck:      Musculoskeletal: Normal range of motion and neck supple. Cardiovascular:      Rate and Rhythm: Normal rate and regular rhythm. Pulmonary:      Effort: Pulmonary effort is normal.      Breath sounds: Normal breath sounds. Abdominal:      General: There is distension. Palpations: Abdomen is soft. Comments: Tender bulge in the LLQ   Musculoskeletal: Normal range of motion. Skin:     General: Skin is warm and dry. Neurological:      Mental Status: She is alert and oriented to person, place, and time. Psychiatric:         Behavior: Behavior normal.         Assessment:  80year old female who presented to the hospital with left groin pain associated with nausea and vomiting. Physical examination reveals a firm mass in the left groin region. CAT scan of the abdomen pelvis was reviewed. It shows a small bowel obstruction secondary to hernia in the left groin region. A nasogastric tube was placed overnight. Plan: We will plan for left inguinal hernia repair with possible SBR, possible mesh. Patient explained risks, benefits and complications of hernia repair including hernia recurrence, infection requiring mesh removal, bowel injury or erosion of mesh into bowel and nerve entrapment.        Shayne Fregoso MD  4/2/2020

## 2020-04-02 NOTE — PROGRESS NOTES
Pt back to room in stable condition. NG remains in place. New dunne. Pt resting comfortably. Incision to abdomen clean dry and intact.      1813 Hourly rounding performed on pt:  Pain: controlled, pt in bed with eyes closed and RR>10  Position: appears in no distress  Restroom: urinal in place  Proximity: call light, phone, bedside table in reach  Will continue to monitor

## 2020-04-02 NOTE — PLAN OF CARE
Problem: Falls - Risk of:  Goal: Will remain free from falls  Description: Will remain free from falls  4/2/2020 1927 by Alexus Scott RN  Outcome: Ongoing  4/2/2020 0755 by Namrata Asencio RN  Outcome: Ongoing  4/2/2020 0644 by Elma Douglas RN  Outcome: Ongoing  Goal: Absence of physical injury  Description: Absence of physical injury  4/2/2020 1927 by Alexus Scott RN  Outcome: Ongoing  4/2/2020 0755 by Namrata Asencio RN  Outcome: Ongoing  4/2/2020 0644 by Elma Douglas RN  Outcome: Ongoing     Problem:  Bowel/Gastric:  Goal: Control of bowel function will improve  Description: Control of bowel function will improve  4/2/2020 1927 by Alexus Scott RN  Outcome: Ongoing  4/2/2020 0755 by Namrata Asencio RN  Outcome: Ongoing  4/2/2020 0644 by Elma Douglas RN  Outcome: Ongoing  Goal: Ability to achieve a regular elimination pattern will improve  Description: Ability to achieve a regular elimination pattern will improve  4/2/2020 1927 by Alexus Scott RN  Outcome: Ongoing  4/2/2020 0755 by Namrata Asencio RN  Outcome: Ongoing  4/2/2020 0644 by Elma Douglas RN  Outcome: Ongoing

## 2020-04-02 NOTE — H&P
(CHOLECALCIFEROL) 1000 UNIT TABS tablet Take 1,000 Units by mouth daily   Yes Historical Provider, MD   aspirin 81 MG tablet Take 81 mg by mouth daily   Yes Historical Provider, MD   Multiple Vitamin (MULTIVITAMINS PO) Take by mouth   Yes Historical Provider, MD       Allergy(ies):  Patient has no known allergies. Social History:  TOBACCO:  reports that she has never smoked. She has never used smokeless tobacco.  ETOH:  reports no history of alcohol use. Family History:      Problem Relation Age of Onset    Diabetes Mother     Diabetes Sister        Review of Systems:  Pertinent positives are listed in HPI. At least 10-point ROS reviewed and were negative. Vitals and physical examination:  /67   Pulse 80   Temp 97.9 °F (36.6 °C) (Oral)   Resp 16   Ht 5' 6\" (1.676 m)   Wt 132 lb (59.9 kg)   SpO2 100%   BMI 21.31 kg/m²   Gen/overall appearance: Not in acute distress. Alert. Oriented x3; however, demonstrates intermittent episodes of confusion during evaluation. Head: Normocephalic, atraumatic  Eyes: EOMI, good acuity  ENT: Oral mucosa moist  Neck: No JVD, thyromegaly  CVS: Nml S1S2, no MRG, RRR  Pulm: Clear bilaterally. No crackles/wheezes  Gastrointestinal: Mild tenderness to deep palpation in the left lower quadrant. Old vertical infraumbilical surgical scar present. Soft, ND, +BS  Musculoskeletal: No edema. Warm  Neuro: No focal deficit. Moves extremity spontaneously. Psychiatry: Appropriate affect. Not agitated. Skin: Warm, dry with normal turgor.  No rash  Capillary refill: Brisk,< 3 seconds   Peripheral Pulses: +2 palpable, equal bilaterally       Labs/imaging/EKG:  CBC:   Recent Labs     04/01/20 2118   WBC 9.4   HGB 14.5        BMP:    Recent Labs     04/01/20 2119      K 3.4*   CL 95*   CO2 28   BUN 29*   CREATININE 1.3*   GLUCOSE 143*     Hepatic:   Recent Labs     04/01/20 2119   AST 26   ALT 16   BILITOT 0.6   ALKPHOS 66       Xr Chest Standard (2 Vw)    Result Date: 4/1/2020  EXAMINATION: TWO XRAY VIEWS OF THE CHEST 4/1/2020 9:10 pm COMPARISON: 03/14/2020 HISTORY: ORDERING SYSTEM PROVIDED HISTORY: shortness of breath TECHNOLOGIST PROVIDED HISTORY: Reason for exam:->shortness of breath Reason for Exam: SOB, cough Acuity: Acute Type of Exam: Unknown FINDINGS: Mediastinal silhouette appears stable. Lungs appear hyperaerated suggestive of COPD. Calcified granuloma right lower lung field unchanged. Calcifications are noted about the thoracic aorta. No acute cardiopulmonary abnormality identified. Xr Chest Standard (2 Vw)    Result Date: 3/14/2020  EXAMINATION: TWO XRAY VIEWS OF THE CHEST 3/14/2020 2:23 pm COMPARISON: 09/22/2016 HISTORY: ORDERING SYSTEM PROVIDED HISTORY: cough TECHNOLOGIST PROVIDED HISTORY: Reason for exam:->cough Reason for Exam: cannot swallow food anymore FINDINGS: The lungs are without acute focal process. There is no effusion or pneumothorax. The cardiomediastinal silhouette is stable. The osseous structures are stable. No acute process. Ct Abdomen Pelvis W Iv Contrast Additional Contrast? None    Result Date: 4/1/2020  EXAMINATION: CT OF THE ABDOMEN AND PELVIS WITH CONTRAST 4/1/2020 7:45 pm TECHNIQUE: CT of the abdomen and pelvis was performed with the administration of intravenous contrast. Multiplanar reformatted images are provided for review. Dose modulation, iterative reconstruction, and/or weight based adjustment of the mA/kV was utilized to reduce the radiation dose to as low as reasonably achievable. COMPARISON: None. HISTORY: ORDERING SYSTEM PROVIDED HISTORY: abdominal pain TECHNOLOGIST PROVIDED HISTORY: If patient is on cardiac monitor and/or pulse ox, they may be taken off cardiac monitor and pulse ox, left on O2 if currently on. All monitors reattached when patient returns to room.  Additional Contrast?->None Reason for exam:->abdominal pain Reason for Exam: Shortness of Breath (Patient arrived via [facility] squad for c/o SOB that

## 2020-04-02 NOTE — CARE COORDINATION
Discharge Planning Assessment    RN/SW discharge planner met with patient/ (and family member) to discuss reason for admission, current living situation, and potential needs at the time of discharge    Demographics/Insurance verified Yes Medicare    Current type of dwelling: senior apartment [facility]     Patient from ECF/SW confirmed with: lives alone in apartment    Living arrangements: lives alone, up until the past few weeks was independent with all ADL    Level of function/Support: was staying with daughter Melissa Enriquez x 2 weeks prior to admission, patient able to perform her basic ADL without reinforcement but is not safe to cook or clean. Patient is not driving    PCP: Dr Matty Hsu    Last Visit to PCP: March 10    DME: in apartment has grab bar in shower and a fall alert, has  Quad cane    Active with any community resources/agencies/skilled home care: no professional or unprofessional care. Attended [facility] 3x/ week    Medication compliance issues: none reported, uses [facility] on [address] issues that could impact healthcare: none note      Tentative discharge plan: to daughter Kinga's home    Discussed and provided facilities of choice if transition to a skilled nursing facility is required at the time of discharge  CM will e-mail Medicare list with star ratings to daughter at [email or web address]    Discussed with patient and/or family that on the day of discharge home tentative time of discharge will be between 10 AM and noon. Transportation at the time of discharge: pending discharge plan, if home daughter to transport.  If to facility CM to arrange    NELI TristanN, CCM, RN  [facility]  [phone]

## 2020-04-02 NOTE — BRIEF OP NOTE
Brief Postoperative Note      Patient: Amaury Santoyo  YOB: 1933  MRN: [id number]    Date of Procedure: 4/2/2020    Pre-Op Diagnosis: Incarcerated  Left Inguinal Hernia with SBO    Post-Op Diagnosis: Same       Procedure(s):  REPAIR OF INCARCERATED LEFT INGUINAL HERNIA WITH  MESH    Surgeon(s):  Benji Felix MD    Assistant:  Surgical Assistant: Jonatan Duke    Anesthesia: General    Estimated Blood Loss (mL): Minimal    Complications: None    Specimens:   ID Type Source Tests Collected by Time Destination   A : A) LEFT INGUINAL HERNIA [facility] Tissue Tissue SURGICAL PATHOLOGY Benji Felix MD 4/2/2020 1059        Implants:  Implant Name Type Inv. Item Serial No.  Lot No. LRB No. Used Action   GRAFT 2020 [facility] SURG DAISY GROIN SHT ST 3X6IN Mesh GRAFT 2020 [facility] SURG DAISY GROIN SHT ST 3X6IN  CR [employer] [id number] Left 1 Implanted         Drains:   NG/OG/NJ/NE Tube Nasogastric 16 fr Right nostril (Active)   Surrounding Skin Dry; Intact 4/2/2020  9:03 AM   Securement device Yes 4/2/2020  9:03 AM   Status Suction-low continuous 4/2/2020  9:03 AM   Placement Verified by Gastric Contents 4/2/2020  9:03 AM   NG/OG/NJ/NE External Measurement (cm) 55 cm 4/2/2020  9:03 AM   Drainage Appearance Clear;Yellow 4/2/2020  9:03 AM   Free Water Flush (mL) 30 mL 4/2/2020  9:03 AM   Output (mL) 100 ml 4/2/2020  6:31 AM       Urethral Catheter Non-latex;Straight-tip 16 fr (Active)       Findings: incarcerated L inguinal hernia containing viable small bowel    Electronically signed by Benji Felix MD on 4/2/2020 at 11:19 AM

## 2020-04-02 NOTE — ANESTHESIA PRE PROCEDURE
 sodium chloride flush 0.9 % injection 10 mL  10 mL Intravenous 2 times per day Radha Mooney MD        sodium chloride flush 0.9 % injection 10 mL  10 mL Intravenous PRN Radha Mooney MD        acetaminophen (TYLENOL) tablet 650 mg  650 mg Oral Q6H PRN Radha Mooney MD        Or    acetaminophen (TYLENOL) suppository 650 mg  650 mg Rectal Q6H PRN Radha Mooney MD        promethazine (PHENERGAN) tablet 12.5 mg  12.5 mg Oral Q6H PRN Radha Mooney MD        Or    ondansetron (ZOFRAN) injection 4 mg  4 mg Intravenous Q6H PRN Radha Mooney MD   4 mg at 04/02/20 0135    polyethylene glycol (GLYCOLAX) packet 17 g  17 g Oral Daily PRN Radha Mooney MD        heparin (porcine) injection 5,000 Units  5,000 Units Subcutaneous 3 times per day Radha Mooney MD        acetaminophen (TYLENOL) tablet 650 mg  650 mg Oral Q4H PRN Radha Mooney MD        morphine (PF) injection 1 mg  1 mg Intravenous Q4H PRN Radha Mooney MD        0.9% NaCl with KCl 40 mEq infusion   Intravenous Continuous Radha Mooney MD 75 mL/hr at 04/02/20 0900      HYDROmorphone (DILAUDID) injection 0.25 mg  0.25 mg Intravenous Q5 Min PRN Petar Mcmahan MD        fentaNYL (SUBLIMAZE) injection 50 mcg  50 mcg Intravenous Q5 Min PRN Petar Mcmahan MD        HYDROmorphone (DILAUDID) injection 0.25 mg  0.25 mg Intravenous Q5 Min PRN Petar Mcmahan MD        HYDROmorphone (DILAUDID) injection 0.5 mg  0.5 mg Intravenous Q5 Min PRN Petar Mcmahan MD        oxyCODONE (ROXICODONE) immediate release tablet 5 mg  5 mg Oral PRN Petar Mcmahan MD        Or    oxyCODONE (ROXICODONE) immediate release tablet 10 mg  10 mg Oral PRN Petar Mcmahan MD        diphenhydrAMINE (BENADRYL) injection 12.5 mg  12.5 mg Intravenous Once PRN Petar Mcmahan MD        promethazine (PHENERGAN) injection 6.25 mg  6.25 mg Intravenous PRN Petar Mcmahan MD        labetalol (NORMODYNE;TRANDATE) injection 5 mg  5 mg Intravenous Q10 Min PRN Kelby Benson MD        meperidine (DEMEROL) injection 12.5 mg  12.5 mg Intravenous Q5 Min PRN Kelby Benson MD           Allergies:  No Known Allergies    Problem List:    Patient Active Problem List   Diagnosis Code    Essential hypertension I10    Chronic right shoulder pain M25.511, G89.29    Acute pain of right shoulder M25.511    Late onset Alzheimer's disease with behavioral disturbance (HCC) G30.1, F02.81    Small bowel obstruction ([facility] [address] K56.609    Left lower quadrant abdominal pain R10.32    Acute kidney injury superimposed on chronic kidney disease ([facility] [address] N17.9, N18.9    Hypercalcemia E83.52    Dehydration E86.0    Hypokalemia E87.6    CKD (chronic kidney disease) stage 3, GFR 30-59 ml/min (HCC) N18.3       Past Medical History:        Diagnosis Date    Chronic back pain     CKD (chronic kidney disease) stage 3, GFR 30-59 ml/min (HCC)     Hypertension        Past Surgical History:        Procedure Laterality Date    HYSTERECTOMY, TOTAL ABDOMINAL      TOE SURGERY         Social History:    Social History     Tobacco Use    Smoking status: Never Smoker    Smokeless tobacco: Never Used   Substance Use Topics    Alcohol use:  No                                Counseling given: Not Answered      Vital Signs (Current):   Vitals:    04/02/20 0109 04/02/20 0512 04/02/20 0901 04/02/20 0948   BP: (!) 156/76 115/63 (!) 147/77 133/67   Pulse: 77 89 84 81   Resp: 16 16 18 20   Temp: 98.5 °F (36.9 °C) 99.2 °F (37.3 °C) 98.2 °F (36.8 °C) 97.1 °F (36.2 °C)   TempSrc: Oral Oral Oral Temporal   SpO2: 99% 100% 97% 98%   Weight: 127 lb 8 oz (57.8 kg) 127 lb 8 oz (57.8 kg)  127 lb (57.6 kg)   Height:    5' 6\" (1.676 m)                                              BP Readings from Last 3 Encounters:   04/02/20 133/67   03/18/20 118/61   03/14/20 133/74       NPO Status: Time of last liquid consumption: 2359                        Time of last solid consumption: 2359 Date of last liquid consumption: 04/01/20                        Date of last solid food consumption: 04/01/20    BMI:   Wt Readings from Last 3 Encounters:   04/02/20 127 lb (57.6 kg)   03/18/20 130 lb (59 kg)   03/14/20 131 lb 9.8 oz (59.7 kg)     Body mass index is 20.5 kg/m². CBC:   Lab Results   Component Value Date    WBC 8.0 04/02/2020    RBC 4.87 04/02/2020    HGB 13.6 04/02/2020    HCT 40.4 04/02/2020    MCV 82.9 04/02/2020    RDW 14.8 04/02/2020     04/02/2020       CMP:   Lab Results   Component Value Date     04/02/2020    K 3.5 04/02/2020    K 3.8 03/14/2020    CL 99 04/02/2020    CO2 25 04/02/2020    BUN 29 04/02/2020    CREATININE 1.0 04/02/2020    GFRAA >60 04/02/2020    AGRATIO 1.0 04/02/2020    LABGLOM 52 04/02/2020    GLUCOSE 131 04/02/2020    PROT 7.6 04/02/2020    CALCIUM 10.2 04/02/2020    BILITOT 0.5 04/02/2020    ALKPHOS 60 04/02/2020    AST 23 04/02/2020    ALT 13 04/02/2020       POC Tests: No results for input(s): POCGLU, POCNA, POCK, POCCL, POCBUN, POCHEMO, POCHCT in the last 72 hours. Coags: No results found for: PROTIME, INR, APTT    HCG (If Applicable): No results found for: PREGTESTUR, PREGSERUM, HCG, HCGQUANT     ABGs: No results found for: PHART, PO2ART, GFS6YNC, SAY5GDU, BEART, I4BRTMYX     Type & Screen (If Applicable):  No results found for: LABABO, LABRH    Anesthesia Evaluation    Airway: Mallampati: II  TM distance: >3 FB   Neck ROM: full  Mouth opening: > = 3 FB Dental:          Pulmonary:                              Cardiovascular:    (+) hypertension:,         Rhythm: regular  Rate: normal                    Neuro/Psych:   (+) psychiatric history:            GI/Hepatic/Renal:             Endo/Other:                     Abdominal:           Vascular:                                        Anesthesia Plan      general     ASA 3       Induction: intravenous. Anesthetic plan and risks discussed with patient.       Plan discussed with

## 2020-04-03 ENCOUNTER — PATIENT MESSAGE (OUTPATIENT)
Dept: PRIMARY CARE CLINIC | Age: 85
End: 2020-04-03

## 2020-04-03 LAB
A/G RATIO: 0.9 (ref 1.1–2.2)
ALBUMIN SERPL-MCNC: 3.2 G/DL (ref 3.4–5)
ALP BLD-CCNC: 50 U/L (ref 40–129)
ALT SERPL-CCNC: 9 U/L (ref 10–40)
ANION GAP SERPL CALCULATED.3IONS-SCNC: 11 MMOL/L (ref 3–16)
AST SERPL-CCNC: 22 U/L (ref 15–37)
BASOPHILS ABSOLUTE: 0 K/UL (ref 0–0.2)
BASOPHILS RELATIVE PERCENT: 0.2 %
BILIRUB SERPL-MCNC: 0.5 MG/DL (ref 0–1)
BUN BLDV-MCNC: 24 MG/DL (ref 7–20)
CALCIUM SERPL-MCNC: 9.1 MG/DL (ref 8.3–10.6)
CHLORIDE BLD-SCNC: 108 MMOL/L (ref 99–110)
CO2: 23 MMOL/L (ref 21–32)
CREAT SERPL-MCNC: 0.9 MG/DL (ref 0.6–1.2)
EOSINOPHILS ABSOLUTE: 0 K/UL (ref 0–0.6)
EOSINOPHILS RELATIVE PERCENT: 0.1 %
GFR AFRICAN AMERICAN: >60
GFR NON-AFRICAN AMERICAN: 59
GLOBULIN: 3.4 G/DL
GLUCOSE BLD-MCNC: 102 MG/DL (ref 70–99)
HCT VFR BLD CALC: 36.9 % (ref 36–48)
HEMOGLOBIN: 12.1 G/DL (ref 12–16)
LYMPHOCYTES ABSOLUTE: 0.9 K/UL (ref 1–5.1)
LYMPHOCYTES RELATIVE PERCENT: 13.6 %
MCH RBC QN AUTO: 27.7 PG (ref 26–34)
MCHC RBC AUTO-ENTMCNC: 32.8 G/DL (ref 31–36)
MCV RBC AUTO: 84.4 FL (ref 80–100)
MONOCYTES ABSOLUTE: 0.9 K/UL (ref 0–1.3)
MONOCYTES RELATIVE PERCENT: 14.7 %
NEUTROPHILS ABSOLUTE: 4.6 K/UL (ref 1.7–7.7)
NEUTROPHILS RELATIVE PERCENT: 71.4 %
PDW BLD-RTO: 14.7 % (ref 12.4–15.4)
PLATELET # BLD: 208 K/UL (ref 135–450)
PMV BLD AUTO: 7.8 FL (ref 5–10.5)
POTASSIUM REFLEX MAGNESIUM: 4 MMOL/L (ref 3.5–5.1)
RBC # BLD: 4.38 M/UL (ref 4–5.2)
SODIUM BLD-SCNC: 142 MMOL/L (ref 136–145)
TOTAL PROTEIN: 6.6 G/DL (ref 6.4–8.2)
WBC # BLD: 6.4 K/UL (ref 4–11)

## 2020-04-03 PROCEDURE — 80053 COMPREHEN METABOLIC PANEL: CPT

## 2020-04-03 PROCEDURE — 85025 COMPLETE CBC W/AUTO DIFF WBC: CPT

## 2020-04-03 PROCEDURE — 6370000000 HC RX 637 (ALT 250 FOR IP): Performed by: SURGERY

## 2020-04-03 PROCEDURE — 6360000002 HC RX W HCPCS: Performed by: SURGERY

## 2020-04-03 PROCEDURE — C9113 INJ PANTOPRAZOLE SODIUM, VIA: HCPCS | Performed by: SURGERY

## 2020-04-03 PROCEDURE — 94760 N-INVAS EAR/PLS OXIMETRY 1: CPT

## 2020-04-03 PROCEDURE — 1200000000 HC SEMI PRIVATE

## 2020-04-03 PROCEDURE — 99024 POSTOP FOLLOW-UP VISIT: CPT | Performed by: SURGERY

## 2020-04-03 PROCEDURE — 2580000003 HC RX 258: Performed by: SURGERY

## 2020-04-03 RX ORDER — ONDANSETRON 8 MG/1
8 TABLET, ORALLY DISINTEGRATING ORAL EVERY 8 HOURS PRN
Status: DISCONTINUED | OUTPATIENT
Start: 2020-04-03 | End: 2020-04-04 | Stop reason: HOSPADM

## 2020-04-03 RX ORDER — HALOPERIDOL 5 MG/ML
0.5 INJECTION INTRAMUSCULAR EVERY 30 MIN PRN
Status: ACTIVE | OUTPATIENT
Start: 2020-04-03 | End: 2020-04-04

## 2020-04-03 RX ORDER — PANTOPRAZOLE SODIUM 40 MG/1
40 TABLET, DELAYED RELEASE ORAL
Status: DISCONTINUED | OUTPATIENT
Start: 2020-04-04 | End: 2020-04-04 | Stop reason: HOSPADM

## 2020-04-03 RX ADMIN — HEPARIN SODIUM 5000 UNITS: 5000 INJECTION INTRAVENOUS; SUBCUTANEOUS at 05:39

## 2020-04-03 RX ADMIN — Medication 10 ML: at 22:55

## 2020-04-03 RX ADMIN — METOPROLOL SUCCINATE 50 MG: 50 TABLET, EXTENDED RELEASE ORAL at 07:40

## 2020-04-03 RX ADMIN — PANTOPRAZOLE SODIUM 40 MG: 40 INJECTION, POWDER, FOR SOLUTION INTRAVENOUS at 07:40

## 2020-04-03 RX ADMIN — AMLODIPINE BESYLATE 5 MG: 5 TABLET ORAL at 07:40

## 2020-04-03 RX ADMIN — POTASSIUM CHLORIDE AND SODIUM CHLORIDE: 900; 300 INJECTION, SOLUTION INTRAVENOUS at 01:50

## 2020-04-03 RX ADMIN — HEPARIN SODIUM 5000 UNITS: 5000 INJECTION INTRAVENOUS; SUBCUTANEOUS at 12:51

## 2020-04-03 RX ADMIN — Medication 10 ML: at 07:41

## 2020-04-03 RX ADMIN — METOPROLOL SUCCINATE 50 MG: 50 TABLET, EXTENDED RELEASE ORAL at 21:57

## 2020-04-03 RX ADMIN — HEPARIN SODIUM 5000 UNITS: 5000 INJECTION INTRAVENOUS; SUBCUTANEOUS at 21:58

## 2020-04-03 NOTE — PLAN OF CARE
Nutrition Problem: Inadequate oral intake  Intervention: Food and/or Nutrient Delivery: Continue current diet  Nutritional Goals: po intake greater than 50% of meals with good tolerance as diet advances to solids

## 2020-04-03 NOTE — PROGRESS NOTES
Visited w/patient while rounding on unit per [address] list.  Unable to complete conversation regarding advance care planning. Patient interactive but, seemed pleasantly confused. Spiritual support provided through active listening, blessing, and prayer bracelet. Patient to notify RN when/if she would like a follow-up  visit.

## 2020-04-03 NOTE — TELEPHONE ENCOUNTER
From: Bella Mesa  To: Vanessa Alfonso MD  Sent: 4/3/2020 11:04 AM EDT  Subject: Prescription Question    Mom was taken to [facility] on 4/1. She had surgery 4/2 - a hernia was causing a bowel obstruction.      ----- Message -----   Isela Gomes MD   Sent:3/30/2020 7:28 PM EDT   To:Pina Mattson   Subject:RE: Prescription Question    Rocio Luciano to stop taking medication due to side effects      ----- Message -----   From:Pina Mattson   Sent:3/27/2020 12:36 PM EDT   Olga Cruz MD   Subject:Prescription Question    A message was sent on 3/25 regarding a new prescription and the effects on the patient. There has not been a response as to whether or not taking the medicine should continue. Please advise.

## 2020-04-03 NOTE — PLAN OF CARE
Problem: Falls - Risk of:  Goal: Will remain free from falls  Description: Will remain free from falls  4/3/2020 0720 by Trini Truong RN  Outcome: Ongoing  4/2/2020 1927 by Cherise Ariza RN  Outcome: Ongoing  Goal: Absence of physical injury  Description: Absence of physical injury  4/3/2020 0720 by Trini Truong RN  Outcome: Ongoing  4/2/2020 1927 by Cherise Ariza RN  Outcome: Ongoing     Problem:  Bowel/Gastric:  Goal: Control of bowel function will improve  Description: Control of bowel function will improve  4/3/2020 0720 by Trini Truong RN  Outcome: Ongoing  4/2/2020 1927 by Cherise Ariza RN  Outcome: Ongoing  Goal: Ability to achieve a regular elimination pattern will improve  Description: Ability to achieve a regular elimination pattern will improve  4/3/2020 0720 by Trini Truong RN  Outcome: Ongoing  4/2/2020 1927 by Cherise Ariza RN  Outcome: Ongoing     Problem: OXYGENATION/RESPIRATORY FUNCTION  Goal: Patient will achieve/maintain normal respiratory rate/effort  Outcome: Ongoing     Problem: MOBILITY  Goal: Early mobilization is achieved  Outcome: Ongoing     Problem: ELIMINATION  Goal: Elimination patterns are normal or improving  Description: Elimination patterns return to pre-surgery normal patterns  Outcome: Ongoing

## 2020-04-03 NOTE — PROGRESS NOTES
Morning assessment complete. Meds given with sip of water, pt removed NG and dunne overnight. Abdomen soft and non-distended. Surgical incision clean dry and intact. Denies pain/nausea/vomitting. Voided this AM. Pt up to chair. VSS. Pt demonstrated how to reach nurse with call light. Call light in reach. Will continue to monitor. The care plan and education has been reviewed and mutually agreed upon with the patient. 1003 Pt anxious and pulled IV out, called daughter for her to speak with her. Daughter updated and spoke with patient to call her down. New IV placed, covered at this time. Camera in room. Noted full liquid diet, tray ordered for patient.

## 2020-04-03 NOTE — PROGRESS NOTES
Shift assessment completed. Medications given per MAR. Patient denies any needs at this time. The care plan and education has been reviewed and mutually agreed upon with the patient.

## 2020-04-03 NOTE — PROGRESS NOTES
thoracic soft tissues are unremarkable. Organs: Liver enhances normally. Spleen enhances normally. Normal adrenals. Pancreas is atrophied but otherwise unremarkable. No acute or suspicious renal abnormalities are identified. GI/Bowel: There is dilated small bowel within the mid to lower abdomen, which appears to be secondary to a ventral wall hernia found along the leftward aspect of the pelvis, with incarceration of the bowel (best visualized sagittal image 128 and axial image 113). The stomach and duodenal sweep are unremarkable in appearance. The distal small bowel and large bowel are decompressed. Pelvis: Urinary bladder unremarkable. Uterus is surgically absent. No free pelvic fluid. Peritoneum/Retroperitoneum: Moderate vascular calcifications are identified. The superior mesenteric artery is enhancing. Bones/Soft Tissues: No osteolytic or osteoblastic bone lesions are identified. Pars defects are present at L5 leading to grade 1 anterolisthesis of L5 on S1. Small-bowel obstruction secondary to incarceration within a left pelvic ventral wall hernia. Scheduled Meds:   [START ON 4/4/2020] pantoprazole  40 mg Oral QAM AC    amLODIPine  5 mg Oral Daily    metoprolol succinate  50 mg Oral BID    sodium chloride flush  10 mL Intravenous 2 times per day    heparin (porcine)  5,000 Units Subcutaneous 3 times per day     Continuous Infusions:  PRN Meds:.haloperidol lactate, ondansetron, sodium chloride flush, acetaminophen **OR** acetaminophen, polyethylene glycol, acetaminophen      Assessment:  80 y.o. female admitted with   1. Small bowel obstruction ([address]    2. Inguinal hernia with obstruction without gangrene, recurrence not specified, unspecified laterality    3. Acute renal failure, unspecified acute renal failure type ([address]    4.  Nausea and vomiting, intractability of vomiting not specified, unspecified vomiting type        Status-post open left inguinal hernia repair with mesh on 4/2/2020 for incarcerated left inguinal hernia with small bowel obstruction   Acute kidney injury  Alzheimer's disease      Plan:  1. Advance to full liquid diet as tolerated  2. Patient pulled IV, okay to leave out, stop IV hydration; monitor and correct electrolytes  3. Activity as tolerated, ambulate TID, up to chair for all meals--PT/OT evaluation and treatment  4. Pulmonary toilet, incentive spirometry  5. PRN analgesics and antiemetics--no narcotics, PRN Tylenol, apply ice  6. DVT prophylaxis with heparin injections  7. Management of medical comorbid etiologies per primary team and consulting services  8. Disposition: Discharge planning; anticipate will be ready for discharge in the next 24-48 hours    EDUCATION:  Educated patient on plan of care and disease process--all questions answered. Plans discussed with patient and nursing. Reviewed and discussed with Dr. Ainsley Archibald.       Signed:  DAVID Kirkpatrick CNP  4/3/2020 9:41 AM     Surg Staff:   Pt seen and examined with NP  See full note above  Pt is awake and alert, but completely disoriented  Pt has removed IV NG and dunne  Abd and incision look fine  Will begin diet, will not replace invasive items, more harm than good  Will need to stay with family or ECF at discharge    Brandy Arce

## 2020-04-03 NOTE — PROGRESS NOTES
Patient is confused and pulled out NG tube and Butt catheter. She also tried to pull out IV access. Annmarie Valderrama CNP was notified, OK to leave NG tube and Butt catheter out for now. Patient has no symptom of nausea or vomiting. Patient was instructed to call for using bathroom. Video camera monitor is in use now.

## 2020-04-04 VITALS
RESPIRATION RATE: 14 BRPM | BODY MASS INDEX: 20.41 KG/M2 | HEIGHT: 66 IN | DIASTOLIC BLOOD PRESSURE: 78 MMHG | WEIGHT: 127 LBS | OXYGEN SATURATION: 96 % | SYSTOLIC BLOOD PRESSURE: 156 MMHG | TEMPERATURE: 98.2 F | HEART RATE: 62 BPM

## 2020-04-04 PROCEDURE — 97535 SELF CARE MNGMENT TRAINING: CPT

## 2020-04-04 PROCEDURE — 6370000000 HC RX 637 (ALT 250 FOR IP): Performed by: SURGERY

## 2020-04-04 PROCEDURE — 99024 POSTOP FOLLOW-UP VISIT: CPT | Performed by: SURGERY

## 2020-04-04 PROCEDURE — 97116 GAIT TRAINING THERAPY: CPT

## 2020-04-04 PROCEDURE — 97530 THERAPEUTIC ACTIVITIES: CPT

## 2020-04-04 PROCEDURE — 97161 PT EVAL LOW COMPLEX 20 MIN: CPT

## 2020-04-04 PROCEDURE — 97166 OT EVAL MOD COMPLEX 45 MIN: CPT

## 2020-04-04 RX ADMIN — AMLODIPINE BESYLATE 5 MG: 5 TABLET ORAL at 07:49

## 2020-04-04 RX ADMIN — METOPROLOL SUCCINATE 50 MG: 50 TABLET, EXTENDED RELEASE ORAL at 07:49

## 2020-04-04 NOTE — PROGRESS NOTES
Occupational Therapy   Occupational Therapy Initial Assessment  Date: 2020   Patient Name: Vikram Patton  MRN: [id number]     : 1933    Date of Service: 2020    Discharge Recommendations:Pina Tilley scored a 22/24 on the AM-PAC ADL Inpatient form. Current research shows that an AM-PAC score of 18 or greater is typically associated with a discharge to the patient's home setting. Based on the patients AM-PAC score and their current ADL deficits, it is recommended that the patient have 2-3 sessions per week of Occupational Therapy at d/c to increase the patients independence. If patient discharges prior to next session this note will serve as a discharge summary. Please see below for the latest assessment towards goals. HOME HEALTH CARE: LEVEL 1 STANDARD    - Initial home health evaluation to occur within 24-48 hours, in patient home   - Therapy to evaluate with goal of regaining prior level of functioning   - Therapy to evaluate if patient has [address] needs for personal care       OT Equipment Recommendations  Equipment Needed: No    Assessment   Performance deficits / Impairments: Decreased functional mobility ; Decreased ADL status; Decreased high-level IADLs;Decreased endurance;Decreased posture  Assessment: Pt presents with the above deficits impacting daily occupational performance and would benefit from continued skilled OT services. Treatment Diagnosis: Decreased mobility, ADL status, ADL transfers, IADL's, and posture associated with SBO and s/p hernia repair  Prognosis: Good  Decision Making: Medium Complexity  REQUIRES OT FOLLOW UP: Yes  Activity Tolerance  Activity Tolerance: Patient Tolerated treatment well  Safety Devices  Safety Devices in place: Yes  Type of devices: All fall risk precautions in place; Patient at risk for falls; Bed alarm in place; Left in bed;Call light within reach;Nurse notified;Gait belt  Restraints  Initially in place: No           Patient Diagnosis(es): The primary encounter diagnosis was Small bowel obstruction ([facility] [address] Diagnoses of Inguinal hernia with obstruction without gangrene, recurrence not specified, unspecified laterality, Acute renal failure, unspecified acute renal failure type ([address] and Nausea and vomiting, intractability of vomiting not specified, unspecified vomiting type were also pertinent to this visit. has a past medical history of Chronic back pain, CKD (chronic kidney disease) stage 3, GFR 30-59 ml/min (HCC), and Hypertension. has a past surgical history that includes Toe Surgery; Hysterectomy, total abdominal; and hernia repair (Left, 4/2/2020). Treatment Diagnosis: Decreased mobility, ADL status, ADL transfers, IADL's, and posture associated with SBO and s/p hernia repair      Restrictions  Restrictions/Precautions  Restrictions/Precautions: Fall Risk, General Precautions(high fall risk)  Required Braces or Orthoses?: No  Position Activity Restriction  Other position/activity restrictions: Ambulate patient. Pt admitted with abdominal pain due to incarcerated left inguinal hernia with SBO. On 4/2 had Incarcerated left inguinal hernia repair with mesh per Dr Faraz Stephens. Subjective   General  Chart Reviewed: Yes  Family / Caregiver Present: No  Diagnosis: SBO  Subjective  Subjective: Pt seated in chair on arrival and agreeable for session.    Patient Currently in Pain: Denies  Vital Signs  Patient Currently in Pain: Denies  Social/Functional History  Social/Functional History  Lives With: Daughter(dtr, Leopoldo Pho)  Type of Home: House  Home Layout: Two level, Able to Live on Main level with bedroom/bathroom  Home Access: Stairs to enter without rails  Entrance Stairs - Number of Steps: 2-3 DARIEL (pt unsure)  Bathroom Shower/Tub: Walk-in shower  Bathroom Equipment: Shower chair, Grab bars in shower  Home Equipment: Rolling walker, Cane  Receives Help From: Family  ADL Assistance: Independent  Homemaking Responsibilities:

## 2020-04-04 NOTE — PROGRESS NOTES
[address] and Laparoscopic Surgery        Progress Note    Patient Name: Dwight Bauer  MRN: [id number]  YOB: 1933  Date of Evaluation: 2020    Chief Complaint: Abdominal pain      Subjective:  No acute events overnight  Denies abdominal pain  Denies nausea or vomiting, ate well      Post-Operative Day #2    Vital Signs:  Patient Vitals for the past 24 hrs:   BP Temp Temp src Pulse Resp SpO2   20 0730 (!) 156/78 98.2 °F (36.8 °C) Oral 62 14 96 %   20 0335 (!) 159/75 98.3 °F (36.8 °C) Oral 68 16 95 %   20 0034 137/74 98.6 °F (37 °C) Oral 68 16 95 %   20 2147 (!) 165/75 98.5 °F (36.9 °C) Oral 75 16 96 %   20 1539 (!) 153/77 98.7 °F (37.1 °C) Oral 75 16 97 %   20 1137 (!) 146/73 98 °F (36.7 °C) Oral 78 16 94 %      TEMPERATURE HISTORY 24H: Temp (24hrs), Av.4 °F (36.9 °C), Min:98 °F (36.7 °C), Max:98.7 °F (37.1 °C)    BLOOD PRESSURE HISTORY: Systolic (84TBI), CKL:841 , Min:137 , TYP:384    Diastolic (72BBS), PAV:76, Min:70, Max:78      Intake/Output:  I/O last 3 completed shifts: In: 480 [P.O.:480]  Out: 950 [Urine:950]  No intake/output data recorded. Drain/tube Output:       Physical Exam:  General: awake, alert, confused to situation/place  Lungs: unlabored respirations  Abdomen: soft, non-distended, minimal incisional tenderness only, bowel sounds present   Skin/Wound: healing well, no drainage, well approximated    Labs:  CBC:    Recent Labs     20  2118 20  0534 20  0419   WBC 9.4 8.0 6.4   HGB 14.5 13.6 12.1   HCT 44.0 40.4 36.9    250 208     BMP:    Recent Labs     20  0534 20  0419    139 142   K 3.4* 3.5 4.0   CL 95* 99 108   CO2 28 25 23   BUN 29* 29* 24*   CREATININE 1.3* 1.0 0.9   GLUCOSE 143* 131* 102*     Hepatic:    Recent Labs     20  0534 20  0419   AST 26 23 22   ALT 16 13 9*   BILITOT 0.6 0.5 0.5   ALKPHOS 66 60 50     Amylase:   No osteolytic or osteoblastic bone lesions are identified. Pars defects are present at L5 leading to grade 1 anterolisthesis of L5 on S1. Small-bowel obstruction secondary to incarceration within a left pelvic ventral wall hernia. Scheduled Meds:   pantoprazole  40 mg Oral QAM AC    amLODIPine  5 mg Oral Daily    metoprolol succinate  50 mg Oral BID    sodium chloride flush  10 mL Intravenous 2 times per day    heparin (porcine)  5,000 Units Subcutaneous 3 times per day     Continuous Infusions:  PRN Meds:.ondansetron, sodium chloride flush, acetaminophen **OR** acetaminophen, polyethylene glycol, acetaminophen      Assessment:  80 y.o. female admitted with   1. Small bowel obstruction ([address]    2. Inguinal hernia with obstruction without gangrene, recurrence not specified, unspecified laterality    3. Acute renal failure, unspecified acute renal failure type ([address]    4. Nausea and vomiting, intractability of vomiting not specified, unspecified vomiting type        Status-post open left inguinal hernia repair with mesh on 4/2/2020 for incarcerated left inguinal hernia with small bowel obstruction   Acute kidney injury  Alzheimer's disease      Plan:  Pt doing well post op  Tolerating diet, much more aware of self and situation  OK for DC home with Daughter today  Will call for post op follow up virtual visit    EDUCATION:  Educated patient on plan of care and disease process--all questions answered.     Plans discussed with patient and Hospitalist        Signed:      Arabella Wilcox

## 2020-04-04 NOTE — PROGRESS NOTES
Exceptions to [facility]  Hearing Exceptions: Hard of hearing/hearing concerns     Subjective  General  Chart Reviewed: Yes  Response To Previous Treatment: Patient with no complaints from previous session. Family / Caregiver Present: Yes(OT for eval)  Diagnosis: SBO  Follows Commands: Impaired(repeat instructions needed at times)  General Comment  Comments: Pt seated in chair upon arrival.   Subjective  Subjective: Pt denies pain at rest, agreeable to working with PT. Pain Screening  Patient Currently in Pain: Denies  Vital Signs  Patient Currently in Pain: Denies       Orientation  Orientation  Overall Orientation Status: Impaired  Orientation Level: Oriented to situation;Oriented to person;Disoriented to time;Disoriented to place  Social/Functional History  Social/Functional History  Lives With: Daughter(dtr, Lety Link)  Type of Home: House  Home Layout: Two level, Able to Live on Main level with bedroom/bathroom  Home Access: Stairs to enter without rails  Entrance Stairs - Number of Steps: 2-3 DARIEL (pt unsure)  Bathroom Shower/Tub: Walk-in shower  Bathroom Equipment: Shower chair, Grab bars in shower  Home Equipment: Rolling walker, Cane  Receives Help From: Family  ADL Assistance: Independent  Homemaking Responsibilities: No  Ambulation Assistance: Independent  Transfer Assistance: Independent  Additional Comments: Daughter is retired and home with pt. Pt reports ambulating in home using cane. Pt reports no falls in last 6 months. Cognition        Objective     Observation/Palpation  Posture: Good  Observation: well healing abdominal incision.     AROM RLE (degrees)  RLE AROM: WFL  AROM LLE (degrees)  LLE AROM : WFL  Strength RLE  Comment: grossly 4/5  Strength LLE  Comment: grossly 4/5     Sensation  Overall Sensation Status: WFL  Bed mobility  Supine to Sit: (Pt up in chair at start of treatment. )  Sit to Supine: Independent  Scooting: Independent  Transfers  Sit to Stand: Supervision(from chair and

## 2020-04-04 NOTE — DISCHARGE SUMMARY
with her daughter. 2. HTN: stable during her stay on home therapy of  CCB, BB and ARB with HCTZ:   3. CKD:  Stable , creat was 0.9 prior to d/c home  4. Consults. IP CONSULT TO GENERAL SURGERY  IP CONSULT TO HOSPITALIST  IP CONSULT TO SOCIAL WORK    Physical examination on discharge day. BP (!) 156/78   Pulse 62   Temp 98.2 °F (36.8 °C) (Oral)   Resp 14   Ht 5' 6\" (1.676 m)   Wt 127 lb (57.6 kg)   SpO2 96%   BMI 20.50 kg/m²   General appearance. Alert. Looks comfortable. Looks younger than stated age   [de-identified]. Sclera clear. Moist mucus membranes. Cardiovascular. Regular rate and rhythm, normal S1, S2. No murmur. Respiratory. Not using accessory muscles. Clear to auscultation bilaterally, no wheeze. Gastrointestinal. Abdomen soft, non-tender, not distended, normal bowel sounds. Left lower quadrant incision well approximated and clean   Neurology. Facial symmetry. No speech deficits. Moving all extremities equally. Extremities. No edema in lower extremities. Skin. Warm, dry, normal turgor    Condition at time of discharge stable     Medication instructions provided to patient at discharge.      Medication List      CONTINUE taking these medications    alendronate 70 MG tablet  Commonly known as:  FOSAMAX     amLODIPine 10 MG tablet  Commonly known as:  NORVASC  TAKE ONE TABLET BY MOUTH ONCE DAILY     aspirin 81 MG tablet     clotrimazole-betamethasone 1-0.05 % cream  Commonly known as:  Lotrisone  Apply topically 2 times daily and cancel script for triamcinolone 0.1 % cream     hydroCHLOROthiazide 25 MG tablet  Commonly known as:  HYDRODIURIL  TAKE 1 TABLET BY MOUTH ONCE DAILY     losartan 100 MG tablet  Commonly known as:  COZAAR  TAKE ONE TABLET BY MOUTH ONCE DAILY     metoprolol succinate 50 MG extended release tablet  Commonly known as:  TOPROL XL  TAKE 1 TABLET BY MOUTH TWICE DAILY     MULTIVITAMINS PO     ondansetron 4 MG tablet  Commonly known as:  ZOFRAN  Take 1 tablet by mouth every 8

## 2020-04-04 NOTE — CARE COORDINATION
Home care orders and demographics faxed to [facility] at [phone]. Patient discharging 4/4/2020 to home with home care. All discharge needs met per case management.     Curt Dinh RN, BSN  [phone]

## 2020-04-06 ENCOUNTER — CARE COORDINATION (OUTPATIENT)
Dept: CASE MANAGEMENT | Age: 85
End: 2020-04-06

## 2020-04-06 ENCOUNTER — TELEPHONE (OUTPATIENT)
Dept: PRIMARY CARE CLINIC | Age: 85
End: 2020-04-06

## 2020-04-06 PROCEDURE — 1111F DSCHRG MED/CURRENT MED MERGE: CPT | Performed by: FAMILY MEDICINE

## 2020-04-06 NOTE — TELEPHONE ENCOUNTER
Asking for verbal orders:  Skilled nursing, OT and PT  Matteo Schroeder with [address]  [phone]

## 2020-04-06 NOTE — CARE COORDINATION
Addi 45 Transitions Initial Follow Up Call    Call within 2 business days of discharge: Yes    Patient: Brandy Nelson Patient : 1933   MRN: [id number]  Reason for Admission: S/P surgical repair of left incarcerated inguinal   Discharge Date: 20 RARS: Readmission Risk Score: 15      Last Discharge [facility]       Complaint Diagnosis Description Type Department Provider    20 Shortness of Breath Small bowel obstruction ([address] . .. ED to Hosp-Admission (Discharged) (ADMITTED) [facility] 4T Aileen Loyd MD; Kadeem Ward. .. Spoke with: pt's daughter,Sendy, HIPAA verified    Facility:[facility]    Non-face-to-face services provided:  Obtained and reviewed discharge summary and/or continuity of care documents    Care Transitions 24 Hour Call    Do you have any ongoing symptoms?:  No  Do you have a copy of your discharge instructions?:  Yes  Do you have all of your prescriptions and are they filled?:  Yes  Have you been contacted by a [address]?:  No  Have you scheduled your follow up appointment?:  No  Were you discharged with any Home Care or Post Acute Services:  Yes  Post Acute Services:  Home Health (Comment: [facility])  Do you feel like you have everything you need to keep you well at home?:  Yes  Care Transitions Interventions  No Identified Needs       Pt's daughter states pt is doing well, no issues or concerns. Incision CDI, denies pain, fever. No new meds, reviewed all others. [facility]. nurse on her way out.  Agreed to more CTC f/u calls      Follow Up  Future Appointments   Date Time Provider Noel Chinchilla   2020  9:15 AM MD [address] [facility] PAMELA Arias, LESTER

## 2020-04-09 ENCOUNTER — TELEPHONE (OUTPATIENT)
Dept: SURGERY | Age: 85
End: 2020-04-09

## 2020-04-09 NOTE — TELEPHONE ENCOUNTER
Spoke to pt dtr, she states she is doing good, no issues with surgery. Pt has home nurse coming in and also PT. Dtr cancelling post op appt. Advised to call if any changes.   jeevan 19

## 2020-04-11 RX ORDER — LOSARTAN POTASSIUM 100 MG/1
TABLET ORAL
Qty: 90 TABLET | Refills: 1 | Status: SHIPPED | OUTPATIENT
Start: 2020-04-11 | End: 2021-03-20 | Stop reason: SDUPTHER

## 2020-04-13 ENCOUNTER — CARE COORDINATION (OUTPATIENT)
Dept: CASE MANAGEMENT | Age: 85
End: 2020-04-13

## 2020-04-20 ENCOUNTER — CARE COORDINATION (OUTPATIENT)
Dept: CASE MANAGEMENT | Age: 85
End: 2020-04-20

## 2020-04-22 ENCOUNTER — CARE COORDINATION (OUTPATIENT)
Dept: CASE MANAGEMENT | Age: 85
End: 2020-04-22

## 2020-04-22 NOTE — CARE COORDINATION
Addi 45 Transitions Follow Up Call    2020    Patient: Aron Crowell  Patient : 1933   MRN: [id number]  Reason for Admission: surgical repair of left incarcerated inguinal hernia;[facility]  Discharge Date: 20 RARS: Readmission Risk Score: 15             Care Transitions Subsequent and Final Call    Subsequent and Final Calls  Care Transitions Interventions  Other Interventions: Follow Up: Follow up outreach call attempt, no answer. CTN left VM with contact information and request for return call. CTN will continue with outreach call attempts.     Future Appointments   Date Time Provider Noel Chinchilla   2020  9:15 AM Marquez Walters MD [facility] [address], [address] [facility]       Ardeen Landau, RN

## 2020-04-24 ENCOUNTER — CARE COORDINATION (OUTPATIENT)
Dept: CASE MANAGEMENT | Age: 85
End: 2020-04-24

## 2020-04-24 NOTE — CARE COORDINATION
Addi 45 Transitions Follow Up Call    2020    Patient: Anay Overcast  Patient : 1933   MRN: [id number]  Reason for Admission:   Discharge Date: 20 RARS: Readmission Risk Score: 15    3rd and final attempt at a f/u call, contact info left on vm      Follow Up  Future Appointments   Date Time Provider Noel Chinchilla   2020  9:15 AM MD Chiara Landeros RD [facility] PAMELA Juárez RN

## 2020-04-29 ENCOUNTER — TELEPHONE (OUTPATIENT)
Dept: PRIMARY CARE CLINIC | Age: 85
End: 2020-04-29

## 2020-04-30 ENCOUNTER — TELEPHONE (OUTPATIENT)
Dept: PRIMARY CARE CLINIC | Age: 85
End: 2020-04-30

## 2020-05-02 PROBLEM — E86.0 DEHYDRATION: Status: RESOLVED | Noted: 2020-04-02 | Resolved: 2020-05-02

## 2020-05-04 ENCOUNTER — TELEPHONE (OUTPATIENT)
Dept: PRIMARY CARE CLINIC | Age: 85
End: 2020-05-04

## 2020-05-06 ENCOUNTER — TELEPHONE (OUTPATIENT)
Dept: PRIMARY CARE CLINIC | Age: 85
End: 2020-05-06

## 2020-06-10 ENCOUNTER — TELEPHONE (OUTPATIENT)
Dept: PRIMARY CARE CLINIC | Age: 85
End: 2020-06-10

## 2020-06-11 ENCOUNTER — TELEPHONE (OUTPATIENT)
Dept: PRIMARY CARE CLINIC | Age: 85
End: 2020-06-11

## 2020-06-11 NOTE — TELEPHONE ENCOUNTER
This patient was recently hosp. For SBO  Surgery, currently back at home  She has had significantly mental status  Change since surgery . Memory is poor. Has close supervision by relative daughter   Jaycob Bronson. Some concern for dehydration  And oral intake also.       Will check labs  Ck CT head    Daughter request consult with  Dr Noah De Los Santos [facility] for  Geriatric evaluation

## 2020-06-16 ENCOUNTER — HOSPITAL ENCOUNTER (OUTPATIENT)
Age: 85
Discharge: HOME | End: 2020-06-16
Payer: MEDICARE

## 2020-06-16 ENCOUNTER — HOSPITAL ENCOUNTER (OUTPATIENT)
Dept: CT IMAGING | Age: 85
Discharge: HOME | End: 2020-06-16
Payer: MEDICARE

## 2020-06-16 LAB
A/G RATIO: 1.3 (ref 1.1–2.2)
ALBUMIN SERPL-MCNC: 4.2 G/DL (ref 3.4–5)
ALP BLD-CCNC: 62 U/L (ref 40–129)
ALT SERPL-CCNC: 15 U/L (ref 10–40)
ANION GAP SERPL CALCULATED.3IONS-SCNC: 11 MMOL/L (ref 3–16)
AST SERPL-CCNC: 30 U/L (ref 15–37)
BILIRUB SERPL-MCNC: 0.4 MG/DL (ref 0–1)
BUN BLDV-MCNC: 18 MG/DL (ref 7–20)
CALCIUM SERPL-MCNC: 9 MG/DL (ref 8.3–10.6)
CHLORIDE BLD-SCNC: 87 MMOL/L (ref 99–110)
CO2: 26 MMOL/L (ref 21–32)
CREAT SERPL-MCNC: 0.7 MG/DL (ref 0.6–1.2)
FOLATE: >20 NG/ML (ref 4.78–24.2)
GFR AFRICAN AMERICAN: >60
GFR NON-AFRICAN AMERICAN: >60
GLOBULIN: 3.2 G/DL
GLUCOSE BLD-MCNC: 96 MG/DL (ref 70–99)
HCT VFR BLD CALC: 40.4 % (ref 36–48)
HEMOGLOBIN: 13.5 G/DL (ref 12–16)
MCH RBC QN AUTO: 28.1 PG (ref 26–34)
MCHC RBC AUTO-ENTMCNC: 33.3 G/DL (ref 31–36)
MCV RBC AUTO: 84.6 FL (ref 80–100)
PDW BLD-RTO: 14.3 % (ref 12.4–15.4)
PLATELET # BLD: 265 K/UL (ref 135–450)
PMV BLD AUTO: 8.3 FL (ref 5–10.5)
POTASSIUM SERPL-SCNC: 3.5 MMOL/L (ref 3.5–5.1)
RBC # BLD: 4.78 M/UL (ref 4–5.2)
SODIUM BLD-SCNC: 124 MMOL/L (ref 136–145)
TOTAL PROTEIN: 7.4 G/DL (ref 6.4–8.2)
TSH SERPL DL<=0.05 MIU/L-ACNC: 1.71 UIU/ML (ref 0.27–4.2)
VITAMIN B-12: 1452 PG/ML (ref 211–911)
WBC # BLD: 5 K/UL (ref 4–11)

## 2020-06-16 PROCEDURE — 82746 ASSAY OF FOLIC ACID SERUM: CPT

## 2020-06-16 PROCEDURE — 82607 VITAMIN B-12: CPT

## 2020-06-16 PROCEDURE — 70450 CT HEAD/BRAIN W/O DYE: CPT

## 2020-06-16 PROCEDURE — 36415 COLL VENOUS BLD VENIPUNCTURE: CPT

## 2020-06-16 PROCEDURE — 80053 COMPREHEN METABOLIC PANEL: CPT

## 2020-06-16 PROCEDURE — 84443 ASSAY THYROID STIM HORMONE: CPT

## 2020-06-16 PROCEDURE — 85027 COMPLETE CBC AUTOMATED: CPT

## 2020-07-10 RX ORDER — HYDROCHLOROTHIAZIDE 25 MG/1
TABLET ORAL
Qty: 90 TABLET | Refills: 1 | Status: SHIPPED | OUTPATIENT
Start: 2020-07-10 | End: 2020-07-12 | Stop reason: SDUPTHER

## 2020-07-12 RX ORDER — HYDROCHLOROTHIAZIDE 25 MG/1
TABLET ORAL
Qty: 90 TABLET | Refills: 0 | Status: SHIPPED | OUTPATIENT
Start: 2020-07-12 | End: 2020-10-01 | Stop reason: SDUPTHER

## 2020-07-13 ENCOUNTER — OFFICE VISIT (OUTPATIENT)
Dept: PRIMARY CARE CLINIC | Age: 85
End: 2020-07-13
Payer: MEDICARE

## 2020-07-13 VITALS
BODY MASS INDEX: 20.14 KG/M2 | SYSTOLIC BLOOD PRESSURE: 130 MMHG | OXYGEN SATURATION: 99 % | HEIGHT: 64 IN | DIASTOLIC BLOOD PRESSURE: 88 MMHG | HEART RATE: 77 BPM | WEIGHT: 118 LBS | TEMPERATURE: 97.2 F

## 2020-07-13 PROCEDURE — 1090F PRES/ABSN URINE INCON ASSESS: CPT | Performed by: FAMILY MEDICINE

## 2020-07-13 PROCEDURE — G8420 CALC BMI NORM PARAMETERS: HCPCS | Performed by: FAMILY MEDICINE

## 2020-07-13 PROCEDURE — G8427 DOCREV CUR MEDS BY ELIG CLIN: HCPCS | Performed by: FAMILY MEDICINE

## 2020-07-13 PROCEDURE — 4040F PNEUMOC VAC/ADMIN/RCVD: CPT | Performed by: FAMILY MEDICINE

## 2020-07-13 PROCEDURE — 99213 OFFICE O/P EST LOW 20 MIN: CPT | Performed by: FAMILY MEDICINE

## 2020-07-13 PROCEDURE — 1036F TOBACCO NON-USER: CPT | Performed by: FAMILY MEDICINE

## 2020-07-13 PROCEDURE — 1123F ACP DISCUSS/DSCN MKR DOCD: CPT | Performed by: FAMILY MEDICINE

## 2020-07-13 ASSESSMENT — ENCOUNTER SYMPTOMS
CHEST TIGHTNESS: 0
VOMITING: 0
EYE PAIN: 0
RECTAL PAIN: 0
CONSTIPATION: 0
STRIDOR: 0
SHORTNESS OF BREATH: 0
COUGH: 0
BLOOD IN STOOL: 0
ABDOMINAL DISTENTION: 0
BACK PAIN: 0
NAUSEA: 0
EYE ITCHING: 0
EYE REDNESS: 0
SINUS PRESSURE: 0
APNEA: 0
SORE THROAT: 0
COLOR CHANGE: 0
DIARRHEA: 0
TROUBLE SWALLOWING: 0
PHOTOPHOBIA: 0
EYE DISCHARGE: 0
CHOKING: 0
RHINORRHEA: 0
WHEEZING: 0

## 2020-07-13 NOTE — PROGRESS NOTES
Subjective:      Patient ID: Dacia Mccollum is a 80 y.o. female. HPI  79 y/o female well known to be  With late onset Alzheimer's and htn    She has no new c/o today  Lives alone with close supervision from daughter  CT sm vessel disease     Lab serum sodium 124 but no sxs    Review of Systems   Constitutional: Negative for activity change, appetite change, chills, diaphoresis, fatigue and fever. HENT: Negative for congestion, dental problem, drooling, ear discharge, ear pain, hearing loss, mouth sores, nosebleeds, postnasal drip, rhinorrhea, sinus pressure, sneezing, sore throat, tinnitus and trouble swallowing. Eyes: Negative for photophobia, pain, discharge, redness, itching and visual disturbance. Respiratory: Negative for apnea, cough, choking, chest tightness, shortness of breath, wheezing and stridor. Cardiovascular: Negative for chest pain, palpitations and leg swelling. Gastrointestinal: Negative for abdominal distention, blood in stool, constipation, diarrhea, nausea, rectal pain and vomiting. Genitourinary: Negative for decreased urine volume, difficulty urinating, dyspareunia, dysuria, enuresis, flank pain, frequency, genital sores, hematuria, menstrual problem, pelvic pain, urgency, vaginal bleeding and vaginal pain. Musculoskeletal: Negative for arthralgias, back pain, gait problem, joint swelling, myalgias, neck pain and neck stiffness. Skin: Negative for color change, pallor, rash and wound. Neurological: Negative for dizziness, tremors, seizures, syncope, facial asymmetry, speech difficulty, weakness, light-headedness, numbness and headaches. Hematological: Negative for adenopathy. Does not bruise/bleed easily. Psychiatric/Behavioral: Negative for agitation, behavioral problems, confusion, decreased concentration, dysphoric mood, hallucinations, self-injury, sleep disturbance and suicidal ideas. The patient is not nervous/anxious and is not hyperactive.         Objective:

## 2020-10-01 RX ORDER — HYDROCHLOROTHIAZIDE 25 MG/1
TABLET ORAL
Qty: 90 TABLET | Refills: 0 | Status: SHIPPED | OUTPATIENT
Start: 2020-10-01 | End: 2020-12-21

## 2020-10-01 NOTE — TELEPHONE ENCOUNTER
[address] [place] [address] Dr Lennon   Phone: [phone]  Follow up:   Pt's daughter is calling for a refill on the previous msg. Med requested;  ~ hydroCHLOROthiazide (HYDRODIURIL) 25 MG tablet   pls call pt's daughter once sent.  Thank you   pls send into [facility]: [phone]

## 2020-10-01 NOTE — TELEPHONE ENCOUNTER
Medication:   Requested Prescriptions     Pending Prescriptions Disp Refills    hydroCHLOROthiazide (HYDRODIURIL) 25 MG tablet [Pharmacy Med Name: hydroCHLOROthiazide 25 MG Oral Tablet] 90 tablet 0     Sig: Take 1 tablet by mouth once daily        Last Filled:      Patient Phone Number: [phone] (home)     Last appt: 7/13/2020   Next appt: 10/20/2020    Last OARRS: No flowsheet data found.

## 2020-10-09 ENCOUNTER — TELEPHONE (OUTPATIENT)
Dept: PRIMARY CARE CLINIC | Age: 85
End: 2020-10-09

## 2020-10-09 NOTE — TELEPHONE ENCOUNTER
----- Message from Haider Miquel sent at 10/8/2020  4:28 PM EDT -----  Subject: Message to Provider    QUESTIONS  Information for Provider? Pt wants to make her office visit and pre op apt   to be together instead of having 2 different apts. They are the apts on   11/02/2020 at 10:00 AM & 10/20/2020 at 3:30. Pt already called yesterday   and send a message to provider but never received a call back  ---------------------------------------------------------------------------  --------------  [address]  What is the best way for the office to contact you? Do not leave any   message   patient will call back for answer  Preferred Call Back Phone Number? [phone]  ---------------------------------------------------------------------------  --------------  SCRIPT ANSWERS  Relationship to Patient? Other  Representative Name? [employer] [place] [employer]   Is the Representative on the appropriate HIPAA document in Epic?  Yes

## 2020-10-20 ENCOUNTER — OFFICE VISIT (OUTPATIENT)
Dept: PRIMARY CARE CLINIC | Age: 85
End: 2020-10-20
Payer: MEDICARE

## 2020-10-20 VITALS
TEMPERATURE: 96.4 F | BODY MASS INDEX: 19.74 KG/M2 | DIASTOLIC BLOOD PRESSURE: 60 MMHG | OXYGEN SATURATION: 82 % | SYSTOLIC BLOOD PRESSURE: 138 MMHG | WEIGHT: 115 LBS | HEART RATE: 59 BPM

## 2020-10-20 PROCEDURE — 1123F ACP DISCUSS/DSCN MKR DOCD: CPT | Performed by: FAMILY MEDICINE

## 2020-10-20 PROCEDURE — G8484 FLU IMMUNIZE NO ADMIN: HCPCS | Performed by: FAMILY MEDICINE

## 2020-10-20 PROCEDURE — 4040F PNEUMOC VAC/ADMIN/RCVD: CPT | Performed by: FAMILY MEDICINE

## 2020-10-20 PROCEDURE — 1036F TOBACCO NON-USER: CPT | Performed by: FAMILY MEDICINE

## 2020-10-20 PROCEDURE — G0008 ADMIN INFLUENZA VIRUS VAC: HCPCS | Performed by: FAMILY MEDICINE

## 2020-10-20 PROCEDURE — 90662 IIV NO PRSV INCREASED AG IM: CPT | Performed by: FAMILY MEDICINE

## 2020-10-20 PROCEDURE — 1090F PRES/ABSN URINE INCON ASSESS: CPT | Performed by: FAMILY MEDICINE

## 2020-10-20 PROCEDURE — 99214 OFFICE O/P EST MOD 30 MIN: CPT | Performed by: FAMILY MEDICINE

## 2020-10-20 PROCEDURE — G8420 CALC BMI NORM PARAMETERS: HCPCS | Performed by: FAMILY MEDICINE

## 2020-10-20 PROCEDURE — G8427 DOCREV CUR MEDS BY ELIG CLIN: HCPCS | Performed by: FAMILY MEDICINE

## 2020-10-20 ASSESSMENT — ENCOUNTER SYMPTOMS
STRIDOR: 0
WHEEZING: 0
TROUBLE SWALLOWING: 0
BLOOD IN STOOL: 0
SORE THROAT: 0
NAUSEA: 0
RHINORRHEA: 0
ABDOMINAL DISTENTION: 0
APNEA: 0
COUGH: 0
DIARRHEA: 0
CHOKING: 0
COLOR CHANGE: 0
EYE REDNESS: 0
CHEST TIGHTNESS: 0
SINUS PRESSURE: 0
PHOTOPHOBIA: 0
VOMITING: 0
BACK PAIN: 0
RECTAL PAIN: 0
EYE DISCHARGE: 0
CONSTIPATION: 0
SHORTNESS OF BREATH: 0
EYE ITCHING: 0
EYE PAIN: 0

## 2020-10-20 NOTE — PROGRESS NOTES
Subjective:      Patient ID: Bartolo Ibrahim is a 80 y.o. female. Pre op exam  bp follow up  HPI  81 y/o female known hypertension, mid cognitive impairment, who has  Had blurred vision  With diagnoses of bilateral cataracts. She is scheduled  For left cataract surgery 11/10/2020 and right eye 12/08/2020. SURGEON   [facility]  She denies chest pain, sob, stroke, heart attack, diabetes, or kidney disease    She denies bleeding issues   She denies anesthesia issues in  The past    Her blood pressure has been controlled    No Known Allergies  Past Medical History:   Diagnosis Date    Chronic back pain     CKD (chronic kidney disease) stage 3, GFR 30-59 ml/min ([facility])     Hypertension      Social History     Socioeconomic History    Marital status:       Spouse name: Not on file    Number of children: Not on file    Years of education: Not on file    Highest education level: Not on file   Occupational History    Occupation: Retired   Social Needs    Financial resource strain: Not on file    Food insecurity     Worry: Not on file     Inability: Not on file   Celframe needs     Medical: Not on file     Non-medical: Not on file   Tobacco Use    Smoking status: Never Smoker    Smokeless tobacco: Never Used   Substance and Sexual Activity    Alcohol use: No    Drug use: No    Sexual activity: Never   Lifestyle    Physical activity     Days per week: Not on file     Minutes per session: Not on file    Stress: Not on file   Relationships    Social connections     Talks on phone: Not on file     Gets together: Not on file     Attends Sikh service: Not on file     Active member of club or organization: Not on file     Attends meetings of clubs or organizations: Not on file     Relationship status: Not on file    Intimate partner violence     Fear of current or ex partner: Not on file     Emotionally abused: Not on file     Physically abused: Not on file     Forced sexual activity: Not on file   Other Topics Concern    Not on file   Social History Narrative    Not on file     Past Surgical History:   Procedure Laterality Date    HERNIA REPAIR Left 4/2/2020    REPAIR OF INCARCERATED LEFT INGUINAL HERNIA WITH  MESH performed by Natasha Dawson MD at [address] [place] [address], TOTAL ABDOMINAL      TOE SURGERY       Family History   Problem Relation Age of Onset    Diabetes Mother     Diabetes Sister            Review of Systems   Constitutional: Negative for activity change, appetite change, chills, diaphoresis, fatigue and fever. HENT: Negative for congestion, dental problem, drooling, ear discharge, ear pain, hearing loss, mouth sores, nosebleeds, postnasal drip, rhinorrhea, sinus pressure, sneezing, sore throat, tinnitus and trouble swallowing. Eyes: Negative for photophobia, pain, discharge, redness, itching and visual disturbance. Respiratory: Negative for apnea, cough, choking, chest tightness, shortness of breath, wheezing and stridor. Cardiovascular: Negative for chest pain, palpitations and leg swelling. Gastrointestinal: Negative for abdominal distention, blood in stool, constipation, diarrhea, nausea, rectal pain and vomiting. Genitourinary: Negative for decreased urine volume, difficulty urinating, dyspareunia, dysuria, enuresis, flank pain, frequency, genital sores, hematuria, menstrual problem, pelvic pain, urgency, vaginal bleeding and vaginal pain. Musculoskeletal: Negative for arthralgias, back pain, gait problem, joint swelling, myalgias, neck pain and neck stiffness. Skin: Negative for color change, pallor, rash and wound. Neurological: Negative for dizziness, tremors, seizures, syncope, facial asymmetry, speech difficulty, weakness, light-headedness, numbness and headaches. Hematological: Negative for adenopathy. Does not bruise/bleed easily.    Psychiatric/Behavioral: Negative for agitation, behavioral problems, confusion, decreased concentration, normal and symmetric. Reflexes normal.   Psychiatric:         Behavior: Behavior normal.         Thought Content: Thought content normal.         Judgment: Judgment normal.         Assessment:      1. Pre-op exam  Medically clear for surgery  - CBC; Future  - Comprehensive Metabolic Panel; Future    2. Essential hypertension  bp at goal < 140/90  Ck labs  - CBC; Future  - Comprehensive Metabolic Panel;  Future          Plan:      Medially clear for surgery  Consult note sent to surgeon        Jennifer Craig MD

## 2020-10-23 DIAGNOSIS — Z01.818 PRE-OP EXAM: ICD-10-CM

## 2020-10-23 DIAGNOSIS — I10 ESSENTIAL HYPERTENSION: ICD-10-CM

## 2020-10-23 LAB
A/G RATIO: 1.4 (ref 1.1–2.2)
ALBUMIN SERPL-MCNC: 4.1 G/DL (ref 3.4–5)
ALP BLD-CCNC: 92 U/L (ref 40–129)
ALT SERPL-CCNC: 15 U/L (ref 10–40)
ANION GAP SERPL CALCULATED.3IONS-SCNC: 11 MMOL/L (ref 3–16)
AST SERPL-CCNC: 27 U/L (ref 15–37)
BILIRUB SERPL-MCNC: 0.3 MG/DL (ref 0–1)
BUN BLDV-MCNC: 20 MG/DL (ref 7–20)
CALCIUM SERPL-MCNC: 9.3 MG/DL (ref 8.3–10.6)
CHLORIDE BLD-SCNC: 96 MMOL/L (ref 99–110)
CO2: 28 MMOL/L (ref 21–32)
CREAT SERPL-MCNC: 0.8 MG/DL (ref 0.6–1.2)
GFR AFRICAN AMERICAN: >60
GFR NON-AFRICAN AMERICAN: >60
GLOBULIN: 2.9 G/DL
GLUCOSE BLD-MCNC: 95 MG/DL (ref 70–99)
HCT VFR BLD CALC: 36.8 % (ref 36–48)
HEMOGLOBIN: 12.2 G/DL (ref 12–16)
MCH RBC QN AUTO: 27 PG (ref 26–34)
MCHC RBC AUTO-ENTMCNC: 33.3 G/DL (ref 31–36)
MCV RBC AUTO: 81.2 FL (ref 80–100)
PDW BLD-RTO: 15 % (ref 12.4–15.4)
PLATELET # BLD: 262 K/UL (ref 135–450)
PMV BLD AUTO: 8.7 FL (ref 5–10.5)
POTASSIUM SERPL-SCNC: 3.9 MMOL/L (ref 3.5–5.1)
RBC # BLD: 4.53 M/UL (ref 4–5.2)
SODIUM BLD-SCNC: 135 MMOL/L (ref 136–145)
TOTAL PROTEIN: 7 G/DL (ref 6.4–8.2)
WBC # BLD: 5.3 K/UL (ref 4–11)

## 2020-11-11 ENCOUNTER — TELEPHONE (OUTPATIENT)
Dept: PRIMARY CARE CLINIC | Age: 85
End: 2020-11-11

## 2020-11-12 RX ORDER — METOPROLOL SUCCINATE 50 MG/1
TABLET, EXTENDED RELEASE ORAL
Qty: 180 TABLET | Refills: 0 | Status: SHIPPED | OUTPATIENT
Start: 2020-11-12 | End: 2021-06-01

## 2020-11-12 RX ORDER — METOPROLOL SUCCINATE 50 MG/1
TABLET, EXTENDED RELEASE ORAL
Qty: 180 TABLET | Refills: 3 | Status: SHIPPED | OUTPATIENT
Start: 2020-11-12 | End: 2020-11-12 | Stop reason: SDUPTHER

## 2020-11-12 NOTE — TELEPHONE ENCOUNTER
Medication:   Requested Prescriptions     Pending Prescriptions Disp Refills    metoprolol succinate (TOPROL XL) 50 MG extended release tablet 180 tablet 3     Sig: TAKE 1 TABLET BY MOUTH TWICE DAILY        Last Filled:      Patient Phone Number: [phone] (home)     Last appt: 10/20/2020   Next appt: 1/26/2021    Last OARRS: No flowsheet data found.

## 2020-12-21 RX ORDER — HYDROCHLOROTHIAZIDE 25 MG/1
TABLET ORAL
Qty: 90 TABLET | Refills: 0 | Status: SHIPPED | OUTPATIENT
Start: 2020-12-21 | End: 2021-02-23

## 2021-01-25 DIAGNOSIS — F02.818 LATE ONSET ALZHEIMER'S DISEASE WITH BEHAVIORAL DISTURBANCE (HCC): ICD-10-CM

## 2021-01-25 DIAGNOSIS — G30.1 LATE ONSET ALZHEIMER'S DISEASE WITH BEHAVIORAL DISTURBANCE (HCC): ICD-10-CM

## 2021-01-25 RX ORDER — DONEPEZIL HYDROCHLORIDE 5 MG/1
5 TABLET, FILM COATED ORAL NIGHTLY
Qty: 30 TABLET | Refills: 5 | Status: SHIPPED | OUTPATIENT
Start: 2021-01-25 | End: 2021-02-15 | Stop reason: SDUPTHER

## 2021-01-26 ENCOUNTER — OFFICE VISIT (OUTPATIENT)
Dept: PRIMARY CARE CLINIC | Age: 86
End: 2021-01-26
Payer: MEDICARE

## 2021-01-26 VITALS
SYSTOLIC BLOOD PRESSURE: 105 MMHG | BODY MASS INDEX: 18.57 KG/M2 | HEART RATE: 62 BPM | DIASTOLIC BLOOD PRESSURE: 62 MMHG | OXYGEN SATURATION: 94 % | WEIGHT: 108.2 LBS | TEMPERATURE: 96.8 F

## 2021-01-26 DIAGNOSIS — R63.4 WEIGHT LOSS: Primary | ICD-10-CM

## 2021-01-26 DIAGNOSIS — I95.9 HYPOTENSION, UNSPECIFIED HYPOTENSION TYPE: ICD-10-CM

## 2021-01-26 PROCEDURE — 4040F PNEUMOC VAC/ADMIN/RCVD: CPT | Performed by: FAMILY MEDICINE

## 2021-01-26 PROCEDURE — G8484 FLU IMMUNIZE NO ADMIN: HCPCS | Performed by: FAMILY MEDICINE

## 2021-01-26 PROCEDURE — 1090F PRES/ABSN URINE INCON ASSESS: CPT | Performed by: FAMILY MEDICINE

## 2021-01-26 PROCEDURE — 1036F TOBACCO NON-USER: CPT | Performed by: FAMILY MEDICINE

## 2021-01-26 PROCEDURE — G8427 DOCREV CUR MEDS BY ELIG CLIN: HCPCS | Performed by: FAMILY MEDICINE

## 2021-01-26 PROCEDURE — 99213 OFFICE O/P EST LOW 20 MIN: CPT | Performed by: FAMILY MEDICINE

## 2021-01-26 PROCEDURE — G8420 CALC BMI NORM PARAMETERS: HCPCS | Performed by: FAMILY MEDICINE

## 2021-01-26 PROCEDURE — 1123F ACP DISCUSS/DSCN MKR DOCD: CPT | Performed by: FAMILY MEDICINE

## 2021-01-26 ASSESSMENT — ENCOUNTER SYMPTOMS
STRIDOR: 0
EYE REDNESS: 0
APNEA: 0
EYE ITCHING: 0
WHEEZING: 0
BACK PAIN: 0
ABDOMINAL DISTENTION: 0
SINUS PRESSURE: 0
VOMITING: 0
RECTAL PAIN: 0
SORE THROAT: 0
CHEST TIGHTNESS: 0
DIARRHEA: 0
COLOR CHANGE: 0
COUGH: 0
BLOOD IN STOOL: 0
SHORTNESS OF BREATH: 0
EYE DISCHARGE: 0
EYE PAIN: 0
CHOKING: 0
TROUBLE SWALLOWING: 0
CONSTIPATION: 0
NAUSEA: 0
PHOTOPHOBIA: 0
RHINORRHEA: 0

## 2021-01-26 ASSESSMENT — PATIENT HEALTH QUESTIONNAIRE - PHQ9
2. FEELING DOWN, DEPRESSED OR HOPELESS: 0
SUM OF ALL RESPONSES TO PHQ QUESTIONS 1-9: 0
1. LITTLE INTEREST OR PLEASURE IN DOING THINGS: 0
SUM OF ALL RESPONSES TO PHQ9 QUESTIONS 1 & 2: 0

## 2021-01-26 NOTE — PROGRESS NOTES
HPI 81 y/o female pmh hypertension, ckd stage 3, alzheimer's disease who is follow up visit. She is here with daughter   Brie Shepherd (ph  [phone]) care  Giver  Pt lives alone in her apartment  Daughter visits daily  COA involved  PAUL planned 2 times a week    She as lost weight since last visit  Her appetite is down  Question as to whether she is eating  3 meals a day per daughter's comments  Confused at times    Today she is alert  And talkative  She is not agitated  Denies being lightheaded      Wt Readings from Last 3 Encounters:   01/26/21 108 lb 3.2 oz (49.1 kg)   10/20/20 115 lb (52.2 kg)   07/13/20 118 lb (53.5 kg)     Review of Systems   Constitutional: Positive for unexpected weight change. Negative for activity change, appetite change, chills, diaphoresis, fatigue and fever. HENT: Negative for congestion, dental problem, drooling, ear discharge, ear pain, hearing loss, mouth sores, nosebleeds, postnasal drip, rhinorrhea, sinus pressure, sneezing, sore throat, tinnitus and trouble swallowing. Eyes: Negative for photophobia, pain, discharge, redness, itching and visual disturbance. Respiratory: Negative for apnea, cough, choking, chest tightness, shortness of breath, wheezing and stridor. Cardiovascular: Negative for chest pain, palpitations and leg swelling. Gastrointestinal: Negative for abdominal distention, blood in stool, constipation, diarrhea, nausea, rectal pain and vomiting. Genitourinary: Negative for decreased urine volume, difficulty urinating, dyspareunia, dysuria, enuresis, flank pain, frequency, genital sores, hematuria, menstrual problem, pelvic pain, urgency, vaginal bleeding and vaginal pain. Musculoskeletal: Negative for arthralgias, back pain, gait problem, joint swelling, myalgias, neck pain and neck stiffness. Skin: Negative for color change, pallor, rash and wound.    Neurological: Negative for dizziness, tremors, seizures, syncope, facial asymmetry, speech difficulty, weakness, light-headedness, numbness and headaches. Hematological: Negative for adenopathy. Does not bruise/bleed easily. Psychiatric/Behavioral: Negative for agitation, behavioral problems, confusion, decreased concentration, dysphoric mood, hallucinations, self-injury, sleep disturbance and suicidal ideas. The patient is not nervous/anxious and is not hyperactive. Poor memory         Physical Exam  Constitutional:       General: She is not in acute distress. Appearance: She is well-developed. She is not diaphoretic. HENT:      Head: Normocephalic and atraumatic. Right Ear: External ear normal.      Left Ear: External ear normal.      Nose: Nose normal.      Mouth/Throat:      Pharynx: No oropharyngeal exudate. Eyes:      General: No scleral icterus. Left eye: No discharge. Conjunctiva/sclera: Conjunctivae normal.      Pupils: Pupils are equal, round, and reactive to light. Neck:      Musculoskeletal: Normal range of motion and neck supple. Thyroid: No thyromegaly. Vascular: No JVD. Trachea: No tracheal deviation. Cardiovascular:      Rate and Rhythm: Normal rate and regular rhythm. Heart sounds: Normal heart sounds. No murmur. No friction rub. No gallop. Pulmonary:      Effort: Pulmonary effort is normal. No respiratory distress. Breath sounds: Normal breath sounds. No stridor. No wheezing or rales. Chest:      Chest wall: No tenderness. Abdominal:      General: Bowel sounds are normal. There is no distension. Palpations: Abdomen is soft. There is no mass. Tenderness: There is no abdominal tenderness. There is no guarding or rebound. Musculoskeletal: Normal range of motion. General: No tenderness. Lymphadenopathy:      Cervical: No cervical adenopathy. Skin:     General: Skin is warm and dry. Coloration: Skin is not pale. Findings: No erythema or rash.    Neurological:      Mental Status: She is

## 2021-02-15 DIAGNOSIS — G30.1 LATE ONSET ALZHEIMER'S DISEASE WITH BEHAVIORAL DISTURBANCE (HCC): ICD-10-CM

## 2021-02-15 DIAGNOSIS — F02.818 LATE ONSET ALZHEIMER'S DISEASE WITH BEHAVIORAL DISTURBANCE (HCC): ICD-10-CM

## 2021-02-16 RX ORDER — DONEPEZIL HYDROCHLORIDE 5 MG/1
5 TABLET, FILM COATED ORAL NIGHTLY
Qty: 90 TABLET | Refills: 1 | Status: SHIPPED | OUTPATIENT
Start: 2021-02-16

## 2021-02-19 DIAGNOSIS — I95.9 HYPOTENSION, UNSPECIFIED HYPOTENSION TYPE: ICD-10-CM

## 2021-02-19 DIAGNOSIS — R63.4 WEIGHT LOSS: ICD-10-CM

## 2021-02-19 LAB
A/G RATIO: 1.2 (ref 1.1–2.2)
ALBUMIN SERPL-MCNC: 4.1 G/DL (ref 3.4–5)
ALP BLD-CCNC: 95 U/L (ref 40–129)
ALT SERPL-CCNC: 21 U/L (ref 10–40)
ANION GAP SERPL CALCULATED.3IONS-SCNC: 11 MMOL/L (ref 3–16)
AST SERPL-CCNC: 26 U/L (ref 15–37)
BILIRUB SERPL-MCNC: 0.3 MG/DL (ref 0–1)
BUN BLDV-MCNC: 21 MG/DL (ref 7–20)
CALCIUM SERPL-MCNC: 9.8 MG/DL (ref 8.3–10.6)
CHLORIDE BLD-SCNC: 106 MMOL/L (ref 99–110)
CO2: 26 MMOL/L (ref 21–32)
CREAT SERPL-MCNC: 0.7 MG/DL (ref 0.6–1.2)
GFR AFRICAN AMERICAN: >60
GFR NON-AFRICAN AMERICAN: >60
GLOBULIN: 3.4 G/DL
GLUCOSE BLD-MCNC: 79 MG/DL (ref 70–99)
HCT VFR BLD CALC: 37.9 % (ref 36–48)
HEMOGLOBIN: 12.4 G/DL (ref 12–16)
MCH RBC QN AUTO: 27.2 PG (ref 26–34)
MCHC RBC AUTO-ENTMCNC: 32.7 G/DL (ref 31–36)
MCV RBC AUTO: 83.2 FL (ref 80–100)
PDW BLD-RTO: 15.6 % (ref 12.4–15.4)
PLATELET # BLD: 316 K/UL (ref 135–450)
PMV BLD AUTO: 8.7 FL (ref 5–10.5)
POTASSIUM SERPL-SCNC: 5.5 MMOL/L (ref 3.5–5.1)
RBC # BLD: 4.55 M/UL (ref 4–5.2)
SODIUM BLD-SCNC: 143 MMOL/L (ref 136–145)
TOTAL PROTEIN: 7.5 G/DL (ref 6.4–8.2)
TSH SERPL DL<=0.05 MIU/L-ACNC: 1.34 UIU/ML (ref 0.27–4.2)
WBC # BLD: 6.4 K/UL (ref 4–11)

## 2021-02-20 LAB
BILIRUBIN URINE: NEGATIVE
BLOOD, URINE: NEGATIVE
CLARITY: CLEAR
COLOR: YELLOW
GLUCOSE URINE: NEGATIVE MG/DL
KETONES, URINE: NEGATIVE MG/DL
LEUKOCYTE ESTERASE, URINE: NEGATIVE
MICROSCOPIC EXAMINATION: NORMAL
NITRITE, URINE: NEGATIVE
PH UA: 6.5 (ref 5–8)
PROTEIN UA: NEGATIVE MG/DL
SPECIFIC GRAVITY UA: 1.01 (ref 1–1.03)
URINE TYPE: NORMAL
UROBILINOGEN, URINE: 0.2 E.U./DL

## 2021-02-23 ENCOUNTER — OFFICE VISIT (OUTPATIENT)
Dept: PRIMARY CARE CLINIC | Age: 86
End: 2021-02-23
Payer: MEDICARE

## 2021-02-23 VITALS
SYSTOLIC BLOOD PRESSURE: 158 MMHG | HEART RATE: 71 BPM | DIASTOLIC BLOOD PRESSURE: 72 MMHG | BODY MASS INDEX: 19.05 KG/M2 | OXYGEN SATURATION: 96 % | TEMPERATURE: 97.1 F | WEIGHT: 111 LBS

## 2021-02-23 DIAGNOSIS — R41.89 COGNITIVE IMPAIRMENT: ICD-10-CM

## 2021-02-23 DIAGNOSIS — I10 ESSENTIAL HYPERTENSION: Primary | ICD-10-CM

## 2021-02-23 DIAGNOSIS — R63.4 WEIGHT LOSS: ICD-10-CM

## 2021-02-23 PROCEDURE — G8484 FLU IMMUNIZE NO ADMIN: HCPCS | Performed by: FAMILY MEDICINE

## 2021-02-23 PROCEDURE — 99213 OFFICE O/P EST LOW 20 MIN: CPT | Performed by: FAMILY MEDICINE

## 2021-02-23 PROCEDURE — 1090F PRES/ABSN URINE INCON ASSESS: CPT | Performed by: FAMILY MEDICINE

## 2021-02-23 PROCEDURE — G8427 DOCREV CUR MEDS BY ELIG CLIN: HCPCS | Performed by: FAMILY MEDICINE

## 2021-02-23 PROCEDURE — 4040F PNEUMOC VAC/ADMIN/RCVD: CPT | Performed by: FAMILY MEDICINE

## 2021-02-23 PROCEDURE — G8420 CALC BMI NORM PARAMETERS: HCPCS | Performed by: FAMILY MEDICINE

## 2021-02-23 PROCEDURE — 1123F ACP DISCUSS/DSCN MKR DOCD: CPT | Performed by: FAMILY MEDICINE

## 2021-02-23 PROCEDURE — 1036F TOBACCO NON-USER: CPT | Performed by: FAMILY MEDICINE

## 2021-02-23 RX ORDER — AMLODIPINE BESYLATE 10 MG/1
TABLET ORAL
Qty: 90 TABLET | Refills: 3 | Status: SHIPPED | OUTPATIENT
Start: 2021-02-23 | End: 2021-02-23

## 2021-02-23 RX ORDER — MIRTAZAPINE 15 MG/1
15 TABLET, FILM COATED ORAL NIGHTLY
COMMUNITY
Start: 2021-02-15

## 2021-02-23 NOTE — PROGRESS NOTES
HPI 80 y.o. female known cognitive impairment, hypertension, wt loss last visit. Now has been moved  Into daughter's house. Eating better. Wt is up. Blood pressure is improved. [facility]  [facility] and placed on mirtazapine 7.5 mg for sleep  And depression    Her she is pleasant and responsive. Affect is not flat  Sleeping some better      Remains on losartan, remains off hctz and amlodipine      Physical Exam  Constitutional:       General: She is not in acute distress. Appearance: She is well-developed. She is not diaphoretic. HENT:      Head: Normocephalic and atraumatic. Right Ear: External ear normal.      Left Ear: External ear normal.      Nose: Nose normal.      Mouth/Throat:      Pharynx: No oropharyngeal exudate. Eyes:      General: No scleral icterus. Left eye: No discharge. Conjunctiva/sclera: Conjunctivae normal.      Pupils: Pupils are equal, round, and reactive to light. Neck:      Musculoskeletal: Normal range of motion and neck supple. Thyroid: No thyromegaly. Vascular: No JVD. Trachea: No tracheal deviation. Cardiovascular:      Rate and Rhythm: Normal rate and regular rhythm. Heart sounds: Normal heart sounds. No murmur. No friction rub. No gallop. Pulmonary:      Effort: Pulmonary effort is normal. No respiratory distress. Breath sounds: Normal breath sounds. No stridor. No wheezing or rales. Chest:      Chest wall: No tenderness. Abdominal:      General: Bowel sounds are normal. There is no distension. Palpations: Abdomen is soft. There is no mass. Tenderness: There is no abdominal tenderness. There is no guarding or rebound. Musculoskeletal: Normal range of motion. General: No tenderness. Lymphadenopathy:      Cervical: No cervical adenopathy. Skin:     General: Skin is warm and dry. Coloration: Skin is not pale. Findings: No erythema or rash.    Neurological:      Mental Status: She is alert and oriented to person, place, and time. Cranial Nerves: No cranial nerve deficit. Coordination: Coordination normal.      Deep Tendon Reflexes: Reflexes are normal and symmetric. Reflexes normal.   Psychiatric:         Behavior: Behavior normal.         Thought Content: Thought content normal.         Judgment: Judgment normal.         1. Essential hypertension  Restart amlodipine 10 mg 1/2 tab a day  Joann bp 4 weeks    2. Weight loss  Improved since last visit    3.  Cognitive impairment  Stable  Now lives with daughter   ZAINA dodson in 4 weeks

## 2021-03-20 DIAGNOSIS — I10 ESSENTIAL HYPERTENSION: ICD-10-CM

## 2021-03-22 RX ORDER — LOSARTAN POTASSIUM 100 MG/1
TABLET ORAL
Qty: 90 TABLET | Refills: 1 | Status: ON HOLD | OUTPATIENT
Start: 2021-03-22 | End: 2021-10-05 | Stop reason: HOSPADM

## 2021-03-22 NOTE — TELEPHONE ENCOUNTER
Medication:   Requested Prescriptions     Pending Prescriptions Disp Refills    losartan (COZAAR) 100 MG tablet 90 tablet 1     Sig: TAKE ONE TABLET BY MOUTH ONCE DAILY        Last Filled:      Patient Phone Number: [phone] (home)     Last appt: 2/23/2021   Next appt: 3/23/2021    Last OARRS: No flowsheet data found.

## 2021-04-12 ENCOUNTER — OFFICE VISIT (OUTPATIENT)
Dept: ENT CLINIC | Age: 86
End: 2021-04-12
Payer: MEDICARE

## 2021-04-12 VITALS
DIASTOLIC BLOOD PRESSURE: 69 MMHG | SYSTOLIC BLOOD PRESSURE: 135 MMHG | HEIGHT: 64 IN | RESPIRATION RATE: 14 BRPM | HEART RATE: 69 BPM | WEIGHT: 111 LBS | BODY MASS INDEX: 18.95 KG/M2 | TEMPERATURE: 96.8 F

## 2021-04-12 DIAGNOSIS — H61.23 BILATERAL IMPACTED CERUMEN: ICD-10-CM

## 2021-04-12 DIAGNOSIS — H91.93 BILATERAL HEARING LOSS, UNSPECIFIED HEARING LOSS TYPE: Primary | ICD-10-CM

## 2021-04-12 PROCEDURE — 99202 OFFICE O/P NEW SF 15 MIN: CPT | Performed by: OTOLARYNGOLOGY

## 2021-04-12 PROCEDURE — 4040F PNEUMOC VAC/ADMIN/RCVD: CPT | Performed by: OTOLARYNGOLOGY

## 2021-04-12 PROCEDURE — G8427 DOCREV CUR MEDS BY ELIG CLIN: HCPCS | Performed by: OTOLARYNGOLOGY

## 2021-04-12 PROCEDURE — 1123F ACP DISCUSS/DSCN MKR DOCD: CPT | Performed by: OTOLARYNGOLOGY

## 2021-04-12 PROCEDURE — G8420 CALC BMI NORM PARAMETERS: HCPCS | Performed by: OTOLARYNGOLOGY

## 2021-04-12 PROCEDURE — 1090F PRES/ABSN URINE INCON ASSESS: CPT | Performed by: OTOLARYNGOLOGY

## 2021-04-12 PROCEDURE — 1036F TOBACCO NON-USER: CPT | Performed by: OTOLARYNGOLOGY

## 2021-04-12 RX ORDER — PHENOL 1.4 %
AEROSOL, SPRAY (ML) MUCOUS MEMBRANE EVERY EVENING
COMMUNITY
Start: 2021-04-06 | End: 2021-09-29 | Stop reason: ALTCHOICE

## 2021-04-12 RX ORDER — AMLODIPINE BESYLATE 5 MG/1
5 TABLET ORAL DAILY
Status: ON HOLD | COMMUNITY
Start: 2021-03-23 | End: 2021-10-05 | Stop reason: HOSPADM

## 2021-04-12 RX ORDER — DENOSUMAB 60 MG/ML
60 INJECTION SUBCUTANEOUS
COMMUNITY
End: 2021-09-29 | Stop reason: ALTCHOICE

## 2021-04-12 ASSESSMENT — ENCOUNTER SYMPTOMS
SORE THROAT: 0
SINUS PAIN: 0
RHINORRHEA: 0

## 2021-04-12 NOTE — PROGRESS NOTES
[facility]       NEW PATIENT VISIT      PCP:  Yesenia Estrada MD      [address] [facility]  Chief Complaint   Patient presents with    Cerumen Impaction    Hearing Loss       HISTORY  N Marino Chappell is a 80 y.o. female here for evaluation and treatment of hearing loss in both ears for many years. REVIEW OF SYSTEMS   Review of Systems   Constitutional: Negative for chills and fever. HENT: Positive for hearing loss. Negative for ear discharge, ear pain, rhinorrhea, sinus pain, sore throat and tinnitus. PAST MEDICAL HISTORY    Past Medical History:   Diagnosis Date    Chronic back pain     CKD (chronic kidney disease) stage 3, GFR 30-59 ml/min     Hypertension        Past Surgical History:   Procedure Laterality Date    HERNIA REPAIR Left 4/2/2020    REPAIR OF INCARCERATED LEFT INGUINAL HERNIA WITH  MESH performed by Cleo Martinez MD at [facility] [address] [place], TOTAL ABDOMINAL      TOE SURGERY           EXAMINATION      Vitals:    04/12/21 1428   BP: 135/69   Pulse: 69   Resp: 14   Temp: 96.8 °F (36 °C)   Weight: 111 lb (50.3 kg)   Height: 5' 4\" (1.626 m)       Physical Exam  Vitals signs reviewed. Constitutional:       General: She is awake. She is not in acute distress. Appearance: Normal appearance. She is well-developed. She is not ill-appearing or toxic-appearing. HENT:      Head: Normocephalic and atraumatic. Salivary Glands: Right salivary gland is not diffusely enlarged or tender. Left salivary gland is not diffusely enlarged or tender. Right Ear: Tympanic membrane, ear canal and external ear normal.      Left Ear: Tympanic membrane, ear canal and external ear normal.      Ears:      Comments: Bilateral otomicroscopy performed. Cerumen impaction removed bilaterally with wire loop.        Nose: Nose normal. No nasal deformity, septal deviation, mucosal edema, congestion or rhinorrhea. Right Turbinates: Not enlarged. Left Turbinates: Not enlarged. Right Sinus: No maxillary sinus tenderness or frontal sinus tenderness. Left Sinus: No maxillary sinus tenderness or frontal sinus tenderness. Mouth/Throat:      Lips: Pink. No lesions. Mouth: Mucous membranes are moist. No oral lesions. Tongue: No lesions. Palate: No mass and lesions. Pharynx: Oropharynx is clear. Uvula midline. No oropharyngeal exudate or posterior oropharyngeal erythema. Tonsils: No tonsillar exudate or tonsillar abscesses. Neck:      Musculoskeletal: Normal range of motion and neck supple. No neck rigidity or muscular tenderness. Thyroid: No thyroid mass, thyromegaly or thyroid tenderness. Trachea: No tracheal deviation. Lymphadenopathy:      Cervical: No cervical adenopathy. Neurological:      Mental Status: She is alert. Teresa Douglas / Leonid Reich / Yokasta Stevens was seen today for cerumen impaction and hearing loss. Diagnoses and all orders for this visit:    Bilateral hearing loss, unspecified hearing loss type  -     External Referral To Audiology    Bilateral impacted cerumen         RECOMMENDATIONS/PLAN      1. Audiogram.    2. Will advise patient of results of audiogram.  3. Return for any further ENT or sinus problems or symptoms.           MEDICAL DECISION MAKING  # and complexity of problems addressed:  80959 - Moderate  1 undiagnosed new problem with uncertain prognosis    Amount and/or Complexity of Data to be Reviewed and Analyzed  34042 - Straightforward  no data or only 1 test or document reviewed or ordered     Risk of Complications and /or Morbidity or Mortality of Patient Management  21910 - Minimal

## 2021-09-29 ENCOUNTER — APPOINTMENT (OUTPATIENT)
Dept: GENERAL RADIOLOGY | Age: 86
DRG: 378 | End: 2021-09-29
Payer: MEDICARE

## 2021-09-29 ENCOUNTER — APPOINTMENT (OUTPATIENT)
Dept: CT IMAGING | Age: 86
DRG: 378 | End: 2021-09-29
Payer: MEDICARE

## 2021-09-29 ENCOUNTER — HOSPITAL ENCOUNTER (INPATIENT)
Age: 86
LOS: 8 days | Discharge: HOME HEALTH | DRG: 378 | End: 2021-10-07
Attending: EMERGENCY MEDICINE | Admitting: HOSPITALIST
Payer: MEDICARE

## 2021-09-29 DIAGNOSIS — K57.91 GASTROINTESTINAL HEMORRHAGE ASSOCIATED WITH INTESTINAL DIVERTICULOSIS: ICD-10-CM

## 2021-09-29 DIAGNOSIS — I10 ESSENTIAL HYPERTENSION: ICD-10-CM

## 2021-09-29 DIAGNOSIS — K92.1 HEMATOCHEZIA: Primary | ICD-10-CM

## 2021-09-29 PROBLEM — K92.2 GI BLEED: Status: ACTIVE | Noted: 2021-09-29

## 2021-09-29 LAB
A/G RATIO: 1.2 (ref 1.1–2.2)
ABO/RH: NORMAL
ALBUMIN SERPL-MCNC: 4.2 G/DL (ref 3.4–5)
ALP BLD-CCNC: 72 U/L (ref 40–129)
ALT SERPL-CCNC: 25 U/L (ref 10–40)
ANION GAP SERPL CALCULATED.3IONS-SCNC: 11 MMOL/L (ref 3–16)
ANTIBODY SCREEN: NORMAL
APTT: 20.4 SEC (ref 26.2–38.6)
AST SERPL-CCNC: 42 U/L (ref 15–37)
BASOPHILS ABSOLUTE: 0.1 K/UL (ref 0–0.2)
BASOPHILS RELATIVE PERCENT: 1 %
BILIRUB SERPL-MCNC: 0.4 MG/DL (ref 0–1)
BILIRUBIN URINE: NEGATIVE
BLOOD, URINE: ABNORMAL
BUN BLDV-MCNC: 18 MG/DL (ref 7–20)
CALCIUM SERPL-MCNC: 9.1 MG/DL (ref 8.3–10.6)
CHLORIDE BLD-SCNC: 105 MMOL/L (ref 99–110)
CLARITY: CLEAR
CO2: 23 MMOL/L (ref 21–32)
COLOR: YELLOW
CREAT SERPL-MCNC: 0.7 MG/DL (ref 0.6–1.2)
EOSINOPHILS ABSOLUTE: 0.1 K/UL (ref 0–0.6)
EOSINOPHILS RELATIVE PERCENT: 1 %
EPITHELIAL CELLS, UA: 1 /HPF (ref 0–5)
GFR AFRICAN AMERICAN: >60
GFR NON-AFRICAN AMERICAN: >60
GLOBULIN: 3.6 G/DL
GLUCOSE BLD-MCNC: 99 MG/DL (ref 70–99)
GLUCOSE URINE: NEGATIVE MG/DL
HCT VFR BLD CALC: 29.8 % (ref 36–48)
HCT VFR BLD CALC: 37 % (ref 36–48)
HEMOGLOBIN: 12.1 G/DL (ref 12–16)
HEMOGLOBIN: 9.8 G/DL (ref 12–16)
HYALINE CASTS: 0 /LPF (ref 0–8)
INR BLD: 1.02 (ref 0.88–1.12)
KETONES, URINE: NEGATIVE MG/DL
LEUKOCYTE ESTERASE, URINE: ABNORMAL
LYMPHOCYTES ABSOLUTE: 2.1 K/UL (ref 1–5.1)
LYMPHOCYTES RELATIVE PERCENT: 29.4 %
MCH RBC QN AUTO: 27.2 PG (ref 26–34)
MCHC RBC AUTO-ENTMCNC: 32.6 G/DL (ref 31–36)
MCV RBC AUTO: 83.3 FL (ref 80–100)
MICROSCOPIC EXAMINATION: YES
MONOCYTES ABSOLUTE: 0.3 K/UL (ref 0–1.3)
MONOCYTES RELATIVE PERCENT: 4.1 %
NEUTROPHILS ABSOLUTE: 4.7 K/UL (ref 1.7–7.7)
NEUTROPHILS RELATIVE PERCENT: 64.5 %
NITRITE, URINE: NEGATIVE
PDW BLD-RTO: 16.3 % (ref 12.4–15.4)
PH UA: 5.5 (ref 5–8)
PLATELET # BLD: 193 K/UL (ref 135–450)
PMV BLD AUTO: 8.5 FL (ref 5–10.5)
POTASSIUM REFLEX MAGNESIUM: 4.6 MMOL/L (ref 3.5–5.1)
PROTEIN UA: NEGATIVE MG/DL
PROTHROMBIN TIME: 11.5 SEC (ref 9.9–12.7)
RBC # BLD: 4.45 M/UL (ref 4–5.2)
RBC UA: 1 /HPF (ref 0–4)
SODIUM BLD-SCNC: 139 MMOL/L (ref 136–145)
SPECIFIC GRAVITY UA: >1.03 (ref 1–1.03)
TOTAL PROTEIN: 7.8 G/DL (ref 6.4–8.2)
URINE TYPE: ABNORMAL
UROBILINOGEN, URINE: 0.2 E.U./DL
WBC # BLD: 7.2 K/UL (ref 4–11)
WBC UA: 33 /HPF (ref 0–5)

## 2021-09-29 PROCEDURE — 85014 HEMATOCRIT: CPT

## 2021-09-29 PROCEDURE — 85730 THROMBOPLASTIN TIME PARTIAL: CPT

## 2021-09-29 PROCEDURE — 36556 INSERT NON-TUNNEL CV CATH: CPT

## 2021-09-29 PROCEDURE — 36430 TRANSFUSION BLD/BLD COMPNT: CPT

## 2021-09-29 PROCEDURE — C9113 INJ PANTOPRAZOLE SODIUM, VIA: HCPCS | Performed by: INTERNAL MEDICINE

## 2021-09-29 PROCEDURE — 6360000002 HC RX W HCPCS: Performed by: INTERNAL MEDICINE

## 2021-09-29 PROCEDURE — 81001 URINALYSIS AUTO W/SCOPE: CPT

## 2021-09-29 PROCEDURE — 2580000003 HC RX 258: Performed by: HOSPITALIST

## 2021-09-29 PROCEDURE — 86850 RBC ANTIBODY SCREEN: CPT

## 2021-09-29 PROCEDURE — 36556 INSERT NON-TUNNEL CV CATH: CPT | Performed by: INTERNAL MEDICINE

## 2021-09-29 PROCEDURE — 80053 COMPREHEN METABOLIC PANEL: CPT

## 2021-09-29 PROCEDURE — 6360000004 HC RX CONTRAST MEDICATION: Performed by: PHYSICIAN ASSISTANT

## 2021-09-29 PROCEDURE — 74174 CTA ABD&PLVS W/CONTRAST: CPT

## 2021-09-29 PROCEDURE — 6370000000 HC RX 637 (ALT 250 FOR IP): Performed by: HOSPITALIST

## 2021-09-29 PROCEDURE — 6360000004 HC RX CONTRAST MEDICATION: Performed by: INTERNAL MEDICINE

## 2021-09-29 PROCEDURE — 2000000000 HC ICU R&B

## 2021-09-29 PROCEDURE — 86901 BLOOD TYPING SEROLOGIC RH(D): CPT

## 2021-09-29 PROCEDURE — 71045 X-RAY EXAM CHEST 1 VIEW: CPT

## 2021-09-29 PROCEDURE — 36415 COLL VENOUS BLD VENIPUNCTURE: CPT

## 2021-09-29 PROCEDURE — 85025 COMPLETE CBC W/AUTO DIFF WBC: CPT

## 2021-09-29 PROCEDURE — 85610 PROTHROMBIN TIME: CPT

## 2021-09-29 PROCEDURE — 85018 HEMOGLOBIN: CPT

## 2021-09-29 PROCEDURE — 06HY33Z INSERTION OF INFUSION DEVICE INTO LOWER VEIN, PERCUTANEOUS APPROACH: ICD-10-PCS | Performed by: INTERNAL MEDICINE

## 2021-09-29 PROCEDURE — 6360000002 HC RX W HCPCS: Performed by: HOSPITALIST

## 2021-09-29 PROCEDURE — 2580000003 HC RX 258: Performed by: INTERNAL MEDICINE

## 2021-09-29 PROCEDURE — 2580000003 HC RX 258: Performed by: PHYSICIAN ASSISTANT

## 2021-09-29 PROCEDURE — P9016 RBC LEUKOCYTES REDUCED: HCPCS

## 2021-09-29 PROCEDURE — 86900 BLOOD TYPING SEROLOGIC ABO: CPT

## 2021-09-29 PROCEDURE — 86923 COMPATIBILITY TEST ELECTRIC: CPT

## 2021-09-29 PROCEDURE — 99284 EMERGENCY DEPT VISIT MOD MDM: CPT

## 2021-09-29 RX ORDER — SODIUM CHLORIDE 9 MG/ML
INJECTION, SOLUTION INTRAVENOUS CONTINUOUS
Status: DISCONTINUED | OUTPATIENT
Start: 2021-09-29 | End: 2021-09-29

## 2021-09-29 RX ORDER — SODIUM CHLORIDE 9 MG/ML
25 INJECTION, SOLUTION INTRAVENOUS PRN
Status: DISCONTINUED | OUTPATIENT
Start: 2021-09-29 | End: 2021-10-07 | Stop reason: HOSPADM

## 2021-09-29 RX ORDER — METOPROLOL SUCCINATE 50 MG/1
50 TABLET, EXTENDED RELEASE ORAL DAILY
Status: DISCONTINUED | OUTPATIENT
Start: 2021-09-29 | End: 2021-10-07 | Stop reason: HOSPADM

## 2021-09-29 RX ORDER — QUETIAPINE FUMARATE 25 MG/1
25 TABLET, FILM COATED ORAL NIGHTLY
COMMUNITY

## 2021-09-29 RX ORDER — SODIUM CHLORIDE 9 MG/ML
INJECTION, SOLUTION INTRAVENOUS CONTINUOUS PRN
Status: COMPLETED | OUTPATIENT
Start: 2021-09-29 | End: 2021-09-29

## 2021-09-29 RX ORDER — MAGNESIUM SULFATE 1 G/100ML
1000 INJECTION INTRAVENOUS PRN
Status: DISCONTINUED | OUTPATIENT
Start: 2021-09-29 | End: 2021-10-07 | Stop reason: HOSPADM

## 2021-09-29 RX ORDER — ONDANSETRON 4 MG/1
4 TABLET, ORALLY DISINTEGRATING ORAL EVERY 8 HOURS PRN
Status: DISCONTINUED | OUTPATIENT
Start: 2021-09-29 | End: 2021-10-07 | Stop reason: HOSPADM

## 2021-09-29 RX ORDER — PANTOPRAZOLE SODIUM 40 MG/1
40 TABLET, DELAYED RELEASE ORAL
Status: DISCONTINUED | OUTPATIENT
Start: 2021-10-02 | End: 2021-10-07 | Stop reason: HOSPADM

## 2021-09-29 RX ORDER — MELOXICAM 7.5 MG/1
7.5 TABLET ORAL DAILY PRN
Status: ON HOLD | COMMUNITY
End: 2021-10-05 | Stop reason: HOSPADM

## 2021-09-29 RX ORDER — ACETAMINOPHEN 650 MG/1
650 SUPPOSITORY RECTAL EVERY 6 HOURS PRN
Status: DISCONTINUED | OUTPATIENT
Start: 2021-09-29 | End: 2021-10-07 | Stop reason: HOSPADM

## 2021-09-29 RX ORDER — 0.9 % SODIUM CHLORIDE 0.9 %
500 INTRAVENOUS SOLUTION INTRAVENOUS ONCE
Status: COMPLETED | OUTPATIENT
Start: 2021-09-29 | End: 2021-09-29

## 2021-09-29 RX ORDER — SODIUM CHLORIDE, SODIUM LACTATE, POTASSIUM CHLORIDE, CALCIUM CHLORIDE 600; 310; 30; 20 MG/100ML; MG/100ML; MG/100ML; MG/100ML
INJECTION, SOLUTION INTRAVENOUS CONTINUOUS
Status: DISCONTINUED | OUTPATIENT
Start: 2021-09-29 | End: 2021-10-01

## 2021-09-29 RX ORDER — ONDANSETRON 2 MG/ML
4 INJECTION INTRAMUSCULAR; INTRAVENOUS EVERY 6 HOURS PRN
Status: DISCONTINUED | OUTPATIENT
Start: 2021-09-29 | End: 2021-10-07 | Stop reason: HOSPADM

## 2021-09-29 RX ORDER — POLYETHYLENE GLYCOL 3350 17 G/17G
17 POWDER, FOR SOLUTION ORAL DAILY PRN
Status: DISCONTINUED | OUTPATIENT
Start: 2021-09-29 | End: 2021-10-07 | Stop reason: HOSPADM

## 2021-09-29 RX ORDER — ACETAMINOPHEN 325 MG/1
650 TABLET ORAL EVERY 6 HOURS PRN
Status: DISCONTINUED | OUTPATIENT
Start: 2021-09-29 | End: 2021-10-07 | Stop reason: HOSPADM

## 2021-09-29 RX ORDER — SODIUM CHLORIDE 0.9 % (FLUSH) 0.9 %
5-40 SYRINGE (ML) INJECTION EVERY 12 HOURS SCHEDULED
Status: DISCONTINUED | OUTPATIENT
Start: 2021-09-29 | End: 2021-10-07 | Stop reason: HOSPADM

## 2021-09-29 RX ORDER — POTASSIUM CHLORIDE 29.8 MG/ML
20 INJECTION INTRAVENOUS PRN
Status: DISCONTINUED | OUTPATIENT
Start: 2021-09-29 | End: 2021-10-07 | Stop reason: HOSPADM

## 2021-09-29 RX ORDER — SODIUM CHLORIDE 0.9 % (FLUSH) 0.9 %
5-40 SYRINGE (ML) INJECTION PRN
Status: DISCONTINUED | OUTPATIENT
Start: 2021-09-29 | End: 2021-10-07 | Stop reason: HOSPADM

## 2021-09-29 RX ADMIN — SODIUM CHLORIDE 80 MG: 9 INJECTION, SOLUTION INTRAVENOUS at 23:04

## 2021-09-29 RX ADMIN — IOPAMIDOL 100 ML: 755 INJECTION, SOLUTION INTRAVENOUS at 20:30

## 2021-09-29 RX ADMIN — SODIUM CHLORIDE 150 ML/HR: 9 INJECTION, SOLUTION INTRAVENOUS at 20:10

## 2021-09-29 RX ADMIN — SODIUM CHLORIDE 8 MG/HR: 9 INJECTION, SOLUTION INTRAVENOUS at 23:06

## 2021-09-29 RX ADMIN — SODIUM CHLORIDE: 9 INJECTION, SOLUTION INTRAVENOUS at 17:22

## 2021-09-29 RX ADMIN — Medication 10 ML: at 20:50

## 2021-09-29 RX ADMIN — SODIUM CHLORIDE, POTASSIUM CHLORIDE, SODIUM LACTATE AND CALCIUM CHLORIDE: 600; 310; 30; 20 INJECTION, SOLUTION INTRAVENOUS at 23:05

## 2021-09-29 RX ADMIN — ONDANSETRON 4 MG: 2 INJECTION INTRAMUSCULAR; INTRAVENOUS at 23:37

## 2021-09-29 RX ADMIN — IOPAMIDOL 75 ML: 755 INJECTION, SOLUTION INTRAVENOUS at 14:43

## 2021-09-29 RX ADMIN — SODIUM CHLORIDE 500 ML: 9 INJECTION, SOLUTION INTRAVENOUS at 14:15

## 2021-09-29 RX ADMIN — METOPROLOL SUCCINATE 50 MG: 50 TABLET, EXTENDED RELEASE ORAL at 18:27

## 2021-09-29 ASSESSMENT — PAIN SCALES - GENERAL
PAINLEVEL_OUTOF10: 0

## 2021-09-29 NOTE — ED PROVIDER NOTES
As physician-in-triage, I performed a medical screening history and physical exam on [facility]. I also cared for and evaluated the patient in conjunction with the ED Advanced Practice Provider. All diagnostic, treatment, and disposition decisions were made by myself in conjunction with the advanced practice provider. For all further details of the patient's emergency department visit, please see the advanced practice provider's documentation. Patient presents ER for evaluation of rectal bleeding hematochezia with multiple bloody stools, her BUN is normal her hemoglobin is 12, she is on no anticoagulation she had multiple bloody stools. Source is likely diverticulosis. Hemodynamically stable this point time. She will be admitted for H&H monitoring. Case discussed with admitting medicine physician.     Impression: Acute GI bleed, hematochezia, diverticulosis     Tone Blount MD  34/56/11 1548       Tone Blount MD  53/04/62 9461

## 2021-09-29 NOTE — ED NOTES
Pt depends changed at this time, moderate amount of blood with clots noted, BJ in to assess, no blood occult done at this time due to visible blood in stool sample.       [address], RN  09/29/21 2037

## 2021-09-29 NOTE — ED PROVIDER NOTES
[facility]        Pt Name: Marrianne Dubin  MRN: [id number]  [place] 5/23/1933  Date of evaluation: 9/29/2021  Provider: Trenton Basurto PA-C  PCP: Felice Youngblood MD  Note Started: 1:22 PM EDT        I have seen and evaluated this patient with my supervising physician Pradeep Piedra. The total critical care time spent while evaluating and treating this patient was at least 33 minutes. This excludes time spent doing separately billable procedures. This includes time at the bedside, data interpretation, medication management, obtaining critical history from collateral sources if the patient is unable to provide it directly, and physician consultation. Specifics of interventions taken and potentially life-threatening diagnostic considerations are listed above in the medical decision making. CHIEF COMPLAINT       Chief Complaint   Patient presents with    GI Bleeding     at adult  and they called and told her that she had clots, pt takes aspirin,depends saturated       HISTORY OF PRESENT ILLNESS   (Location, Timing/Onset, Context/Setting, Quality, Duration, Modifying Factors, Severity, Associated Signs and Symptoms)  Note limiting factors. Chief Complaint: GI bleed    Marrianne Dubin is a 80 y.o. female who presents to the emergency department with her daughter with GI bleed. Patient has history of dementia and is often nonverbal.  Daughter states she dropped her off at her adult  and then was called when she had a bowel movement that just showed bright red blood in the stool. She has had a little bit of bright red blood in her depends. She is on aspirin but denies any other anticoagulants. Daughter states that she ate fine today and there is been no vomiting, fevers. Has history of surgery for small bowel obstruction and hernia.   There is been no decreased urination, hematuria, difficulty breathing or any other symptoms noted by daughter at home.    Nursing Notes were all reviewed and agreed with or any disagreements were addressed in the HPI. REVIEW OF SYSTEMS    (2-9 systems for level 4, 10 or more for level 5)     Review of Systems    Positives and Pertinent negatives as per HPI. Except as noted above in the ROS, all other systems were reviewed and negative. PAST MEDICAL HISTORY     Past Medical History:   Diagnosis Date    Chronic back pain     CKD (chronic kidney disease) stage 3, GFR 30-59 ml/min ([facility])     Dementia ([facility] [address]     often non-verbal    Hypertension          SURGICAL HISTORY     Past Surgical History:   Procedure Laterality Date    HERNIA REPAIR Left 4/2/2020    REPAIR OF INCARCERATED LEFT INGUINAL HERNIA WITH  MESH performed by Shannon Ramirez MD at [address] [place], [address] [facility]       Current Discharge Medication List      CONTINUE these medications which have NOT CHANGED    Details   QUEtiapine (SEROQUEL) 25 MG tablet Take 25 mg by mouth nightly       meloxicam (MOBIC) 7.5 MG tablet Take 7.5 mg by mouth daily as needed       metoprolol succinate (TOPROL XL) 50 MG extended release tablet Take 1 tablet by mouth twice daily  Qty: 180 tablet, Refills: 5    Associated Diagnoses: Essential hypertension      amLODIPine (NORVASC) 5 MG tablet Take 5 mg by mouth daily       losartan (COZAAR) 100 MG tablet TAKE ONE TABLET BY MOUTH ONCE DAILY  Qty: 90 tablet, Refills: 1    Associated Diagnoses: Essential hypertension      donepezil (ARICEPT) 5 MG tablet Take 1 tablet by mouth nightly  Qty: 90 tablet, Refills: 1    Associated Diagnoses: Late onset Alzheimer's disease with behavioral disturbance (HCC)      aspirin 81 MG tablet Take 81 mg by mouth daily      Multiple Vitamin (MULTIVITAMINS PO) Take 1 tablet by mouth daily       mirtazapine (REMERON) 15 MG tablet Take 15 mg by mouth nightly                ALLERGIES     Patient has no known allergies.     FAMILYHISTORY Family History   Problem Relation Age of Onset    Diabetes Mother     Diabetes Sister           SOCIAL HISTORY       Social History     Tobacco Use    Smoking status: Never Smoker    Smokeless tobacco: Never Used   Substance Use Topics    Alcohol use: No    Drug use: No       SCREENINGS    [place] Coma Scale  Eye Opening: Spontaneous  Best Verbal Response: Oriented  Best Motor Response: Obeys commands  [place] Coma Scale Score: 15        PHYSICAL EXAM    (up to 7 for level 4, 8 or more for level 5)     ED Triage Vitals [09/29/21 1305]   BP Temp Temp Source Pulse Resp SpO2 Height Weight   (!) 182/76 99.2 °F (37.3 °C) Oral 67 18 98 % -- 122 lb (55.3 kg)       Physical Exam  Vitals and nursing note reviewed. Constitutional:       Appearance: She is well-developed. She is not diaphoretic. HENT:      Head: Atraumatic. Nose: Nose normal.   Eyes:      General:         Right eye: No discharge. Left eye: No discharge. Cardiovascular:      Rate and Rhythm: Normal rate and regular rhythm. Heart sounds: No murmur heard. No friction rub. No gallop. Pulmonary:      Effort: Pulmonary effort is normal. No respiratory distress. Breath sounds: No stridor. No wheezing, rhonchi or rales. Abdominal:      General: Bowel sounds are normal. There is no distension. Palpations: Abdomen is soft. There is no mass. Tenderness: There is no abdominal tenderness. There is no guarding or rebound. Hernia: No hernia is present. Genitourinary:     Comments: Rectal exam performed female chaperone at bedside reveals dark blood with some clots in the depends with some blood noted around the anus and rectum. No masses. No obvious vaginal bleeding noted. Musculoskeletal:         General: No swelling. Normal range of motion. Cervical back: Normal range of motion. Skin:     General: Skin is warm and dry. Findings: No erythema or rash.    Neurological:      Mental Status: She is alert and oriented to person, place, and time. Cranial Nerves: No cranial nerve deficit.    Psychiatric:         Behavior: Behavior normal.         DIAGNOSTIC RESULTS   LABS:    Labs Reviewed   CBC WITH AUTO DIFFERENTIAL - Abnormal; Notable for the following components:       Result Value    RDW 16.3 (*)     All other components within normal limits    Narrative:     Performed at:  [facility] [address] [place]   Phone [phone]   COMPREHENSIVE METABOLIC PANEL W/ REFLEX TO MG FOR LOW K - Abnormal; Notable for the following components:    AST 42 (*)     All other components within normal limits    Narrative:     Performed at:  [facility] [address]   Phone [phone]   APTT - Abnormal; Notable for the following components:    aPTT 20.4 (*)     All other components within normal limits    Narrative:     Performed at:  [facility] [address]   Phone [phone]   HEMOGLOBIN AND HEMATOCRIT, BLOOD - Abnormal; Notable for the following components:    Hemoglobin 9.8 (*)     Hematocrit 29.8 (*)     All other components within normal limits    Narrative:     Performed at:  [facility] [address]   Phone [phone]   COMPREHENSIVE METABOLIC PANEL - Abnormal; Notable for the following components:    Chloride 112 (*)     Glucose 115 (*)     Calcium 7.7 (*)     Total Protein 5.6 (*)     Albumin 3.2 (*)     All other components within normal limits    Narrative:     Performed at:  [facility] [address]   Phone [phone]   URINALYSIS - Abnormal; Notable for the following components:    Blood, Urine TRACE (*)     Leukocyte Esterase, Urine MODERATE (*)     All other components within normal limits    Narrative: Performed at:  [facility] [address] [place], [place]   Phone [phone]   MICROSCOPIC URINALYSIS - Abnormal; Notable for the following components:    WBC, UA 33 (*)     All other components within normal limits    Narrative:     Performed at:  [facility] [address] [place], [place]   Phone [phone]   HEMOGLOBIN AND HEMATOCRIT, BLOOD - Abnormal; Notable for the following components:    Hemoglobin 8.3 (*)     Hematocrit 25.2 (*)     All other components within normal limits    Narrative:     Q6 per RN  Collection has been rescheduled by [facility] EMERGENCY DEPARTMENT AT [facility] at 09/30/2021 01:25 Reason: Per   rn  Performed at:  [facility] [address] [place], [place]   Phone [phone]   POCT VENOUS - Abnormal; Notable for the following components:    POC Glucose 137 (*)     pH, [place] 7.298 (*)     Hemoglobin 7.9 (*)     POC Hematocrit 23.0 (*)     All other components within normal limits    Narrative:     Performed at:  [facility] [address] [place], [place]   Phone [phone]   PROTIME-INR    Narrative:     Performed at:  [facility] [address] [place], [place]   Phone [phone]   MAGNESIUM    Narrative:     Performed at:  [facility] [address], [place]   Phone [phone]   PHOSPHORUS    Narrative:     Performed at:  [facility] [address], [place]   Phone [phone]   BLOOD OCCULT STOOL DIAGNOSTIC   CBC   HEMOGLOBIN AND HEMATOCRIT, BLOOD   HEMOGLOBIN AND HEMATOCRIT, BLOOD   HEMOGLOBIN AND HEMATOCRIT, BLOOD   TYPE AND SCREEN    Narrative:     Performed at:  [facility] [address], [place]   Phone [phone]       When ordered only abnormal lab results are displayed. All other labs were within normal range or not returned as of this dictation. EKG: When ordered, EKG's are interpreted by the Emergency Department Physician in the absence of a cardiologist.  Please see their note for interpretation of EKG. RADIOLOGY:   Non-plain film images such as CT, Ultrasound and MRI are read by the radiologist. Plain radiographic images are visualized and preliminarily interpreted by the ED Provider with the below findings:        Interpretation per the Radiologist below, if available at the time of this note:    XR CHEST PORTABLE   Final Result   NG tube courses into the stomach and acceptable. Hyperinflated lungs and old granulomatous disease are stable. XR CHEST PORTABLE   Final Result   1. Hyperinflated lungs with old granulomatous disease. 2.  NG tube tip is near the GE junction and should be advanced by 10-15 cm. CTA ABDOMEN PELVIS W WO CONTRAST   Final Result   Unremarkable CT angiogram of the abdomen and pelvis. Patent mesenteric   arteries and veins. No findings of acute or chronic mesenteric ischemia. No   active gastrointestinal bleed. Extensive colonic diverticulosis is again noted, without evidence of acute   diverticulitis. Cholelithiasis. CTA ABDOMEN PELVIS W WO CONTRAST   Final Result   Motion limited study. No CT evidence of acute intra-abdominal process. No evidence of active GI   bleeding. Colonic diverticulosis with large amount stool throughout the colon. Severe atherosclerosis. No results found.         PROCEDURES   Unless otherwise noted below, none     Procedures    CRITICAL CARE TIME   N/A    CONSULTS:  IP CONSULT TO SOCIAL WORK  IP CONSULT TO GI      EMERGENCY DEPARTMENT COURSE and DIFFERENTIAL DIAGNOSIS/MDM:   Vitals:    Vitals:    09/30/21 0300 09/30/21 0400 09/30/21 0500 09/30/21 0600   BP: (!) 133/54 (!) 119/52 (!) 141/59 (!) 137/53   Pulse: 62 69 71 69   Resp: 13 18 14 16   Temp:  97.6 °F (36.4 °C)     TempSrc:  Oral     SpO2:  100%  99%   Weight:   119 lb 7.8 oz (54.2 kg)    Height:           Patient was given the following medications:  Medications   metoprolol succinate (TOPROL XL) extended release tablet 50 mg (50 mg Oral Given 9/29/21 1827)   sodium chloride flush 0.9 % injection 5-40 mL (10 mLs IntraVENous Given 9/29/21 2050)   sodium chloride flush 0.9 % injection 5-40 mL (has no administration in time range)   0.9 % sodium chloride infusion (has no administration in time range)   ondansetron (ZOFRAN-ODT) disintegrating tablet 4 mg ( Oral See Alternative 9/29/21 2337)     Or   ondansetron (ZOFRAN) injection 4 mg (4 mg IntraVENous Given 9/29/21 2337)   polyethylene glycol (GLYCOLAX) packet 17 g (has no administration in time range)   acetaminophen (TYLENOL) tablet 650 mg (has no administration in time range)     Or   acetaminophen (TYLENOL) suppository 650 mg (has no administration in time range)   pantoprazole (PROTONIX) 80 mg in sodium chloride 0.9 % 100 mL infusion (8 mg/hr IntraVENous Rate/Dose Verify 9/30/21 0540)   pantoprazole (PROTONIX) tablet 40 mg (has no administration in time range)   lactated ringers infusion ( IntraVENous New Bag 9/30/21 0528)   potassium chloride 20 mEq/50 mL IVPB (Central Line) (has no administration in time range)   sodium phosphate 8.85 mmol in dextrose 5 % 250 mL IVPB (has no administration in time range)     Or   sodium phosphate 17.7 mmol in dextrose 5 % 250 mL IVPB (has no administration in time range)   magnesium sulfate 1000 mg in dextrose 5% 100 mL IVPB (has no administration in time range)   0.9 % sodium chloride bolus (0 mLs IntraVENous Stopped 9/29/21 1536)   iopamidol (ISOVUE-370) 76 % injection 75 mL (75 mLs IntraVENous Given 9/29/21 1443)   iopamidol (ISOVUE-370) 76 % injection 100 mL (100 mLs IntraVENous Given 9/29/21 2030)   0.9 % sodium chloride infusion (150 mL/hr IntraVENous New Bag 9/29/21 2010)   pantoprazole

## 2021-09-29 NOTE — PROGRESS NOTES
Pt seen in  ED, admission completed. Pt is alert and oriented x 4. Pt lives at home with her daughter, and is being admitted for GI bleed. Plan of care updated, all questions answered.

## 2021-09-29 NOTE — PROGRESS NOTES
Pt and daughter unaware of all of pt's home meds. Daughter said she would be going home this evening to look at all the meds she takes and then either call back or come back to tell nurses all of the meds that pt takes.

## 2021-09-29 NOTE — ED NOTES
Pt ambulated to bathroom at this time using cane, gait slightly unsteady especially when first standing, standby assist given.       [address], RN  09/29/21 5982

## 2021-09-29 NOTE — ED NOTES
Pt came in for concerns of bleeding. Pt was at adult  and they saw multiple clots ad a lot of blood in toilet. Daughter who is bedside showed a picture of it and it is bright red blood filling the toilet with clots. Daughter says depends has clots and is saturated with blood as well. Pt takes aspirin.  Pt has dementia and is intermittently non-verbal.      Burrell Severe, LESTER  09/29/21 5084

## 2021-09-29 NOTE — ED NOTES
Pr report given to Inessa Mitchell RN, states no questions or concerns at this time. Pt transported to floor via wheelchair by floor RN with portable telemetry on throughout transport.       [address], RN  09/29/21 6899

## 2021-09-29 NOTE — ED NOTES
Pharmacy Medication History Note      List of current medications patient is taking is complete. Source of information: [address] [place] [address] made to medication list:  Medications flagged for removal (include reason, ex. noncompliance):  none    Medications removed (include reason, ex. therapy complete or physician discontinued):  See list below- therapy completed    Medications added/doses adjusted:  Quetiapine 12.5 mg BID  Meloxicam 7.5 mg daily  Mirtazapine adjusted to 15 mg HS    Other notes (ex. Recent course of antibiotics, Coumadin dosing):  Denies use of other OTC or herbal medications. Last dose times updated. Minesh Rose, PharmD  ED Pharmacist [id number]  9/29/2021    No current facility-administered medications on file prior to encounter.      Current Outpatient Medications on File Prior to Encounter   Medication Sig Dispense Refill    QUEtiapine (SEROQUEL) 25 MG tablet Take 12.5 mg by mouth 2 times daily      meloxicam (MOBIC) 7.5 MG tablet Take 7.5 mg by mouth daily      metoprolol succinate (TOPROL XL) 50 MG extended release tablet Take 1 tablet by mouth twice daily 180 tablet 5    amLODIPine (NORVASC) 5 MG tablet Take 5 mg by mouth daily       losartan (COZAAR) 100 MG tablet TAKE ONE TABLET BY MOUTH ONCE DAILY 90 tablet 1    mirtazapine (REMERON) 15 MG tablet Take 15 mg by mouth nightly       donepezil (ARICEPT) 5 MG tablet Take 1 tablet by mouth nightly 90 tablet 1    aspirin 81 MG tablet Take 81 mg by mouth daily      Multiple Vitamin (MULTIVITAMINS PO) Take 1 tablet by mouth daily       [DISCONTINUED] denosumab (PROLIA) 60 MG/ML SOSY SC injection Inject 60 mg into the skin every 6 months      [DISCONTINUED] Melatonin 10 MG TABS Take by mouth every evening      [DISCONTINUED] alendronate (FOSAMAX) 70 MG tablet Take 70 mg by mouth every 7 days Takes on saturdays      [DISCONTINUED] ondansetron (ZOFRAN) 4 MG tablet Take 1 tablet by mouth every 8 hours as needed for Nausea or Vomiting (Patient not taking: Reported on 10/20/2020) 30 tablet 0    [DISCONTINUED] clotrimazole-betamethasone (LOTRISONE) 1-0.05 % cream Apply topically 2 times daily and cancel script for triamcinolone 0.1 % cream (Patient not taking: Reported on 10/20/2020) 15 g 1    [DISCONTINUED] vitamin D (CHOLECALCIFEROL) 1000 UNIT TABS tablet Take 1,000 Units by mouth daily

## 2021-09-30 ENCOUNTER — ANESTHESIA EVENT (OUTPATIENT)
Dept: ENDOSCOPY | Age: 86
DRG: 378 | End: 2021-09-30
Payer: MEDICARE

## 2021-09-30 ENCOUNTER — APPOINTMENT (OUTPATIENT)
Dept: CT IMAGING | Age: 86
DRG: 378 | End: 2021-09-30
Payer: MEDICARE

## 2021-09-30 ENCOUNTER — ANESTHESIA (OUTPATIENT)
Dept: ENDOSCOPY | Age: 86
DRG: 378 | End: 2021-09-30
Payer: MEDICARE

## 2021-09-30 ENCOUNTER — APPOINTMENT (OUTPATIENT)
Dept: GENERAL RADIOLOGY | Age: 86
DRG: 378 | End: 2021-09-30
Payer: MEDICARE

## 2021-09-30 VITALS
DIASTOLIC BLOOD PRESSURE: 65 MMHG | RESPIRATION RATE: 1 BRPM | OXYGEN SATURATION: 95 % | SYSTOLIC BLOOD PRESSURE: 139 MMHG

## 2021-09-30 PROBLEM — K92.2 ACUTE LOWER GI HEMORRHAGE: Status: ACTIVE | Noted: 2021-09-30

## 2021-09-30 LAB
A/G RATIO: 1.3 (ref 1.1–2.2)
ALBUMIN SERPL-MCNC: 3.2 G/DL (ref 3.4–5)
ALP BLD-CCNC: 45 U/L (ref 40–129)
ALT SERPL-CCNC: 17 U/L (ref 10–40)
ANION GAP SERPL CALCULATED.3IONS-SCNC: 7 MMOL/L (ref 3–16)
AST SERPL-CCNC: 27 U/L (ref 15–37)
BASE EXCESS VENOUS: -3 (ref -3–3)
BILIRUB SERPL-MCNC: 0.4 MG/DL (ref 0–1)
BLOOD BANK DISPENSE STATUS: NORMAL
BLOOD BANK PRODUCT CODE: NORMAL
BPU ID: NORMAL
BUN BLDV-MCNC: 18 MG/DL (ref 7–20)
CALCIUM IONIZED: 1.15 MMOL/L (ref 1.12–1.32)
CALCIUM SERPL-MCNC: 7.7 MG/DL (ref 8.3–10.6)
CHLORIDE BLD-SCNC: 112 MMOL/L (ref 99–110)
CO2: 22 MMOL/L (ref 21–32)
CREAT SERPL-MCNC: 0.6 MG/DL (ref 0.6–1.2)
DESCRIPTION BLOOD BANK: NORMAL
GFR AFRICAN AMERICAN: >60
GFR NON-AFRICAN AMERICAN: >60
GLOBULIN: 2.4 G/DL
GLUCOSE BLD-MCNC: 115 MG/DL (ref 70–99)
GLUCOSE BLD-MCNC: 117 MG/DL (ref 70–99)
GLUCOSE BLD-MCNC: 137 MG/DL (ref 70–99)
HCO3 VENOUS: 23.5 MMOL/L (ref 23–29)
HCT VFR BLD CALC: 23.3 % (ref 36–48)
HCT VFR BLD CALC: 23.4 % (ref 36–48)
HCT VFR BLD CALC: 25.2 % (ref 36–48)
HCT VFR BLD CALC: 27.6 % (ref 36–48)
HEMOGLOBIN: 7.8 G/DL (ref 12–16)
HEMOGLOBIN: 7.9 G/DL (ref 12–16)
HEMOGLOBIN: 7.9 GM/DL (ref 12–16)
HEMOGLOBIN: 8.3 G/DL (ref 12–16)
HEMOGLOBIN: 9.2 G/DL (ref 12–16)
LACTATE: 1.39 MMOL/L (ref 0.4–2)
MAGNESIUM: 2.1 MG/DL (ref 1.8–2.4)
O2 SAT, VEN: 41 %
PCO2, VEN: 48 MM HG (ref 40–50)
PERFORMED ON: ABNORMAL
PERFORMED ON: ABNORMAL
PH VENOUS: 7.3 (ref 7.35–7.45)
PHOSPHORUS: 2.9 MG/DL (ref 2.5–4.9)
PO2, VEN: 26 MM HG
POC HEMATOCRIT: 23 % (ref 36–48)
POC POTASSIUM: 4.2 MMOL/L (ref 3.5–5.1)
POC SAMPLE TYPE: ABNORMAL
POC SODIUM: 144 MMOL/L (ref 136–145)
POTASSIUM SERPL-SCNC: 4.1 MMOL/L (ref 3.5–5.1)
SARS-COV-2, NAAT: NOT DETECTED
SODIUM BLD-SCNC: 141 MMOL/L (ref 136–145)
TCO2 CALC VENOUS: 25 MMOL/L
TOTAL PROTEIN: 5.6 G/DL (ref 6.4–8.2)

## 2021-09-30 PROCEDURE — 3700000000 HC ANESTHESIA ATTENDED CARE: Performed by: INTERNAL MEDICINE

## 2021-09-30 PROCEDURE — 2580000003 HC RX 258: Performed by: NURSE ANESTHETIST, CERTIFIED REGISTERED

## 2021-09-30 PROCEDURE — 2000000000 HC ICU R&B

## 2021-09-30 PROCEDURE — 71045 X-RAY EXAM CHEST 1 VIEW: CPT

## 2021-09-30 PROCEDURE — 2700000000 HC OXYGEN THERAPY PER DAY

## 2021-09-30 PROCEDURE — 2580000003 HC RX 258: Performed by: ANESTHESIOLOGY

## 2021-09-30 PROCEDURE — 0DJ08ZZ INSPECTION OF UPPER INTESTINAL TRACT, VIA NATURAL OR ARTIFICIAL OPENING ENDOSCOPIC: ICD-10-PCS | Performed by: INTERNAL MEDICINE

## 2021-09-30 PROCEDURE — 82947 ASSAY GLUCOSE BLOOD QUANT: CPT

## 2021-09-30 PROCEDURE — 6370000000 HC RX 637 (ALT 250 FOR IP): Performed by: INTERNAL MEDICINE

## 2021-09-30 PROCEDURE — 7100000001 HC PACU RECOVERY - ADDTL 15 MIN: Performed by: INTERNAL MEDICINE

## 2021-09-30 PROCEDURE — 83605 ASSAY OF LACTIC ACID: CPT

## 2021-09-30 PROCEDURE — 6360000002 HC RX W HCPCS: Performed by: NURSE ANESTHETIST, CERTIFIED REGISTERED

## 2021-09-30 PROCEDURE — 2580000003 HC RX 258: Performed by: INTERNAL MEDICINE

## 2021-09-30 PROCEDURE — 36415 COLL VENOUS BLD VENIPUNCTURE: CPT

## 2021-09-30 PROCEDURE — 74174 CTA ABD&PLVS W/CONTRAST: CPT

## 2021-09-30 PROCEDURE — 7100000000 HC PACU RECOVERY - FIRST 15 MIN: Performed by: INTERNAL MEDICINE

## 2021-09-30 PROCEDURE — 3609027000 HC COLONOSCOPY: Performed by: INTERNAL MEDICINE

## 2021-09-30 PROCEDURE — 51702 INSERT TEMP BLADDER CATH: CPT

## 2021-09-30 PROCEDURE — 80053 COMPREHEN METABOLIC PANEL: CPT

## 2021-09-30 PROCEDURE — 6360000004 HC RX CONTRAST MEDICATION: Performed by: INTERNAL MEDICINE

## 2021-09-30 PROCEDURE — 87635 SARS-COV-2 COVID-19 AMP PRB: CPT

## 2021-09-30 PROCEDURE — 94761 N-INVAS EAR/PLS OXIMETRY MLT: CPT

## 2021-09-30 PROCEDURE — 6370000000 HC RX 637 (ALT 250 FOR IP): Performed by: PHYSICIAN ASSISTANT

## 2021-09-30 PROCEDURE — 2500000003 HC RX 250 WO HCPCS: Performed by: NURSE ANESTHETIST, CERTIFIED REGISTERED

## 2021-09-30 PROCEDURE — 6360000002 HC RX W HCPCS: Performed by: INTERNAL MEDICINE

## 2021-09-30 PROCEDURE — 3700000001 HC ADD 15 MINUTES (ANESTHESIA): Performed by: INTERNAL MEDICINE

## 2021-09-30 PROCEDURE — 82803 BLOOD GASES ANY COMBINATION: CPT

## 2021-09-30 PROCEDURE — 84100 ASSAY OF PHOSPHORUS: CPT

## 2021-09-30 PROCEDURE — 82330 ASSAY OF CALCIUM: CPT

## 2021-09-30 PROCEDURE — 2500000003 HC RX 250 WO HCPCS: Performed by: INTERNAL MEDICINE

## 2021-09-30 PROCEDURE — 85014 HEMATOCRIT: CPT

## 2021-09-30 PROCEDURE — 3609017100 HC EGD: Performed by: INTERNAL MEDICINE

## 2021-09-30 PROCEDURE — C9113 INJ PANTOPRAZOLE SODIUM, VIA: HCPCS | Performed by: INTERNAL MEDICINE

## 2021-09-30 PROCEDURE — 2580000003 HC RX 258: Performed by: HOSPITALIST

## 2021-09-30 PROCEDURE — 84132 ASSAY OF SERUM POTASSIUM: CPT

## 2021-09-30 PROCEDURE — 2709999900 HC NON-CHARGEABLE SUPPLY: Performed by: INTERNAL MEDICINE

## 2021-09-30 PROCEDURE — 83735 ASSAY OF MAGNESIUM: CPT

## 2021-09-30 PROCEDURE — 84295 ASSAY OF SERUM SODIUM: CPT

## 2021-09-30 PROCEDURE — 85018 HEMOGLOBIN: CPT

## 2021-09-30 PROCEDURE — 0DJD8ZZ INSPECTION OF LOWER INTESTINAL TRACT, VIA NATURAL OR ARTIFICIAL OPENING ENDOSCOPIC: ICD-10-PCS | Performed by: INTERNAL MEDICINE

## 2021-09-30 RX ORDER — HYDROMORPHONE HCL 110MG/55ML
0.25 PATIENT CONTROLLED ANALGESIA SYRINGE INTRAVENOUS EVERY 5 MIN PRN
Status: DISCONTINUED | OUTPATIENT
Start: 2021-09-30 | End: 2021-09-30 | Stop reason: HOSPADM

## 2021-09-30 RX ORDER — 0.9 % SODIUM CHLORIDE 0.9 %
1000 INTRAVENOUS SOLUTION INTRAVENOUS ONCE
Status: COMPLETED | OUTPATIENT
Start: 2021-09-30 | End: 2021-10-01

## 2021-09-30 RX ORDER — EPINEPHRINE 1 MG/ML
INJECTION, SOLUTION, CONCENTRATE INTRAVENOUS PRN
Status: DISCONTINUED | OUTPATIENT
Start: 2021-09-30 | End: 2021-09-30 | Stop reason: SDUPTHER

## 2021-09-30 RX ORDER — SODIUM CHLORIDE 9 MG/ML
INJECTION, SOLUTION INTRAVENOUS CONTINUOUS PRN
Status: DISCONTINUED | OUTPATIENT
Start: 2021-09-30 | End: 2021-09-30 | Stop reason: SDUPTHER

## 2021-09-30 RX ORDER — FENTANYL CITRATE 50 UG/ML
50 INJECTION, SOLUTION INTRAMUSCULAR; INTRAVENOUS EVERY 5 MIN PRN
Status: DISCONTINUED | OUTPATIENT
Start: 2021-09-30 | End: 2021-09-30 | Stop reason: HOSPADM

## 2021-09-30 RX ORDER — GLYCOPYRROLATE 1 MG/5 ML
SYRINGE (ML) INTRAVENOUS PRN
Status: DISCONTINUED | OUTPATIENT
Start: 2021-09-30 | End: 2021-09-30 | Stop reason: SDUPTHER

## 2021-09-30 RX ORDER — DIPHENHYDRAMINE HYDROCHLORIDE 50 MG/ML
12.5 INJECTION INTRAMUSCULAR; INTRAVENOUS
Status: DISCONTINUED | OUTPATIENT
Start: 2021-09-30 | End: 2021-09-30 | Stop reason: HOSPADM

## 2021-09-30 RX ORDER — HYDROMORPHONE HCL 110MG/55ML
0.5 PATIENT CONTROLLED ANALGESIA SYRINGE INTRAVENOUS EVERY 5 MIN PRN
Status: DISCONTINUED | OUTPATIENT
Start: 2021-09-30 | End: 2021-09-30 | Stop reason: HOSPADM

## 2021-09-30 RX ORDER — ZIPRASIDONE MESYLATE 20 MG/ML
10 INJECTION, POWDER, LYOPHILIZED, FOR SOLUTION INTRAMUSCULAR EVERY 6 HOURS PRN
Status: DISCONTINUED | OUTPATIENT
Start: 2021-09-30 | End: 2021-10-07 | Stop reason: HOSPADM

## 2021-09-30 RX ORDER — MEPERIDINE HYDROCHLORIDE 25 MG/ML
12.5 INJECTION INTRAMUSCULAR; INTRAVENOUS; SUBCUTANEOUS EVERY 5 MIN PRN
Status: DISCONTINUED | OUTPATIENT
Start: 2021-09-30 | End: 2021-09-30 | Stop reason: HOSPADM

## 2021-09-30 RX ORDER — LIDOCAINE HYDROCHLORIDE 20 MG/ML
INJECTION, SOLUTION EPIDURAL; INFILTRATION; INTRACAUDAL; PERINEURAL PRN
Status: DISCONTINUED | OUTPATIENT
Start: 2021-09-30 | End: 2021-09-30 | Stop reason: SDUPTHER

## 2021-09-30 RX ORDER — LANOLIN ALCOHOL/MO/W.PET/CERES
10 CREAM (GRAM) TOPICAL NIGHTLY PRN
Status: DISCONTINUED | OUTPATIENT
Start: 2021-09-30 | End: 2021-10-07 | Stop reason: HOSPADM

## 2021-09-30 RX ORDER — PEG-3350, SODIUM SULFATE, SODIUM CHLORIDE, POTASSIUM CHLORIDE, SODIUM ASCORBATE AND ASCORBIC ACID 7.5-2.691G
100 KIT ORAL ONCE
Status: COMPLETED | OUTPATIENT
Start: 2021-09-30 | End: 2021-09-30

## 2021-09-30 RX ORDER — SODIUM CHLORIDE 9 MG/ML
INJECTION, SOLUTION INTRAVENOUS PRN
Status: DISCONTINUED | OUTPATIENT
Start: 2021-09-30 | End: 2021-10-05 | Stop reason: SDUPTHER

## 2021-09-30 RX ORDER — SODIUM CHLORIDE 9 MG/ML
INJECTION, SOLUTION INTRAVENOUS CONTINUOUS
Status: DISCONTINUED | OUTPATIENT
Start: 2021-09-30 | End: 2021-10-01

## 2021-09-30 RX ORDER — OXYCODONE HYDROCHLORIDE 5 MG/1
10 TABLET ORAL PRN
Status: DISCONTINUED | OUTPATIENT
Start: 2021-09-30 | End: 2021-09-30 | Stop reason: HOSPADM

## 2021-09-30 RX ORDER — PROMETHAZINE HYDROCHLORIDE 25 MG/ML
6.25 INJECTION, SOLUTION INTRAMUSCULAR; INTRAVENOUS PRN
Status: DISCONTINUED | OUTPATIENT
Start: 2021-09-30 | End: 2021-09-30 | Stop reason: HOSPADM

## 2021-09-30 RX ORDER — EPHEDRINE SULFATE 50 MG/ML
INJECTION INTRAVENOUS PRN
Status: DISCONTINUED | OUTPATIENT
Start: 2021-09-30 | End: 2021-09-30 | Stop reason: SDUPTHER

## 2021-09-30 RX ORDER — NOREPINEPHRINE BIT/0.9 % NACL 16MG/250ML
2-100 INFUSION BOTTLE (ML) INTRAVENOUS CONTINUOUS
Status: DISCONTINUED | OUTPATIENT
Start: 2021-09-30 | End: 2021-10-02

## 2021-09-30 RX ORDER — LABETALOL HYDROCHLORIDE 5 MG/ML
5 INJECTION, SOLUTION INTRAVENOUS EVERY 10 MIN PRN
Status: DISCONTINUED | OUTPATIENT
Start: 2021-09-30 | End: 2021-09-30 | Stop reason: HOSPADM

## 2021-09-30 RX ORDER — SODIUM CHLORIDE 9 MG/ML
INJECTION, SOLUTION INTRAVENOUS
Status: COMPLETED
Start: 2021-09-30 | End: 2021-09-30

## 2021-09-30 RX ORDER — PROPOFOL 10 MG/ML
INJECTION, EMULSION INTRAVENOUS PRN
Status: DISCONTINUED | OUTPATIENT
Start: 2021-09-30 | End: 2021-09-30 | Stop reason: SDUPTHER

## 2021-09-30 RX ORDER — OXYCODONE HYDROCHLORIDE 5 MG/1
5 TABLET ORAL PRN
Status: DISCONTINUED | OUTPATIENT
Start: 2021-09-30 | End: 2021-09-30 | Stop reason: HOSPADM

## 2021-09-30 RX ADMIN — PROPOFOL 20 MG: 10 INJECTION, EMULSION INTRAVENOUS at 13:36

## 2021-09-30 RX ADMIN — EPHEDRINE SULFATE 20 MG: 50 INJECTION, SOLUTION INTRAVENOUS at 13:50

## 2021-09-30 RX ADMIN — EPINEPHRINE 10 MCG: 1 INJECTION, SOLUTION INTRAMUSCULAR; SUBCUTANEOUS at 13:51

## 2021-09-30 RX ADMIN — PROPOFOL 30 MG: 10 INJECTION, EMULSION INTRAVENOUS at 13:34

## 2021-09-30 RX ADMIN — PROPOFOL 10 MG: 10 INJECTION, EMULSION INTRAVENOUS at 13:47

## 2021-09-30 RX ADMIN — PEG-3350, SODIUM SULFATE, SODIUM CHLORIDE, POTASSIUM CHLORIDE, SODIUM ASCORBATE AND ASCORBIC ACID 100 G: KIT at 08:30

## 2021-09-30 RX ADMIN — SODIUM CHLORIDE: 9 INJECTION, SOLUTION INTRAVENOUS at 13:29

## 2021-09-30 RX ADMIN — SODIUM CHLORIDE: 9 INJECTION, SOLUTION INTRAVENOUS at 13:17

## 2021-09-30 RX ADMIN — Medication 10 ML: at 19:51

## 2021-09-30 RX ADMIN — EPINEPHRINE 10 MCG: 1 INJECTION, SOLUTION INTRAMUSCULAR; SUBCUTANEOUS at 13:59

## 2021-09-30 RX ADMIN — PHENYLEPHRINE HYDROCHLORIDE 100 MCG: 10 INJECTION INTRAVENOUS at 13:36

## 2021-09-30 RX ADMIN — MELATONIN TAB 3 MG 10.5 MG: 3 TAB at 20:41

## 2021-09-30 RX ADMIN — ZIPRASIDONE MESYLATE 10 MG: 20 INJECTION, POWDER, LYOPHILIZED, FOR SOLUTION INTRAMUSCULAR at 21:27

## 2021-09-30 RX ADMIN — IOPAMIDOL 100 ML: 755 INJECTION, SOLUTION INTRAVENOUS at 15:34

## 2021-09-30 RX ADMIN — PROPOFOL 20 MG: 10 INJECTION, EMULSION INTRAVENOUS at 13:44

## 2021-09-30 RX ADMIN — METOPROLOL SUCCINATE 50 MG: 50 TABLET, EXTENDED RELEASE ORAL at 10:26

## 2021-09-30 RX ADMIN — LIDOCAINE HYDROCHLORIDE 60 MG: 20 INJECTION, SOLUTION EPIDURAL; INFILTRATION; INTRACAUDAL; PERINEURAL at 13:34

## 2021-09-30 RX ADMIN — Medication 0.2 MG: at 13:34

## 2021-09-30 RX ADMIN — PHENYLEPHRINE HYDROCHLORIDE 100 MCG: 10 INJECTION INTRAVENOUS at 13:41

## 2021-09-30 RX ADMIN — SODIUM CHLORIDE 8 MG/HR: 9 INJECTION, SOLUTION INTRAVENOUS at 08:33

## 2021-09-30 RX ADMIN — Medication 2 MCG/MIN: at 23:20

## 2021-09-30 RX ADMIN — PEG-3350, SODIUM SULFATE, SODIUM CHLORIDE, POTASSIUM CHLORIDE, SODIUM ASCORBATE AND ASCORBIC ACID 100 G: KIT at 09:44

## 2021-09-30 RX ADMIN — Medication 10 ML: at 10:27

## 2021-09-30 RX ADMIN — PHENYLEPHRINE HYDROCHLORIDE 150 MCG: 10 INJECTION INTRAVENOUS at 13:44

## 2021-09-30 RX ADMIN — PROPOFOL 20 MG: 10 INJECTION, EMULSION INTRAVENOUS at 13:40

## 2021-09-30 RX ADMIN — PROPOFOL 20 MG: 10 INJECTION, EMULSION INTRAVENOUS at 13:38

## 2021-09-30 RX ADMIN — SODIUM CHLORIDE 8 MG/HR: 9 INJECTION, SOLUTION INTRAVENOUS at 19:56

## 2021-09-30 RX ADMIN — PHENYLEPHRINE HYDROCHLORIDE 100 MCG: 10 INJECTION INTRAVENOUS at 13:39

## 2021-09-30 RX ADMIN — SODIUM CHLORIDE 1000 ML: 9 INJECTION, SOLUTION INTRAVENOUS at 22:48

## 2021-09-30 RX ADMIN — SODIUM CHLORIDE, POTASSIUM CHLORIDE, SODIUM LACTATE AND CALCIUM CHLORIDE: 600; 310; 30; 20 INJECTION, SOLUTION INTRAVENOUS at 05:28

## 2021-09-30 RX ADMIN — Medication 0.2 MG: at 13:41

## 2021-09-30 ASSESSMENT — PULMONARY FUNCTION TESTS
PIF_VALUE: 1

## 2021-09-30 ASSESSMENT — PAIN SCALES - GENERAL
PAINLEVEL_OUTOF10: 0

## 2021-09-30 ASSESSMENT — PAIN - FUNCTIONAL ASSESSMENT: PAIN_FUNCTIONAL_ASSESSMENT: 0-10

## 2021-09-30 NOTE — SIGNIFICANT EVENT
Evaluated patient at bedside following rapid response. She had active bright red blood per rectum. Start CTAabdomen/pelvis obtained, no active bleed noted on imaging. Transferred to ICU due to the fact that she had significant amount of bleed that was noted at bedside. Repeat H&H with about 2 g drop in hemoglobin. Right femoral line placed to ensure adequate access, should patient need pressor support.       SIGNED: Dee Clayton MD, MPH . 9/29/2021

## 2021-09-30 NOTE — PROGRESS NOTES
Pt stable and able to be transported to CT for CTA per Dr Brant Noel order. Blood infusing at this time. Monitors in place. Aneta Lombardo and this Rn at bedside. VSS. No signs of distress.

## 2021-09-30 NOTE — ANESTHESIA PRE PROCEDURE
Department of Anesthesiology  Preprocedure Note       Name:  Shreya Yun   Age:  80 y.o.  :  1933                                          MRN:  [id number]         Date:  2021      Surgeon: Cherelle Rain):  Neisha Soto MD    Procedure: Procedure(s):  EGD DIAGNOSTIC ONLY  COLONOSCOPY DIAGNOSTIC    Medications prior to admission:   Prior to Admission medications    Medication Sig Start Date End Date Taking?  Authorizing Provider   QUEtiapine (SEROQUEL) 25 MG tablet Take 25 mg by mouth nightly    Yes Historical Provider, MD   meloxicam (MOBIC) 7.5 MG tablet Take 7.5 mg by mouth daily as needed    Yes Historical Provider, MD   metoprolol succinate (TOPROL XL) 50 MG extended release tablet Take 1 tablet by mouth twice daily 21  Yes Zoe Frausot MD   amLODIPine (NORVASC) 5 MG tablet Take 5 mg by mouth daily  3/23/21  Yes Historical Provider, MD   losartan (COZAAR) 100 MG tablet TAKE ONE TABLET BY MOUTH ONCE DAILY 3/22/21  Yes Zoe Frausto MD   donepezil (ARICEPT) 5 MG tablet Take 1 tablet by mouth nightly 21  Yes Zoe Frausto MD   aspirin 81 MG tablet Take 81 mg by mouth daily   Yes Historical Provider, MD   Multiple Vitamin (MULTIVITAMINS PO) Take 1 tablet by mouth daily    Yes Historical Provider, MD   mirtazapine (REMERON) 15 MG tablet Take 15 mg by mouth nightly  2/15/21   Historical Provider, MD       Current medications:    Current Facility-Administered Medications   Medication Dose Route Frequency Provider Last Rate Last Admin    meperidine (DEMEROL) injection 12.5 mg  12.5 mg IntraVENous Q5 Min PRN Katina Frank MD        HYDROmorphone (DILAUDID) injection 0.25 mg  0.25 mg IntraVENous Q5 Min PRN Katina Frank MD        fentaNYL (SUBLIMAZE) injection 50 mcg  50 mcg IntraVENous Q5 Min PRN Katina Frank MD        HYDROmorphone (DILAUDID) injection 0.25 mg  0.25 mg IntraVENous Q5 Min PRN Katina Frank MD        HYDROmorphone (DILAUDID) injection 0.5 mg  0.5 mg IntraVENous Q5 Min Carmita Garcia MD        sodium phosphate 8.85 mmol in dextrose 5 % 250 mL IVPB  0.16 mmol/kg IntraVENous PRN Maricel Cedeño MD        Or    sodium phosphate 17.7 mmol in dextrose 5 % 250 mL IVPB  0.32 mmol/kg IntraVENous PRN Maricel Cedeño MD        magnesium sulfate 1000 mg in dextrose 5% 100 mL IVPB  1,000 mg IntraVENous PRN Maricel Cedeño MD           Allergies:  No Known Allergies    Problem List:    Patient Active Problem List   Diagnosis Code    Essential hypertension I10    Chronic right shoulder pain M25.511, G89.29    Acute pain of right shoulder M25.511    Late onset Alzheimer's disease with behavioral disturbance (HCC) G30.1, F02.81    Small bowel obstruction ([facility] [address] K56.609    Left lower quadrant abdominal pain R10.32    Acute kidney injury superimposed on chronic kidney disease ([address] N17.9, N18.9    Hypercalcemia E83.52    Hypokalemia E87.6    CKD (chronic kidney disease) stage 3, GFR 30-59 ml/min ([facility]) N18.30    Incarcerated inguinal hernia K40.30    GI bleed K92.2    Acute lower GI hemorrhage K92.2       Past Medical History:        Diagnosis Date    Chronic back pain     CKD (chronic kidney disease) stage 3, GFR 30-59 ml/min (HCC)     Dementia ([address]     often non-verbal    Hypertension        Past Surgical History:        Procedure Laterality Date    HERNIA REPAIR Left 4/2/2020    REPAIR OF INCARCERATED LEFT INGUINAL HERNIA WITH  MESH performed by Allen Harvey [facility], TOTAL ABDOMINAL      TOE SURGERY         Social History:    Social History     Tobacco Use    Smoking status: Never Smoker    Smokeless tobacco: Never Used   Substance Use Topics    Alcohol use:  No                                Counseling given: Not Answered      Vital Signs (Current):   Vitals:    09/30/21 0400 09/30/21 0500 09/30/21 0600 09/30/21 1026   BP: (!) 119/52 (!) 141/59 (!) 137/53 (!) 143/59   Pulse: 69 71 69 95   Resp: 18 14 16    Temp: 97.6 °F (36.4 °C) TempSrc: Oral      SpO2: 100%  99%    Weight:  119 lb 7.8 oz (54.2 kg)     Height:                                                  BP Readings from Last 3 Encounters:   09/30/21 (!) 143/59   04/12/21 135/69   02/23/21 (!) 158/72       NPO Status:                                                                                 BMI:   Wt Readings from Last 3 Encounters:   09/30/21 119 lb 7.8 oz (54.2 kg)   04/12/21 111 lb (50.3 kg)   02/23/21 111 lb (50.3 kg)     Body mass index is 20.51 kg/m². CBC:   Lab Results   Component Value Date    WBC 7.2 09/29/2021    RBC 4.45 09/29/2021    HGB 7.8 09/30/2021    HCT 23.3 09/30/2021    MCV 83.3 09/29/2021    RDW 16.3 09/29/2021     09/29/2021       CMP:   Lab Results   Component Value Date     09/30/2021    K 4.1 09/30/2021    K 4.6 09/29/2021     09/30/2021    CO2 22 09/30/2021    BUN 18 09/30/2021    CREATININE 0.6 09/30/2021    GFRAA >60 09/30/2021    AGRATIO 1.3 09/30/2021    LABGLOM >60 09/30/2021    GLUCOSE 115 09/30/2021    PROT 5.6 09/30/2021    CALCIUM 7.7 09/30/2021    BILITOT 0.4 09/30/2021    ALKPHOS 45 09/30/2021    AST 27 09/30/2021    ALT 17 09/30/2021       POC Tests:   Recent Labs     09/30/21  0122 09/30/21  0122 09/30/21  0911   POCGLU 137*   < > 117*   POCNA 144  --   --    POCK 4.2  --   --    POCHCT 23.0*  --   --     < > = values in this interval not displayed.        Coags:   Lab Results   Component Value Date    PROTIME 11.5 09/29/2021    INR 1.02 09/29/2021    APTT 20.4 09/29/2021       HCG (If Applicable): No results found for: PREGTESTUR, PREGSERUM, HCG, HCGQUANT     ABGs: No results found for: PHART, PO2ART, SGB8OGB, RJP8ZMD, BEART, N0SMFSJZ     Type & Screen (If Applicable):  No results found for: LABABO, LABRH    Drug/Infectious Status (If Applicable):  No results found for: HIV, HEPCAB    COVID-19 Screening (If Applicable):   Lab Results   Component Value Date    COVID19 Not Detected 09/30/2021           Anesthesia Evaluation    Airway: Mallampati: II  TM distance: >3 FB   Neck ROM: full  Mouth opening: > = 3 FB Dental:          Pulmonary:                              Cardiovascular:    (+) hypertension:,         Rhythm: regular  Rate: normal                    Neuro/Psych:   (+) psychiatric history:            GI/Hepatic/Renal:             Endo/Other:                     Abdominal:             Vascular: Other Findings:             Anesthesia Plan      MAC     ASA 3       Induction: intravenous. Anesthetic plan and risks discussed with patient. Plan discussed with CRNA.                   Clary Delgado MD   9/30/2021

## 2021-09-30 NOTE — PROGRESS NOTES
Pt assisted to bathroom, noted large amounts of rere bleeding and clots from rectum. Perfect serve sent to NP. Then noted pt to have large amount of rere active bleeding from rectum in bed. VSS stable. Rapid response called, Dr. Carla Castillo to bedside. Stat H/H, CTA ABD, and transfer to ICU ordered.

## 2021-09-30 NOTE — CONSULTS
Gastroenterology Consult Note        Patient: Hoang Castillo  : 1933  Acct#:      Date:  2021    Subjective:       History of Present Illness  Patient is a 80 y.o.  female admitted with Hematochezia [K92.1]  GI bleed [K92.2]  Acute lower GI hemorrhage [K92.2] who is seen in consult for hematochezia. Patient with history of dementia and hypertension. Some history obtained from her daughter. Patient was at adult  yesterday when she had a bowel movement described as red blood. Was brought to the ER and was admitted. Passed large-volume blood per rectum last night was transferred to the ER. Had 1 episode of clots per rectum overnight. Patient denies abdominal pain. Per reports she had an episode of emesis overnight so NG tube was placed. NG tube lavage was clear/nonbloody. No prior history of GI bleed. Patient's daughter thinks patient has had a colonoscopy over 6 years ago. Takes Mobic 2-3 times per week. Past Medical History:   Diagnosis Date    Chronic back pain     CKD (chronic kidney disease) stage 3, GFR 30-59 ml/min ([facility])     Dementia ([facility] [address]     often non-verbal    Hypertension       Past Surgical History:   Procedure Laterality Date    HERNIA REPAIR Left 2020    REPAIR OF INCARCERATED LEFT INGUINAL HERNIA WITH  MESH performed by Amhet Blount MD at [facility] [address] [place], TOTAL ABDOMINAL      TOE SURGERY        Past Endoscopic History    Admission Meds  No current facility-administered medications on file prior to encounter.      Current Outpatient Medications on File Prior to Encounter   Medication Sig Dispense Refill    QUEtiapine (SEROQUEL) 25 MG tablet Take 25 mg by mouth nightly       meloxicam (MOBIC) 7.5 MG tablet Take 7.5 mg by mouth daily as needed       metoprolol succinate (TOPROL XL) 50 MG extended release tablet Take 1 tablet by mouth twice daily 180 tablet 5    amLODIPine (NORVASC) 5 MG tablet Take 5 mg by mouth daily       losartan (COZAAR) 100 MG tablet TAKE ONE TABLET BY MOUTH ONCE DAILY 90 tablet 1    donepezil (ARICEPT) 5 MG tablet Take 1 tablet by mouth nightly 90 tablet 1    aspirin 81 MG tablet Take 81 mg by mouth daily      Multiple Vitamin (MULTIVITAMINS PO) Take 1 tablet by mouth daily       mirtazapine (REMERON) 15 MG tablet Take 15 mg by mouth nightly              Allergies  No Known Allergies   Social   Social History     Tobacco Use    Smoking status: Never Smoker    Smokeless tobacco: Never Used   Substance Use Topics    Alcohol use: No        Family History   Problem Relation Age of Onset    Diabetes Mother     Diabetes Sister           Review of Systems  Review of systems not obtained due to patient factors. not a reliable historian       Physical Exam  Blood pressure (!) 137/53, pulse 69, temperature 97.6 °F (36.4 °C), temperature source Oral, resp. rate 16, height 5' 4\" (1.626 m), weight 119 lb 7.8 oz (54.2 kg), SpO2 99 %, not currently breastfeeding. General appearance: alert, cooperative, no distress, appears stated age  Eyes: Anicteric  Head: Normocephalic, without obvious abnormality  Lungs: clear to auscultation bilaterally, Normal Effort  Heart: regular rate and rhythm, normal S1 and S2, no murmurs or rubs  Abdomen: soft, non-tender. Bowel sounds normal. No masses,  no organomegaly. Extremities: atraumatic, no cyanosis or edema  Skin: warm and dry, no jaundice  Neuro: Grossly intact, A&OX1  Musculoskeletal: 5/5  strength BUE      Data Review:    Recent Labs     09/29/21  1355 09/29/21  1355 09/29/21  2103 09/30/21  0122 09/30/21  0130   WBC 7.2  --   --   --   --    HGB 12.1   < > 9.8* 7.9* 8.3*   HCT 37.0  --  29.8*  --  25.2*   MCV 83.3  --   --   --   --      --   --   --   --     < > = values in this interval not displayed.      Recent Labs     09/29/21  1355 09/30/21  0450    141   K 4.6 4.1    112*   CO2 23 22   PHOS  --  2.9   BUN 18 18   CREATININE 0.7 0.6     Recent Labs     09/29/21  1355 09/30/21  0450   AST 42* 27   ALT 25 17   BILITOT 0.4 0.4   ALKPHOS 72 45     No results for input(s): LIPASE, AMYLASE in the last 72 hours. Recent Labs     09/29/21  1355   PROTIME 11.5   INR 1.02     No results for input(s): PTT in the last 72 hours. No results for input(s): OCCULTBLD in the last 72 hours. Imaging Studies:                 CTA-scan of abdomen and pelvis 9/29/21:  Impression   Unremarkable CT angiogram of the abdomen and pelvis.  Patent mesenteric   arteries and veins.  No findings of acute or chronic mesenteric ischemia.  No   active gastrointestinal bleed.       Extensive colonic diverticulosis is again noted, without evidence of acute   diverticulitis.       Cholelithiasis.                        Assessment:     Active Problems:    GI bleed    Acute lower GI hemorrhage  Resolved Problems:    * No resolved hospital problems. *    Bright red blood per rectum -began yesterday. CTA negative for active bleed. Does have diverticulosis so could be a diverticular bleed. Is on Mobic 2-3 times a week so consider upper GI bleed. However, BUN to creatinine ratio was normal.  Acute blood loss anemia -due to above. Hemoglobin from 12.1-8.3. Recommendations:   -N.p.o.  -Bowel prep via NG tube  -Serial H&H  -On IV PPI  -Plan colonoscopy this afternoon. Would plan EGD if colonoscopy negative for source of bleed. Discussed with Dr. Tavia Graves, PA-C  [facility]    I have personally performed a face to face diagnostic evaluation on this patient. I have interviewed and examined the patient and I agree with the findings and recommended plan of care. In summary, my findings and plan are the following: Pt admitted with painless hematochezia. Had N/V (non-bloody) and NG placed. +NSAIDS but BUN:Cr normal. Rec EGD and colon. Diverticular bleed most likely.        Elsie Friend MD  [address]

## 2021-09-30 NOTE — OP NOTE
Operative Note      Patient: Alex Alonso  YOB: 1933  MRN: [id number]    Date of Procedure: 9/30/2021    Pre-Op Diagnosis: GI BLEED    EGD    Normal     Colonoscopy     IMPRESSION :   Large amount of fresh blood and effluent throughout colon  with pancolonic diverticulosis. On 2 occasions pt became  severely bradycardic and briefly lost a pulse and the scope  had to be quickly withdrawn. I was eventually able to see  the cecum and proximal AC from a distance but could not  fully intubate. Tics were noted in the [facility] as well. Upon  withdrawal of the scope, no actively bleeding diverticulum  could be found but visualization was challenging and many  of the tics were filled with stool/clots. PLAN : Stat CTA given likelihood of active bleeding.  IR consult if  CTA positive   2 U PRBC  NPO    Luz Zhu MD

## 2021-09-30 NOTE — PROGRESS NOTES
Hospitalist Progress Note      PCP: Jose Alfredo Palencia MD    Date of Admission: 9/29/2021    Chief Complaint: rectal Bleeding    Hospital Course: Stephen Shankar is a 80 y.o. female who presented with GI bleed. Patient was at adult  when she noted some clots. Patient said noted some bright red blood and clots in the stool. On the depends. Patient does take aspirin. Denies any significant symptoms. A couple episodes today. No dyspnea no chest pain no history of GI bleed previously recently. Subjective: Patient seen and examined. Profuse bright red bleeding per rectum overnight with hgb drop. Denies any new complaints. No abdominal or rectal pain, fever or chills. Scheduled for endoscopy today. Medications:  Reviewed    Infusion Medications    sodium chloride      pantoprozole (PROTONIX) infusion 8 mg/hr (09/30/21 0833)    lactated ringers 100 mL/hr at 09/30/21 0830     Scheduled Medications    metoprolol succinate  50 mg Oral Daily    sodium chloride flush  5-40 mL IntraVENous 2 times per day    [START ON 10/2/2021] pantoprazole  40 mg Oral QAM AC     PRN Meds: sodium chloride flush, sodium chloride, ondansetron **OR** ondansetron, polyethylene glycol, acetaminophen **OR** acetaminophen, potassium chloride, sodium phosphate IVPB **OR** sodium phosphate IVPB, magnesium sulfate      Intake/Output Summary (Last 24 hours) at 9/30/2021 1228  Last data filed at 9/30/2021 0540  Gross per 24 hour   Intake 465.97 ml   Output 550 ml   Net -84.03 ml       Physical Exam Performed:    BP (!) 143/59   Pulse 95   Temp 97.6 °F (36.4 °C) (Oral)   Resp 16   Ht 5' 4\" (1.626 m)   Wt 119 lb 7.8 oz (54.2 kg)   SpO2 99%   BMI 20.51 kg/m²     General appearance: No apparent distress, appears stated age and cooperative. HEENT: Pupils equal, round, and reactive to light. Conjunctivae clear. Neck: Supple, with full range of motion. No jugular venous distention. Trachea midline.   Respiratory:  Normal respiratory effort. Clear to auscultation, bilaterally without Rales/Wheezes/Rhonchi. Cardiovascular: Regular rate and rhythm with normal S1/S2 without murmurs, rubs or gallops. Abdomen: Soft, non-tender, non-distended with normal bowel sounds. Musculoskeletal: No clubbing, cyanosis or edema bilaterally. Full range of motion without deformity. Skin: Skin color, texture, turgor normal.  No rashes or lesions. Neurologic:  Neurovascularly intact without any focal sensory/motor deficits. Cranial nerves: II-XII intact, grossly non-focal.  Psychiatric: Alert and oriented, thought content appropriate, normal insight  Capillary Refill: Brisk,3 seconds, normal   Peripheral Pulses: +2 palpable, equal bilaterally     Right femoral triple-lumen catheter. Labs:   Recent Labs     09/29/21  1355 09/29/21  1355 09/29/21 2103 09/29/21  2103 09/30/21  0122 09/30/21  0130 09/30/21  0745   WBC 7.2  --   --   --   --   --   --    HGB 12.1   < > 9.8*   < > 7.9* 8.3* 7.8*   HCT 37.0   < > 29.8*  --   --  25.2* 23.3*     --   --   --   --   --   --     < > = values in this interval not displayed. Recent Labs     09/29/21  1355 09/30/21  0450    141   K 4.6 4.1    112*   CO2 23 22   BUN 18 18   CREATININE 0.7 0.6   CALCIUM 9.1 7.7*   PHOS  --  2.9     Recent Labs     09/29/21  1355 09/30/21  0450   AST 42* 27   ALT 25 17   BILITOT 0.4 0.4   ALKPHOS 72 45     Recent Labs     09/29/21  1355   INR 1.02     No results for input(s): Nathan Gey in the last 72 hours. Urinalysis:      Lab Results   Component Value Date    NITRU Negative 09/29/2021    WBCUA 33 09/29/2021    RBCUA 1 09/29/2021    BLOODU TRACE 09/29/2021    SPECGRAV >1.030 09/29/2021    GLUCOSEU Negative 09/29/2021       Radiology:  XR CHEST PORTABLE   Final Result   NG tube courses into the stomach and acceptable. Hyperinflated lungs and old granulomatous disease are stable. XR CHEST PORTABLE   Final Result   1.   Hyperinflated lungs with old granulomatous disease. 2.  NG tube tip is near the GE junction and should be advanced by 10-15 cm. CTA ABDOMEN PELVIS W WO CONTRAST   Final Result   Unremarkable CT angiogram of the abdomen and pelvis. Patent mesenteric   arteries and veins. No findings of acute or chronic mesenteric ischemia. No   active gastrointestinal bleed. Extensive colonic diverticulosis is again noted, without evidence of acute   diverticulitis. Cholelithiasis. CTA ABDOMEN PELVIS W WO CONTRAST   Final Result   Motion limited study. No CT evidence of acute intra-abdominal process. No evidence of active GI   bleeding. Colonic diverticulosis with large amount stool throughout the colon. Severe atherosclerosis. Assessment/Plan:    Active Hospital Problems    Diagnosis     Acute lower GI hemorrhage [K92.2]     GI bleed [K92.2]        Acute GI bleed with Acute blood loss Anemia:  CTA: Negative for active bleed. GI consulted: r/o diverticular bleed vs. bleeding peptic ulcer. Continue Protonix drip, n.p.o. Added on bowel prep for endoscopy today. Monitor hgb every 6 hours. Goal hemoglobin greater than 7 g/dL. Maintain active type and screen. History of hypertension:  Nature BP on metoprolol. DVT Prophylaxis: SCD  Diet: Diet NPO Exceptions are: Ice Chips  Code Status: Full Code    PT/OT Eval Status: pending stabilization. Dispo - once medically stable.     Nawaf Walter MD

## 2021-09-30 NOTE — PROGRESS NOTES
2 units of PRBC ordered during procedure. First unit of blood started in PACU. Unable to scan unit number into Epic. Called and verified with blood bank and RN supervisor regarding situation. Transfusion Documentation downtime form obtained and completed with this RN and Orlin Lima RN. VSS. Blood transfusion started at 1446. Blood reached vein at 1447.

## 2021-09-30 NOTE — PROGRESS NOTES
Occupational Therapy/Physical [id number] [facility]    Patient MATT upon OT/PT evaluation attempt, will follow up for OT/PT assessment as time/schedule allows.     Thank you,    Juany Tao, MOT OTR/L [id number]  Robby Elder, PT, DPT #[id number]

## 2021-09-30 NOTE — PROGRESS NOTES
Pt transferred to room 3916 at this time from CT by Tashia Riley and this RN. Alert with no signs of distress. Monitors in place. Report given to Shazia Hyde. V/u and denies questions or further needs at this time.

## 2021-09-30 NOTE — PROCEDURES
Marrianne Dubin is a 80 y.o. female patient. 1. Hematochezia      Past Medical History:   Diagnosis Date    Chronic back pain     CKD (chronic kidney disease) stage 3, GFR 30-59 ml/min (HCC)     Dementia (HCC)     often non-verbal    Hypertension      Blood pressure (!) 148/77, pulse 68, temperature 97.5 °F (36.4 °C), temperature source Oral, resp. rate 16, height 5' 4\" (1.626 m), weight 122 lb (55.3 kg), SpO2 96 %, not currently breastfeeding. Central Line    Date/Time: 9/29/2021 10:11 PM  Performed by: Alisa Serrano MD  Authorized by: Alisa Serrano MD   Consent: The procedure was performed in an emergent situation. Verbal consent obtained. Written consent obtained. Risks and benefits: risks, benefits and alternatives were discussed  Consent given by: patient  Patient understanding: patient states understanding of the procedure being performed  Patient consent: the patient's understanding of the procedure matches consent given  Procedure consent: procedure consent matches procedure scheduled  Relevant documents: relevant documents present and verified  Test results: test results available and properly labeled  Site marked: the operative site was marked  Imaging studies: imaging studies available  Required items: required blood products, implants, devices, and special equipment available  Patient identity confirmed: hospital-assigned identification number and verbally with patient  Time out: Immediately prior to procedure a \"time out\" was called to verify the correct patient, procedure, equipment, support staff and site/side marked as required.   Indications: vascular access  Anesthesia: local infiltration    Anesthesia:  Local Anesthetic: lidocaine 1% with epinephrine  Anesthetic total: 10 mL    Sedation:  Patient sedated: no    Preparation: skin prepped with 2% chlorhexidine  Skin prep agent dried: skin prep agent completely dried prior to procedure  Sterile barriers: all five maximum sterile barriers

## 2021-09-30 NOTE — ANESTHESIA POSTPROCEDURE EVALUATION
Department of Anesthesiology  Postprocedure Note    Patient: Regina Guaman  MRN: [id number]  YOB: 1933  Date of evaluation: 9/30/2021  Time:  2:44 PM     Procedure Summary     Date: 09/30/21 Room / Location: [address] [place] [address]    Anesthesia Start: 1329 Anesthesia Stop: 3747    Procedures:       EGD DIAGNOSTIC ONLY (N/A Abdomen)      COLONOSCOPY DIAGNOSTIC (N/A ) Diagnosis: (GI BLEED)    Surgeons: Theodora Holland MD Responsible Provider: Jacquelyn Leggett MD    Anesthesia Type: MAC ASA Status: 3          Anesthesia Type: MAC    Melly Phase I:      Melly Phase II:      Last vitals: Reviewed and per EMR flowsheets.        Anesthesia Post Evaluation    Level of consciousness: awake  Complications: no

## 2021-09-30 NOTE — PROGRESS NOTES
4 Eyes Admission Assessment     I agree as the admission nurse that 2 RN's have performed a thorough Head to Toe Skin Assessment on the patient. ALL assessment sites listed below have been assessed on admission. Areas assessed by both nurses:[x]   Head, Face, and Ears   [x]   Shoulders, Back, and Chest  [x]   Arms, Elbows, and Hands   [x]   Coccyx, Sacrum, and Ischum  [x]   Legs, Feet, and Heels        Does the Patient have Skin Breakdown?   No         Matteo Prevention initiated:  No   Wound Care Orders initiated:  No      [facility] nurse consulted for Pressure Injury (Stage 3,4, Unstageable, DTI, NWPT, and Complex wounds):  No      Nurse 1 eSignature: Electronically signed by Jessica Abbott RN on 9/29/21 at 9:29 PM EDT    **SHARE this note so that the co-signing nurse is able to place an eSignature**    Nurse 2 eSignature: Electronically signed by Ronny Calero RN on 9/30/21 at 2:48 AM EDT

## 2021-09-30 NOTE — PLAN OF CARE
Problem: Falls - Risk of:  Goal: Will remain free from falls  Description: Will remain free from falls  Outcome: Ongoing  Goal: Absence of physical injury  Description: Absence of physical injury  Outcome: Ongoing     Problem: Skin Integrity:  Goal: Will show no infection signs and symptoms  Description: Will show no infection signs and symptoms  Outcome: Ongoing  Goal: Absence of new skin breakdown  Description: Absence of new skin breakdown  Outcome: Ongoing     Problem: Discharge Planning:  Goal: Participates in care planning  Description: Participates in care planning  Outcome: Ongoing  Goal: Discharged to appropriate level of care  Description: Discharged to appropriate level of care  Outcome: Ongoing     Problem: Activity Intolerance:  Goal: Ability to tolerate increased activity will improve  Description: Ability to tolerate increased activity will improve  Outcome: Ongoing     Problem: Anxiety/Stress:  Goal: Level of anxiety will decrease  Description: Level of anxiety will decrease  Outcome: Ongoing     Problem:  Bowel Function - Altered:  Goal: Bowel elimination is within specified parameters  Description: Bowel elimination is within specified parameters  Outcome: Ongoing     Problem: Fluid Volume - Deficit:  Goal: Absence of fluid volume deficit signs and symptoms  Description: Absence of fluid volume deficit signs and symptoms  Outcome: Ongoing  Goal: Electrolytes within specified parameters  Description: Electrolytes within specified parameters  Outcome: Ongoing     Problem: Mental Status - Impaired:  Goal: Absence of physical injury  Description: Absence of physical injury  Outcome: Ongoing  Goal: Absence of continued neurological deterioration signs and symptoms  Description: Absence of continued neurological deterioration signs and symptoms  Outcome: Ongoing  Goal: Mental status will be restored to baseline  Description: Mental status will be restored to baseline  Outcome: Ongoing     Problem: Mobility - Impaired:  Goal: Mobility will improve to maximum level  Description: Mobility will improve to maximum level  Outcome: Ongoing     Problem: Nutrition Deficit:  Goal: Ability to achieve adequate nutritional intake will improve  Description: Ability to achieve adequate nutritional intake will improve  Outcome: Ongoing     Problem: Pain:  Goal: Pain level will decrease  Description: Pain level will decrease  Outcome: Ongoing  Goal: Ability to notify healthcare provider of pain before it becomes unmanageable or unbearable will improve  Description: Ability to notify healthcare provider of pain before it becomes unmanageable or unbearable will improve  Outcome: Ongoing  Goal: Control of acute pain  Description: Control of acute pain  Outcome: Ongoing  Goal: Control of chronic pain  Description: Control of chronic pain  Outcome: Ongoing     Problem: Serum Glucose Level - Abnormal:  Goal: Ability to maintain appropriate glucose levels will improve to within specified parameters  Description: Ability to maintain appropriate glucose levels will improve to within specified parameters  Outcome: Ongoing     Problem: Skin Integrity - Impaired:  Goal: Will show no infection signs and symptoms  Description: Will show no infection signs and symptoms  Outcome: Ongoing  Goal: Absence of new skin breakdown  Description: Absence of new skin breakdown  Outcome: Ongoing     Problem: Sleep Pattern Disturbance:  Goal: Appears well-rested  Description: Appears well-rested  Outcome: Ongoing     Problem: Fluid Volume - Imbalance:  Goal: Will show no signs and symptoms of excessive bleeding  Description: Will show no signs and symptoms of excessive bleeding  Outcome: Ongoing  Goal: Absence of imbalanced fluid volume signs and symptoms  Description: Absence of imbalanced fluid volume signs and symptoms  Outcome: Ongoing     Problem: Nausea/Vomiting:  Goal: Ability to achieve adequate nutritional intake will improve  Description: Ability to achieve adequate nutritional intake will improve  Outcome: Ongoing  Goal: Absence of nausea/vomiting  Description: Absence of nausea/vomiting  Outcome: Ongoing  Goal: Able to drink  Description: Able to drink  Outcome: Ongoing  Goal: Able to eat  Description: Able to eat  Outcome: Ongoing

## 2021-09-30 NOTE — PROGRESS NOTES
Pt daughter Susie Rose ([phone])  notified of pt status and transfer to ICU. Home medications also verified with daughter and home med list updated.

## 2021-09-30 NOTE — FLOWSHEET NOTE
Rapid Response Quick Summary    Room: [place] [address]/1907-59    Assessment of concern / patient: RR called for pt with significant lower GIB    Physician involved: Dr Uche Kenny    Interventions:  Stat CT CTA with contrast, IVF increased to 150/hr.  Stat H/H    Disposition:  Transfer to ICU following CT scan

## 2021-09-30 NOTE — PROGRESS NOTES
Pt arrived from endo to PACU bay 2. Reported received from endo staff. Pt arouses to voice. Pt on 3L NC, NSR, and VSS. Will continue to monitor.

## 2021-10-01 PROBLEM — D62 ACUTE BLOOD LOSS ANEMIA: Status: ACTIVE | Noted: 2021-10-01

## 2021-10-01 PROBLEM — K57.31 DIVERTICULAR HEMORRHAGE: Status: ACTIVE | Noted: 2021-10-01

## 2021-10-01 LAB
A/G RATIO: 1.6 (ref 1.1–2.2)
ALBUMIN SERPL-MCNC: 2.9 G/DL (ref 3.4–5)
ALP BLD-CCNC: 37 U/L (ref 40–129)
ALT SERPL-CCNC: 16 U/L (ref 10–40)
ANION GAP SERPL CALCULATED.3IONS-SCNC: 7 MMOL/L (ref 3–16)
AST SERPL-CCNC: 31 U/L (ref 15–37)
BILIRUB SERPL-MCNC: 0.3 MG/DL (ref 0–1)
BUN BLDV-MCNC: 14 MG/DL (ref 7–20)
CALCIUM SERPL-MCNC: 7.2 MG/DL (ref 8.3–10.6)
CHLORIDE BLD-SCNC: 119 MMOL/L (ref 99–110)
CO2: 21 MMOL/L (ref 21–32)
CREAT SERPL-MCNC: 0.6 MG/DL (ref 0.6–1.2)
GFR AFRICAN AMERICAN: >60
GFR NON-AFRICAN AMERICAN: >60
GLOBULIN: 1.8 G/DL
GLUCOSE BLD-MCNC: 108 MG/DL (ref 70–99)
HCT VFR BLD CALC: 25 % (ref 36–48)
HCT VFR BLD CALC: 25.3 % (ref 36–48)
HCT VFR BLD CALC: 26.1 % (ref 36–48)
HCT VFR BLD CALC: 28.7 % (ref 36–48)
HEMOGLOBIN: 8.4 G/DL (ref 12–16)
HEMOGLOBIN: 8.5 G/DL (ref 12–16)
HEMOGLOBIN: 9 G/DL (ref 12–16)
HEMOGLOBIN: 9.6 G/DL (ref 12–16)
MAGNESIUM: 2 MG/DL (ref 1.8–2.4)
PHOSPHORUS: 2.6 MG/DL (ref 2.5–4.9)
POTASSIUM SERPL-SCNC: 3.6 MMOL/L (ref 3.5–5.1)
SODIUM BLD-SCNC: 147 MMOL/L (ref 136–145)
TOTAL PROTEIN: 4.7 G/DL (ref 6.4–8.2)

## 2021-10-01 PROCEDURE — 85014 HEMATOCRIT: CPT

## 2021-10-01 PROCEDURE — 97535 SELF CARE MNGMENT TRAINING: CPT

## 2021-10-01 PROCEDURE — 6370000000 HC RX 637 (ALT 250 FOR IP): Performed by: INTERNAL MEDICINE

## 2021-10-01 PROCEDURE — 2000000000 HC ICU R&B

## 2021-10-01 PROCEDURE — 84100 ASSAY OF PHOSPHORUS: CPT

## 2021-10-01 PROCEDURE — 36592 COLLECT BLOOD FROM PICC: CPT

## 2021-10-01 PROCEDURE — 80053 COMPREHEN METABOLIC PANEL: CPT

## 2021-10-01 PROCEDURE — 97530 THERAPEUTIC ACTIVITIES: CPT

## 2021-10-01 PROCEDURE — 36591 DRAW BLOOD OFF VENOUS DEVICE: CPT

## 2021-10-01 PROCEDURE — C9113 INJ PANTOPRAZOLE SODIUM, VIA: HCPCS | Performed by: INTERNAL MEDICINE

## 2021-10-01 PROCEDURE — 97166 OT EVAL MOD COMPLEX 45 MIN: CPT

## 2021-10-01 PROCEDURE — 2580000003 HC RX 258: Performed by: INTERNAL MEDICINE

## 2021-10-01 PROCEDURE — 97162 PT EVAL MOD COMPLEX 30 MIN: CPT

## 2021-10-01 PROCEDURE — 94761 N-INVAS EAR/PLS OXIMETRY MLT: CPT

## 2021-10-01 PROCEDURE — 2580000003 HC RX 258: Performed by: HOSPITALIST

## 2021-10-01 PROCEDURE — 83735 ASSAY OF MAGNESIUM: CPT

## 2021-10-01 PROCEDURE — 97116 GAIT TRAINING THERAPY: CPT

## 2021-10-01 PROCEDURE — 85018 HEMOGLOBIN: CPT

## 2021-10-01 PROCEDURE — 6360000002 HC RX W HCPCS: Performed by: INTERNAL MEDICINE

## 2021-10-01 RX ORDER — AMLODIPINE BESYLATE 5 MG/1
5 TABLET ORAL DAILY
Status: DISCONTINUED | OUTPATIENT
Start: 2021-10-01 | End: 2021-10-07 | Stop reason: HOSPADM

## 2021-10-01 RX ORDER — LOSARTAN POTASSIUM 100 MG/1
100 TABLET ORAL DAILY
Status: DISCONTINUED | OUTPATIENT
Start: 2021-10-01 | End: 2021-10-03

## 2021-10-01 RX ADMIN — AMLODIPINE BESYLATE 5 MG: 5 TABLET ORAL at 17:40

## 2021-10-01 RX ADMIN — SODIUM CHLORIDE 8 MG/HR: 9 INJECTION, SOLUTION INTRAVENOUS at 05:28

## 2021-10-01 RX ADMIN — METOPROLOL SUCCINATE 50 MG: 50 TABLET, EXTENDED RELEASE ORAL at 08:27

## 2021-10-01 RX ADMIN — SODIUM CHLORIDE, POTASSIUM CHLORIDE, SODIUM LACTATE AND CALCIUM CHLORIDE: 600; 310; 30; 20 INJECTION, SOLUTION INTRAVENOUS at 03:57

## 2021-10-01 RX ADMIN — SODIUM CHLORIDE 8 MG/HR: 9 INJECTION, SOLUTION INTRAVENOUS at 15:46

## 2021-10-01 RX ADMIN — LOSARTAN POTASSIUM 100 MG: 100 TABLET, FILM COATED ORAL at 17:40

## 2021-10-01 RX ADMIN — Medication 10 ML: at 21:34

## 2021-10-01 RX ADMIN — Medication 10 ML: at 08:28

## 2021-10-01 ASSESSMENT — PAIN SCALES - GENERAL
PAINLEVEL_OUTOF10: 0

## 2021-10-01 NOTE — PROGRESS NOTES
Pt resting though the night. VS appear to be stabilizing. Pt remains confused and pulls at restraints.

## 2021-10-01 NOTE — PLAN OF CARE
Problem: Falls - Risk of:  Goal: Will remain free from falls  Description: Will remain free from falls  Outcome: Ongoing  Goal: Absence of physical injury  Description: Absence of physical injury  Outcome: Ongoing     Problem: Skin Integrity:  Goal: Will show no infection signs and symptoms  Description: Will show no infection signs and symptoms  Outcome: Ongoing  Goal: Absence of new skin breakdown  Description: Absence of new skin breakdown  Outcome: Ongoing     Problem: Discharge Planning:  Goal: Participates in care planning  Description: Participates in care planning  Outcome: Ongoing  Goal: Discharged to appropriate level of care  Description: Discharged to appropriate level of care  Outcome: Ongoing     Problem: Activity Intolerance:  Goal: Ability to tolerate increased activity will improve  Description: Ability to tolerate increased activity will improve  Outcome: Ongoing     Problem: Anxiety/Stress:  Goal: Level of anxiety will decrease  Description: Level of anxiety will decrease  Outcome: Ongoing     Problem:  Bowel Function - Altered:  Goal: Bowel elimination is within specified parameters  Description: Bowel elimination is within specified parameters  Outcome: Ongoing     Problem: Fluid Volume - Deficit:  Goal: Absence of fluid volume deficit signs and symptoms  Description: Absence of fluid volume deficit signs and symptoms  Outcome: Ongoing  Goal: Electrolytes within specified parameters  Description: Electrolytes within specified parameters  Outcome: Ongoing     Problem: Mental Status - Impaired:  Goal: Absence of physical injury  Description: Absence of physical injury  Outcome: Ongoing  Goal: Absence of continued neurological deterioration signs and symptoms  Description: Absence of continued neurological deterioration signs and symptoms  Outcome: Ongoing  Goal: Mental status will be restored to baseline  Description: Mental status will be restored to baseline  Outcome: Ongoing     Problem: Mobility - Impaired:  Goal: Mobility will improve to maximum level  Description: Mobility will improve to maximum level  Outcome: Ongoing     Problem: Nutrition Deficit:  Goal: Ability to achieve adequate nutritional intake will improve  Description: Ability to achieve adequate nutritional intake will improve  Outcome: Ongoing     Problem: Pain:  Goal: Pain level will decrease  Description: Pain level will decrease  Outcome: Ongoing  Goal: Ability to notify healthcare provider of pain before it becomes unmanageable or unbearable will improve  Description: Ability to notify healthcare provider of pain before it becomes unmanageable or unbearable will improve  Outcome: Ongoing  Goal: Control of acute pain  Description: Control of acute pain  Outcome: Ongoing  Goal: Control of chronic pain  Description: Control of chronic pain  Outcome: Ongoing     Problem: Serum Glucose Level - Abnormal:  Goal: Ability to maintain appropriate glucose levels will improve to within specified parameters  Description: Ability to maintain appropriate glucose levels will improve to within specified parameters  Outcome: Ongoing     Problem: Skin Integrity - Impaired:  Goal: Will show no infection signs and symptoms  Description: Will show no infection signs and symptoms  Outcome: Ongoing  Goal: Absence of new skin breakdown  Description: Absence of new skin breakdown  Outcome: Ongoing     Problem: Sleep Pattern Disturbance:  Goal: Appears well-rested  Description: Appears well-rested  Outcome: Ongoing     Problem: Fluid Volume - Imbalance:  Goal: Will show no signs and symptoms of excessive bleeding  Description: Will show no signs and symptoms of excessive bleeding  Outcome: Ongoing  Goal: Absence of imbalanced fluid volume signs and symptoms  Description: Absence of imbalanced fluid volume signs and symptoms  Outcome: Ongoing     Problem: Nausea/Vomiting:  Goal: Ability to achieve adequate nutritional intake will improve  Description: Ability to achieve adequate nutritional intake will improve  Outcome: Ongoing  Goal: Absence of nausea/vomiting  Description: Absence of nausea/vomiting  Outcome: Ongoing  Goal: Able to drink  Description: Able to drink  Outcome: Ongoing  Goal: Able to eat  Description: Able to eat  Outcome: Ongoing     Problem: Non-Violent Restraints  Goal: Removal from restraints as soon as assessed to be safe  Outcome: Ongoing  Goal: No harm/injury to patient while restraints in use  Outcome: Ongoing  Goal: Patient's dignity will be maintained  Outcome: Ongoing

## 2021-10-01 NOTE — PROGRESS NOTES
PT alert to self and agreeable to treatments at this pierre, but requires constant redirection. Pt becoming increasingly restless and attempting to leave bed. Family at bedside, but leaving soon. Orders to restart restraints and PRN meds to calm down pt obtained.

## 2021-10-01 NOTE — CARE COORDINATION
Discharge Planning Initial Assessment:      RN Case Manager/ discharge planner met with patient, and the pt's dtr Kasia Sanders to discuss reason for admission, current living situation, and potential needs at the time of discharge    Demographics/Insurance verified Yes    Current type of dwellin story home    Patient from ECF/SW confirmed with: NA    Living arrangements: pt lives with her dtr     Level of function/Support: Amb/Ind    PCP: Dr Kayren Siemens     Last Visit to PCP: 1 month    DME: cane    Active with any community resources/agencies/skilled home care: NA    Medication compliance issues: NA    Financial issues that could impact healthcare: NA        Tentative discharge plan: Home with Davion Calrk    Discussed and provided facilities of choice if transition to a skilled nursing facility is required at the time of discharge      Discussed with patient and/or family that on the day of discharge home tentative time of discharge will be between 10 AM and noon. Transportation at the time of discharge: pt's dtr     Case management will continue to follow the pt and help with any DC needs that may arise. Please contact  if any needs are identified.      Sukumar Harvey RN Case Manager

## 2021-10-01 NOTE — PROGRESS NOTES
Occupational Therapy   Occupational Therapy Initial Assessment  Date: 10/1/2021   Patient Name: My Lew  MRN: [id number]     : 1933    Date of Service: 10/1/2021    Discharge Recommendations: My Lew scored a 17/24 on the AM-PAC ADL Inpatient form. Current research shows that an AM-PAC score of 18 or greater is typically associated with a discharge to the patient's home setting. However; based on the patient's 24 hour assist/supervision at home and anticipation for continued improvement in functional performance with acute OT, it is recommended that the patient have 2-3 sessions per week of Occupational Therapy at d/c to increase the patient's independence. At this time, this patient demonstrates the endurance and safety to discharge home with home services (home vs OP services) and a follow up treatment frequency of 2-3x/wk. Please see assessment section for further patient specific details. If patient discharges prior to next session this note will serve as a discharge summary. Please see below for the latest assessment towards goals. HOME HEALTH CARE: LEVEL 3 SAFETY     - Initial home health evaluation to occur within 24-48 hours, in patient home   - Therapy evaluations in home within 24-48 hours of discharge; including DME and home safety   - Frontload therapy 5 days, then 3x a week   - Therapy to evaluate if patient has [address] needs for personal care   -  evaluation within 24-48 hours, includes evaluation of resources and insurance to determine [place], [place], LTC, and Medicaid options        OT Equipment Recommendations  Equipment Needed: No    Assessment   Performance deficits / Impairments: Decreased functional mobility ; Decreased endurance;Decreased ADL status; Decreased high-level IADLs;Decreased balance;Decreased cognition  Assessment: Patient presents below baseline d/t above deficits, OT indicated to maximize safety/independence with ADL and IADL  Treatment Diagnosis: Above deficits associated with hematochezia  Prognosis: Good  Decision Making: Medium Complexity  Exam: as above  OT Education: OT Role;Plan of Care  Patient Education: eval, discharge - pt not an independent learner, requires reinforcement for carryover  REQUIRES OT FOLLOW UP: Yes  Activity Tolerance  Activity Tolerance: Patient Tolerated treatment well;Treatment limited secondary to decreased cognition  Safety Devices  Safety Devices in place: Yes  Type of devices: All fall risk precautions in place; Left in chair;Call light within reach;Nurse notified; Chair alarm in place;Gait belt           Patient Diagnosis(es): The primary encounter diagnosis was Hematochezia. A diagnosis of Gastrointestinal hemorrhage associated with intestinal diverticulosis was also pertinent to this visit. has a past medical history of Chronic back pain, CKD (chronic kidney disease) stage 3, GFR 30-59 ml/min ([facility]), Dementia ([facility] [address] and Hypertension. has a past surgical history that includes Toe Surgery; Hysterectomy, total abdominal; hernia repair (Left, 4/2/2020); Upper gastrointestinal endoscopy (N/A, 9/30/2021); and Colonoscopy (N/A, 9/30/2021). Treatment Diagnosis: Above deficits associated with hematochezia      Restrictions  Restrictions/Precautions  Restrictions/Precautions: Fall Risk (high fall risk)  Position Activity Restriction  Other position/activity restrictions: 80 y.o. female who presented with GI bleed. Patient was at adult  when she noted some clots. Patient said noted some bright red blood and clots in the stool. On the depends. Patient does take aspirin. Denies any significant symptoms. A couple episodes today. No dyspnea no chest pain no history of GI bleed previously recently. 9/30: pancolonic diverticulosis with blood throughout colon. EGD normal. Difficult colon with 2 episodes of severe bradycardia requiring w/d of scope.  CTA x 3 neg for active bleeding    Subjective   General  Chart Reviewed: Yes  Diagnosis: Hematochezia  Subjective  Subjective: Patient supine in bed upon arrival, pleasantly confused agreeable to evaluation. Dtr present. Patient Currently in Pain: Denies  Pain Assessment  Pain Assessment: Faces  Pain Level: 0  Pre Treatment Pain Screening  Intervention List: Patient able to continue with treatment;Nurse/Physician notified  Vital Signs  Patient Currently in Pain: Denies     Social/Functional History  Social/Functional History  Lives With: Daughter (moved in with dtr 1 month ago, used to live in senior apt, started wandering)  Type of Home: House  Home Layout: One level (pt does not go to basement)  Home Access: Stairs to enter without rails  Entrance Stairs - Number of Steps: 1 [address]  Bathroom Shower/Tub: Shower chair with back, Tub/Shower unit  Bathroom Toilet: Standard (looking into riser)  Bathroom Equipment: Grab bars in shower, Hand-held shower  Bathroom Accessibility: Walker accessible  Home Equipment: Cane, Sock aid, Long-handled shoehorn, Rolling walker, Alert Button, Grab bars  ADL Assistance: Needs assistance (supervision/set up for sponge bath and dressing, is assisted if taking shower)  Homemaking Assistance: Needs assistance (pt folds clothes, gets MoW, dtr does other IADL)  Ambulation Assistance: Independent (wide based cane)  Transfer Assistance: Independent  Active : No  Patient's  Info: dtr transports  Leisure & Hobbies: word search puzzles, ar[employer]a [employer], game shows  Additional Comments: 3x/week goes to adult . No falls in last 6 months       Objective   Vision: Impaired  Vision Exceptions: Wears glasses for reading (hx cataract sx)  Hearing: Exceptions to [facility]  Hearing Exceptions: Bilateral hearing aid    Orientation  Overall Orientation Status: Impaired  Orientation Level: Oriented to person;Disoriented to time;Disoriented to place; Disoriented to situation     Balance  Sitting Balance: Stand by assistance  Standing Balance: Contact guard assistance  Functional Mobility  Functional - Mobility Device: Rolling Walker  Activity: Other  Assist Level: Minimal assistance  Functional Mobility Comments: min A with cane, requires mod A when completing 180* turn secondary to LOB, completes ambulation with RW and min A for navigation  ADL  Feeding: Verbal cueing;Setup;Stand by assistance (using spoon to scoop broth)  Grooming: Stand by assistance;Setup;Verbal cueing (washing face)  UE Bathing: Stand by assistance;Verbal cueing;Setup (VC for thoroughness)  LE Bathing: Verbal cueing;Setup;Minimal assistance (A for bathing B feet)  UE Dressing:  (gown change)  LE Dressing: Minimal assistance (donning depends)  Additional Comments: Pt seated at recliner chair for bathing with above assist. Requires min-mod VC for remaining seated d/t intermittent impulsivitiy to stand w/o assist  Tone RUE  RUE Tone: Normotonic  Tone LUE  LUE Tone: Normotonic  Coordination  Movements Are Fluid And Coordinated: Yes     Bed mobility  Supine to Sit: Minimal assistance  Scooting: Minimal assistance  Transfers  Stand Step Transfers: Moderate assistance (Impulsive with initial sit > stand, starts to step and scoot over from EOB > recliner, requires mod A for redirection to seated surface of recliner as pt initially sat at armrest)  Sit to stand: Minimal assistance (x4; x1 from EOB (min/mod A), x3 from recliner (min A))  Stand to sit: Minimal assistance (x4, to recliner (A for eccentric control))     Cognition  Overall Cognitive Status: Exceptions  Arousal/Alertness: Appropriate responses to stimuli  Following Commands: Follows one step commands with repetition  Attention Span: Attends with cues to redirect; Difficulty attending to directions  Memory: Decreased recall of recent events;Decreased short term memory;Decreased long term memory  Safety Judgement: Decreased awareness of need for assistance;Decreased awareness of need for safety  Problem Solving: Assistance required to generate solutions;Assistance required to implement solutions;Decreased awareness of errors  Insights: Decreased awareness of deficits  Initiation: Requires cues for some  Sequencing: Requires cues for some  Perception  Overall Perceptual Status: WFL     Sensation  Overall Sensation Status: WFL        LUE AROM (degrees)  LUE AROM : WFL  RUE AROM (degrees)  RUE AROM : WFL  LUE Strength  Gross LUE Strength: WFL  RUE Strength  Gross RUE Strength: WFL                   Plan   Plan  Times per week: 3-5  Times per day: Daily  Current Treatment Recommendations: Strengthening, Patient/Caregiver Education & Training, Equipment Evaluation, Education, & procurement, Balance Training, Functional Mobility Training, Self-Care / ADL, Safety Education & Training    G-Code     OutComes Score                                                  AM-PAC Score        AM-[facility] Inpatient Daily Activity Raw Score: 17 (10/01/21 1245)  AM-PAC Inpatient ADL T-Scale Score : 37.26 (10/01/21 1245)  ADL Inpatient CMS 0-100% Score: 50.11 (10/01/21 1245)  ADL Inpatient CMS G-Code Modifier : CK (10/01/21 1245)    Goals  Short term goals  Time Frame for Short term goals: discharge  Short term goal 1: UB ADL mod I  Short term goal 2: LB ADL supervision  Short term goal 3: Fxl transfers supervision  Short term goal 4: Fxl mob supervision  Short term goal 5: Grooming supervision  Long term goals  Time Frame for Long term goals : LTG=STG       Therapy Time   Individual Concurrent Group Co-treatment   Time In 0946         Time Out 1042         Minutes 56            Timed Code Treatment Minutes:   41 minutes    Total Treatment Minutes:  56 minutes    Amanda Must, [address] [place] OTR/L [id number]    Amanda Must, OT

## 2021-10-01 NOTE — DISCHARGE INSTR - COC
Continuity of Care Form    Patient Name: Shahana Bolton   :  1933  MRN:  [id number]    Admit date:  2021  Discharge date: 10/07/21    Code Status Order: Full Code   Advance Directives:   [facility] Documentation       Date/Time Healthcare Directive Type of Healthcare Directive Copy in [address] [facility] [address] Agent's Name Healthcare Agent's Phone Number    21 130  No, patient does not have an advance directive for healthcare treatment                      Admitting Physician:  Phil Herr MD  PCP: Sharlene Diop MD    Dis[facility]ing [facility]5596/0984-65  Discharging Unit Phone Number: [phone]    Emergency Contact:   Extended Emergency Contact Information  Primary Emergency Contact:  Tomasz Hidalgo of [address] [place] [address] Phone: [phone]  Mobile Phone: [phone]  Relation: Child  Secondary Emergency Contact: [facility] Phone: [phone]  Relation: Child    Past Surgical History:  Past Surgical History:   Procedure Laterality Date    COLONOSCOPY N/A 2021    COLONOSCOPY DIAGNOSTIC performed by El Reid MD at [address] [facility] 2020    REPAIR OF INCARCERATED LEFT INGUINAL HERNIA WITH  MESH performed by Kendy Bonner MD at [address] [facility] [address] [place], TOTAL ABDOMINAL      TOE SURGERY      UPPER GASTROINTESTINAL ENDOSCOPY N/A 2021    EGD DIAGNOSTIC ONLY performed by El Reid MD at [address] [facility] ENDOSCOPY       Immunization History:   Immunization History   Administered Date(s) Administered    COVID-19, Moderna, PF, 100mcg/0.5mL 2020, 2021, 2021    Influenza Vaccine, unspecified formulation 2016    Influenza Whole 2015    Influenza, High Dose (Fluzone 65 yrs and older) 2015, 2016, 10/03/2017, 2018, 2019    Influenza, High-dose, Quadv, 65 yrs +, IM (Fluzone) 10/20/2020    Summary (Last 24 hours) at 10/1/2021 1422  Last data filed at 10/1/2021 0530  Gross per 24 hour   Intake 3208.88 ml   Output 1050 ml   Net 2158.88 ml     I/O last 3 completed shifts: In: 3708.9 [I.V.:2209.3; Blood:686.7; IV Piggyback:812.9]  Out: 1450 [Urine:1450]    Safety Concerns:     History of Falls (last 30 days)    Impairments/Disabilities:      None    Nutrition Therapy:  Current Nutrition Therapy:   - Oral Diet:  General    Routes of Feeding: Oral  Liquids: No Restrictions  Daily Fluid Restriction: no  Last Modified Barium Swallow with Video (Video Swallowing Test): not done    Treatments at the Time of Hospital Discharge:   Respiratory Treatments: NA  Oxygen Therapy:  is not on home oxygen therapy.   Ventilator:    - No ventilator support    Rehab Therapies: sn,pt,ot,st  Weight Bearing Status/Restrictions: No weight bearing restirctions  Other Medical Equipment (for information only, NOT a DME order):  walker  Other Treatments: HOME HEALTH CARE: LEVEL 3 SAFETY        -Initial home health evaluation to occur within 24-48 hours, in patient home    -Home health agency to establish plan of care for patient over 60 day period    -Medication Reconciliation    -PT/OT/Speech evaluations in home within 24-48 hours of discharge; including  -DME and home safety    -Frontload therapy 5 days, then 3x a week    -OT to evaluate if patient has [address] needs for personal care    - evaluation within 24-48 hours, includes evaluation of resources   and insurance to determine [place], [place], LTC, and Medicaid options    -PCP Visit scheduled within three to seven days of discharge    -Telehealth-Homecare Vitals(If patient is agreeable and meets guidelines)   -May Have Physical Therapy Start Of Care        Patient's personal belongings (please select all that are sent with patient):  Dentures upper, Rosi    RN SIGNATURE:  Electronically signed by Otoniel Franco RN on 10/7/21 at 10:14 AM EDT    CASE MANAGEMENT/SOCIAL WORK SECTION    Inpatient Status Date: ***    Readmission Risk Assessment Score:  Readmission Risk              Risk of Unplanned Readmission:  14           Discharging to Facility/ Agency   Name: Magen Holland will call for Appointment  Phone: [phone]  Fax: [phone]    I also gave a flyer for the [place] [facility] for the family to possibly get additional help. Dialysis Facility (if applicable)   · Name:  · Address:  · Dialysis Schedule:  · Phone:  · Fax:    / signature: Electronically signed by Willard Ojeda RN on 10/6/21 at 10:51 AM EDT    PHYSICIAN SECTION    Prognosis: Good    Condition at Discharge: Stable    Rehab Potential (if transferring to Rehab): Good    Recommended Labs or Other Treatments After Discharge:     Physician Certification: I certify the above information and transfer of Yoanna Lynn  is necessary for the continuing treatment of the diagnosis listed and that she requires Home Care for greater 30 days.      Update Admission H&P: No change in H&P    PHYSICIAN SIGNATURE:  Electronically signed by Meli Quinn MD on 10/6/21 at 6:55 AM EDT

## 2021-10-01 NOTE — PROGRESS NOTES
Hospitalist Progress Note      PCP: Tamar Ness MD    Date of Admission: 9/29/2021    Chief Complaint: rectal Bleeding    Hospital Course: Trevorthanh Matt is a 80 y.o. female who presented with GI bleed. Patient was at adult  when she noted some clots. Patient said noted some bright red blood and clots in the stool. On the depends. Patient does take aspirin. Denies any significant symptoms. A couple episodes today. No dyspnea no chest pain no history of GI bleed previously recently. Subjective: Patient seen and examined. Profuse bright red bleeding per rectum overnight. Denies any new complaints. No abdominal or rectal pain, fever or chills.          Medications:  Reviewed    Infusion Medications    sodium chloride Stopped (09/30/21 1430)    sodium chloride      sodium chloride      norepinephrine Stopped (10/01/21 0015)    sodium chloride      pantoprozole (PROTONIX) infusion 8 mg/hr (10/01/21 0530)    lactated ringers 100 mL/hr at 10/01/21 0530     Scheduled Medications    influenza virus vaccine  0.5 mL IntraMUSCular Prior to discharge    metoprolol succinate  50 mg Oral Daily    sodium chloride flush  5-40 mL IntraVENous 2 times per day    [START ON 10/2/2021] pantoprazole  40 mg Oral QAM AC     PRN Meds: sodium chloride, ziprasidone, melatonin, sodium chloride, sodium chloride flush, sodium chloride, ondansetron **OR** ondansetron, polyethylene glycol, acetaminophen **OR** acetaminophen, potassium chloride, sodium phosphate IVPB **OR** sodium phosphate IVPB, magnesium sulfate      Intake/Output Summary (Last 24 hours) at 10/1/2021 1107  Last data filed at 10/1/2021 0530  Gross per 24 hour   Intake 3708.88 ml   Output 1450 ml   Net 2258.88 ml       Physical Exam Performed:    BP (!) 141/55   Pulse 99   Temp 98.2 °F (36.8 °C) (Temporal)   Resp 17   Ht 5' 4\" (1.626 m)   Wt 116 lb 6.5 oz (52.8 kg)   SpO2 100%   BMI 19.98 kg/m²     General appearance: No apparent distress, appears stated age and cooperative. HEENT: Pupils equal, round, and reactive to light. Conjunctivae clear. Neck: Supple, with full range of motion. No jugular venous distention. Trachea midline. Respiratory:  Normal respiratory effort. Clear to auscultation, bilaterally without Rales/Wheezes/Rhonchi. Cardiovascular: Regular rate and rhythm with normal S1/S2 without murmurs, rubs or gallops. Abdomen: Soft, non-tender, non-distended with normal bowel sounds. Musculoskeletal: No clubbing, cyanosis or edema bilaterally. Full range of motion without deformity. Skin: Skin color, texture, turgor normal.  No rashes or lesions. Neurologic:  Neurovascularly intact without any focal sensory/motor deficits. Cranial nerves: II-XII intact, grossly non-focal.  Psychiatric: Alert and oriented, thought content appropriate, normal insight  Capillary Refill: Brisk,3 seconds, normal   Peripheral Pulses: +2 palpable, equal bilaterally     Right femoral triple-lumen catheter. Labs:   Recent Labs     09/29/21  1355 09/29/21  2103 09/30/21  2230 10/01/21  0035 10/01/21  0550   WBC 7.2  --   --   --   --    HGB 12.1   < > 7.9* 9.6* 9.0*   HCT 37.0   < > 23.4* 28.7* 26.1*     --   --   --   --     < > = values in this interval not displayed. Recent Labs     09/29/21  1355 09/30/21  0450 10/01/21  0550    141 147*   K 4.6 4.1 3.6    112* 119*   CO2 23 22 21   BUN 18 18 14   CREATININE 0.7 0.6 0.6   CALCIUM 9.1 7.7* 7.2*   PHOS  --  2.9 2.6     Recent Labs     09/29/21  1355 09/30/21  0450 10/01/21  0550   AST 42* 27 31   ALT 25 17 16   BILITOT 0.4 0.4 0.3   ALKPHOS 72 45 37*     Recent Labs     09/29/21  1355   INR 1.02     No results for input(s): CKTOTAL, TROPONINI in the last 72 hours.     Urinalysis:      Lab Results   Component Value Date    NITRU Negative 09/29/2021    WBCUA 33 09/29/2021    RBCUA 1 09/29/2021    BLOODU TRACE 09/29/2021    SPECGRAV >1.030 09/29/2021    GLUCOSEU Negative 09/29/2021 Radiology:  CTA ABDOMEN PELVIS W WO CONTRAST   Final Result   No active GI bleed. However, there is new lack of haustration with mild   diffuse mucosal enhancement throughout the distal transverse colon as well as   throughout the descending and sigmoid colon, findings which may be seen with   acute mesenteric ischemia. No pneumatosis, free fluid, bowel perforation, or   mesenteric or portal venous gas. Extensive colonic diverticulosis, without evidence of acute diverticulitis. XR CHEST PORTABLE   Final Result   NG tube courses into the stomach and acceptable. Hyperinflated lungs and old granulomatous disease are stable. XR CHEST PORTABLE   Final Result   1. Hyperinflated lungs with old granulomatous disease. 2.  NG tube tip is near the GE junction and should be advanced by 10-15 cm. CTA ABDOMEN PELVIS W WO CONTRAST   Final Result   Unremarkable CT angiogram of the abdomen and pelvis. Patent mesenteric   arteries and veins. No findings of acute or chronic mesenteric ischemia. No   active gastrointestinal bleed. Extensive colonic diverticulosis is again noted, without evidence of acute   diverticulitis. Cholelithiasis. CTA ABDOMEN PELVIS W WO CONTRAST   Final Result   Motion limited study. No CT evidence of acute intra-abdominal process. No evidence of active GI   bleeding. Colonic diverticulosis with large amount stool throughout the colon. Severe atherosclerosis. Assessment/Plan:    Active Hospital Problems    Diagnosis     Diverticulosis of colon with hemorrhage of large intestine [K57.31]     Acute blood loss anemia [D62]     Acute lower GI hemorrhage [K92.2]     Hypertension [I10]        Acute GI bleed due to Diverticular bleed with Acute blood loss Anemia:  CTA: Negative for active bleed x 3. GI consulted: s/p EGD (Normal) & colonoscopy (pancolonic diverticulosis with hemorrhage) 9/30/21.    Continue Protonix drip, clear liquid diet. Typed RBC scan if bleeding recurs. s/p 3 units of PRBC's. Monitor hgb every 6 hours. Goal hemoglobin greater than 7 g/dL. Maintain active type and screen. GI recommending surgical consult if requiring more transfusions. Hypotension:  history of hypertension. Started on Levophed due to hypotension with brisk bleeding. Wean off pressors as tolerated. Resume metoprolol. Mild hyponatremia with hypochloremia:  Monitor with free water intake. DVT Prophylaxis: SCD  Diet: ADULT DIET; Clear Liquid  Code Status: Full Code    PT/OT Eval Status: pending stabilization. Dispo - once medically stable.     Nawaf Walter MD

## 2021-10-01 NOTE — PROGRESS NOTES
Gastroenterology Progress Note            Ellis Patel is a 80 y.o. female patient. 1. Hematochezia    2. Gastrointestinal hemorrhage associated with intestinal diverticulosis        SUBJECTIVE:  Pt wo complaints    ROS:  Cardiovascular ROS: no chest pain or dyspnea on exertion  Gastrointestinal ROS: no abdominal pain, change in bowel habits, or black or bloody stools  Respiratory ROS: no cough, shortness of breath, or wheezing    Physical    VITALS:  /60   Pulse 99   Temp 98.2 °F (36.8 °C) (Temporal)   Resp 17   Ht 5' 4\" (1.626 m)   Wt 116 lb 6.5 oz (52.8 kg)   SpO2 100%   BMI 19.98 kg/m²   TEMPERATURE:  Current - Temp: 98.2 °F (36.8 °C); Max - Temp  Av.6 °F (36.4 °C)  Min: 97 °F (36.1 °C)  Max: 98.7 °F (37.1 °C)    NAD  RRR, Nl s1s2  Lungs CTA Bilaterally, normal effort  Abdomen soft, ND, NT, no HSM, Bowel sounds normal  AAOx3, No asterixis     Data    CBC:   Lab Results   Component Value Date    WBC 7.2 2021    RBC 4.45 2021    HGB 9.0 10/01/2021    HCT 26.1 10/01/2021    MCV 83.3 2021    MCH 27.2 2021    MCHC 32.6 2021    RDW 16.3 2021     2021    MPV 8.5 2021     Hepatic Function Panel:    Lab Results   Component Value Date    ALKPHOS 37 10/01/2021    ALT 16 10/01/2021    AST 31 10/01/2021    PROT 4.7 10/01/2021    BILITOT 0.3 10/01/2021    BILIDIR <0.2 2020    IBILI see below 2020           ASSESSMENT     Brisk lower GI bleed yesterday: strongly suspect diverticular bleed. pancolonic diverticulosis with blood throughout colon. EGD normal. Difficult colon with 2 episodes of severe bradycardia requiring w/d of scope. CTA x 3 neg for active bleeding. Hg is now 9.0 after 3U. No signs of active bleeding this am. VSS. PLAN    If overt rebleed occurs, will get a tagged scan to help localize source of bleeding. Surgical consult if requires a 4th unit of blood or the tagged scan helps to localize the site.   Clear devaughn Yan MD

## 2021-10-01 NOTE — PROGRESS NOTES
Visited with patient and daughter while rounding on unit. Both were welcoming. Daughter shared topic of Bible study in which she is currently participating. Patient was quiet and appeared to be resting comfortably. Spiritual support provided through active listening and pastoral presence. No further needs expressed by patient and/or daughter at this time.

## 2021-10-01 NOTE — PROGRESS NOTES
Pt had another episode of bright red BM about 22:30 that flooded bed. Pt became hypotensive, increasingly drowsy imedeatly following BM. Team led and MD on contacted. Stat H&H obtained, fluid bolus, 1 Unit of packed RBC and levophed started. Transfusion completed without incident. BP stable. Levo titrated off. H&H improved. PT resting comfortably at this time. Daughter contacted and updated on current condition.  MD states GI will revaluate in AM.

## 2021-10-01 NOTE — PROGRESS NOTES
Physical Therapy    Facility/Department: [facility] ICU  Initial Assessment    NAME: Anne Wesley  : 1933  MRN: [id number]    Date of Service: 10/1/2021    Discharge Recommendations:  PT Equipment Recommendations  Equipment Needed: Yes  Other: Gait belt (discussed with family about purchasing in the community prior to pt d/c); family has RW at home, recommend pt use RW initially upon d/c and progress to [facility] with home therapy     Anne Wesley scored a 14/24 on the AM-PAC short mobility form. Current research shows that an AM-PAC score of 18 or greater is typically associated with a discharge to the patient's home setting. Based on the patient's AM-PAC score and their current functional mobility deficits, it is recommended that the patient have 2-3 sessions per week of Physical Therapy at d/c to increase the patient's independence. At this time, this patient demonstrates the endurance and safety to discharge home with 24/ assist and home services and a follow up treatment frequency of 2-3x/wk. Please see assessment section for further patient specific details. If patient discharges prior to next session this note will serve as a discharge summary. Please see below for the latest assessment towards goals. HOME HEALTH CARE: LEVEL 3 SAFETY  - Initial home health evaluation to occur within 24-48 hours, in patient home   - Therapy evaluations in home within 24-48 hours of discharge; including DME and home safety   - Frontload therapy 5 days, then 3x a week   - Therapy to evaluate if patient has [address] needs for personal care   -  evaluation within 24-48 hours, includes evaluation of resources and insurance to determine AL, [place], LTC, and Medicaid options     Assessment   Body structures, Functions, Activity limitations: Decreased functional mobility ; Decreased safe awareness;Decreased balance;Decreased coordination;Decreased ADL status  Assessment: Pt is an 81 yo female admitted to [facility] for bath and dressing, is assisted if taking shower)  Homemaking Assistance: Needs assistance (pt folds clothes, gets MoW, dtr does other IADL)  Ambulation Assistance: Independent (wide based cane)  Transfer Assistance: Independent  Active : No  Patient's  Info: dtr transports  Leisure & Hobbies: word search puzzles, archana [employer], game shows  Additional Comments: 3x/week goes to adult . No falls in last 6 months     Cognition   Cognition  Overall Cognitive Status: Exceptions  Arousal/Alertness: Appropriate responses to stimuli  Following Commands: Follows one step commands with repetition  Attention Span: Attends with cues to redirect; Difficulty attending to directions  Memory: Decreased recall of recent events;Decreased short term memory;Decreased long term memory  Safety Judgement: Decreased awareness of need for assistance;Decreased awareness of need for safety  Problem Solving: Assistance required to generate solutions;Assistance required to implement solutions;Decreased awareness of errors  Insights: Decreased awareness of deficits  Initiation: Requires cues for some  Sequencing: Requires cues for some    Objective  AROM RLE (degrees)  RLE AROM: WFL  AROM LLE (degrees)  LLE AROM : WFL  Strength RLE  Strength RLE: WFL  Comment: 4+/5 ankle DF, knee flex/ext  Strength LLE  Strength LLE: WFL  Comment: 4+/5 ankle DF, knee flex/ext  Motor Control  Gross Motor?: WFL (Pt with difficulty with coordination, not formally assessed due to poor command following.)        Bed mobility  Rolling to Right: Minimal assistance  Comment: bed flat     Transfers  Sit to Stand: Minimal Assistance; Moderate Assistance (Min A x3 trials;  Mod A x1 trial)  Stand to sit: Minimal Assistance (For all trials, pt with no eccentric control despite good hand placement)  Bed to Chair: Moderate assistance;Maximum assistance (Mod-Max A for stand pivot)     Ambulation  Ambulation?: Yes  More Ambulation?: Yes  Ambulation 1  Surface: risk precautions in place, Left in chair, Gait belt, Call light within reach, Patient at risk for falls, Nurse notified, Chair alarm in place    AM-PAC Score  AM-PAC Inpatient Mobility Raw Score : 14 (10/01/21 1054)  AM-PAC Inpatient T-Scale Score : 38.1 (10/01/21 1054)  Mobility Inpatient CMS 0-100% Score: 61.29 (10/01/21 1054)  Mobility Inpatient CMS G-Code Modifier : CL (10/01/21 1054)          Goals  Short term goals  Time Frame for Short term goals: Before discharge  Short term goal 1: Pt will complete bed mobility indep  Short term goal 2: Pt will complete sit<>stand with SBA  Short term goal 3: Pt will ambulate 100 ft with LRAD and SBA  Short term goal 4: PT will ascend/decsend 1 step without rail, with LRAD, and CGA  Patient Goals   Patient goals : Go home with help from family       Therapy Time   Individual Concurrent Group Co-treatment   Time In 0946         Time Out 1041         Minutes 55         Timed Code Treatment Minutes: [address] [place], [place], DPT #[id number]

## 2021-10-02 LAB
A/G RATIO: 1.6 (ref 1.1–2.2)
ALBUMIN SERPL-MCNC: 3 G/DL (ref 3.4–5)
ALP BLD-CCNC: 38 U/L (ref 40–129)
ALT SERPL-CCNC: 22 U/L (ref 10–40)
ANION GAP SERPL CALCULATED.3IONS-SCNC: 7 MMOL/L (ref 3–16)
AST SERPL-CCNC: 55 U/L (ref 15–37)
BILIRUB SERPL-MCNC: 0.4 MG/DL (ref 0–1)
BUN BLDV-MCNC: 9 MG/DL (ref 7–20)
CALCIUM SERPL-MCNC: 7.3 MG/DL (ref 8.3–10.6)
CHLORIDE BLD-SCNC: 113 MMOL/L (ref 99–110)
CO2: 23 MMOL/L (ref 21–32)
CREAT SERPL-MCNC: 0.6 MG/DL (ref 0.6–1.2)
GFR AFRICAN AMERICAN: >60
GFR NON-AFRICAN AMERICAN: >60
GLOBULIN: 1.9 G/DL
GLUCOSE BLD-MCNC: 89 MG/DL (ref 70–99)
HCT VFR BLD CALC: 23.1 % (ref 36–48)
HCT VFR BLD CALC: 25.8 % (ref 36–48)
HCT VFR BLD CALC: 27.2 % (ref 36–48)
HEMOGLOBIN: 7.8 G/DL (ref 12–16)
HEMOGLOBIN: 8.5 G/DL (ref 12–16)
HEMOGLOBIN: 9 G/DL (ref 12–16)
MAGNESIUM: 2.2 MG/DL (ref 1.8–2.4)
PHOSPHORUS: 1.8 MG/DL (ref 2.5–4.9)
POTASSIUM SERPL-SCNC: 3.9 MMOL/L (ref 3.5–5.1)
REASON FOR REJECTION: NORMAL
REJECTED TEST: NORMAL
SODIUM BLD-SCNC: 143 MMOL/L (ref 136–145)
TOTAL PROTEIN: 4.9 G/DL (ref 6.4–8.2)

## 2021-10-02 PROCEDURE — 6370000000 HC RX 637 (ALT 250 FOR IP): Performed by: INTERNAL MEDICINE

## 2021-10-02 PROCEDURE — C9113 INJ PANTOPRAZOLE SODIUM, VIA: HCPCS | Performed by: INTERNAL MEDICINE

## 2021-10-02 PROCEDURE — 2000000000 HC ICU R&B

## 2021-10-02 PROCEDURE — 85014 HEMATOCRIT: CPT

## 2021-10-02 PROCEDURE — 2580000003 HC RX 258: Performed by: INTERNAL MEDICINE

## 2021-10-02 PROCEDURE — 83735 ASSAY OF MAGNESIUM: CPT

## 2021-10-02 PROCEDURE — 6360000002 HC RX W HCPCS: Performed by: INTERNAL MEDICINE

## 2021-10-02 PROCEDURE — 2580000003 HC RX 258: Performed by: HOSPITALIST

## 2021-10-02 PROCEDURE — 84100 ASSAY OF PHOSPHORUS: CPT

## 2021-10-02 PROCEDURE — 85018 HEMOGLOBIN: CPT

## 2021-10-02 PROCEDURE — 80053 COMPREHEN METABOLIC PANEL: CPT

## 2021-10-02 PROCEDURE — 36415 COLL VENOUS BLD VENIPUNCTURE: CPT

## 2021-10-02 PROCEDURE — 2500000003 HC RX 250 WO HCPCS: Performed by: INTERNAL MEDICINE

## 2021-10-02 PROCEDURE — 94761 N-INVAS EAR/PLS OXIMETRY MLT: CPT

## 2021-10-02 RX ADMIN — Medication 10 ML: at 20:09

## 2021-10-02 RX ADMIN — SODIUM CHLORIDE 8 MG/HR: 9 INJECTION, SOLUTION INTRAVENOUS at 02:13

## 2021-10-02 RX ADMIN — SODIUM PHOSPHATE, MONOBASIC, MONOHYDRATE 8.85 MMOL: 276; 142 INJECTION, SOLUTION INTRAVENOUS at 05:01

## 2021-10-02 RX ADMIN — AMLODIPINE BESYLATE 5 MG: 5 TABLET ORAL at 11:29

## 2021-10-02 RX ADMIN — Medication 5 ML: at 09:33

## 2021-10-02 RX ADMIN — MELATONIN TAB 3 MG 10.5 MG: 3 TAB at 20:09

## 2021-10-02 RX ADMIN — LOSARTAN POTASSIUM 100 MG: 100 TABLET, FILM COATED ORAL at 09:32

## 2021-10-02 RX ADMIN — METOPROLOL SUCCINATE 50 MG: 50 TABLET, EXTENDED RELEASE ORAL at 09:32

## 2021-10-02 ASSESSMENT — PAIN SCALES - GENERAL
PAINLEVEL_OUTOF10: 0

## 2021-10-02 NOTE — PROGRESS NOTES
Hospitalist Progress Note      PCP: Dorinda Abraham MD    Date of Admission: 9/29/2021    Chief Complaint: rectal Bleeding    Hospital Course: Jose Ramon Grewal is a 80 y.o. female who presented with GI bleed. Patient was at adult  when she noted some clots. Patient said noted some bright red blood and clots in the stool. On the depends. Patient does take aspirin. Denies any significant symptoms. A couple episodes today. No dyspnea no chest pain no history of GI bleed previously recently. Subjective: Patient seen and examined. No further rectal bleeding. Denies any new complaints. No abdominal or rectal pain, fever or chills. Medications:  Reviewed    Infusion Medications    sodium chloride      sodium chloride      sodium chloride      pantoprozole (PROTONIX) infusion 8 mg/hr (10/02/21 0213)     Scheduled Medications    influenza virus vaccine  0.5 mL IntraMUSCular Prior to discharge    losartan  100 mg Oral Daily    amLODIPine  5 mg Oral Daily    metoprolol succinate  50 mg Oral Daily    sodium chloride flush  5-40 mL IntraVENous 2 times per day    pantoprazole  40 mg Oral QAM AC     PRN Meds: sodium chloride, ziprasidone, melatonin, sodium chloride, sodium chloride flush, sodium chloride, ondansetron **OR** ondansetron, polyethylene glycol, acetaminophen **OR** acetaminophen, potassium chloride, sodium phosphate IVPB **OR** sodium phosphate IVPB, magnesium sulfate      Intake/Output Summary (Last 24 hours) at 10/2/2021 1232  Last data filed at 10/2/2021 1149  Gross per 24 hour   Intake 954.28 ml   Output 1450 ml   Net -495.72 ml       Physical Exam Performed:    /87   Pulse 68   Temp 98.9 °F (37.2 °C) (Temporal)   Resp 16   Ht 5' 4\" (1.626 m)   Wt 113 lb 15.7 oz (51.7 kg)   SpO2 97%   BMI 19.56 kg/m²     General appearance: No apparent distress, appears stated age and cooperative. HEENT: Pupils equal, round, and reactive to light. Conjunctivae clear.   Neck: Supple, with full range of motion. No jugular venous distention. Trachea midline. Respiratory:  Normal respiratory effort. Clear to auscultation, bilaterally without Rales/Wheezes/Rhonchi. Cardiovascular: Regular rate and rhythm with normal S1/S2 without murmurs, rubs or gallops. Abdomen: Soft, non-tender, non-distended with normal bowel sounds. Musculoskeletal: No clubbing, cyanosis or edema bilaterally. Full range of motion without deformity. Skin: Skin color, texture, turgor normal.  No rashes or lesions. Neurologic:  Neurovascularly intact without any focal sensory/motor deficits. Cranial nerves: II-XII intact, grossly non-focal.  Psychiatric: Alert and oriented, thought content appropriate, normal insight  Capillary Refill: Brisk,3 seconds, normal   Peripheral Pulses: +2 palpable, equal bilaterally     Right femoral triple-lumen catheter. Labs:   Recent Labs     09/29/21  1355 09/29/21  2103 10/01/21  2150 10/02/21  0640 10/02/21  1048   WBC 7.2  --   --   --   --    HGB 12.1   < > 8.4* 7.8* 8.5*   HCT 37.0   < > 25.0* 23.1* 25.8*     --   --   --   --     < > = values in this interval not displayed. Recent Labs     09/30/21  0450 10/01/21  0550 10/02/21  0350    147* 143   K 4.1 3.6 3.9   * 119* 113*   CO2 22 21 23   BUN 18 14 9   CREATININE 0.6 0.6 0.6   CALCIUM 7.7* 7.2* 7.3*   PHOS 2.9 2.6 1.8*     Recent Labs     09/30/21  0450 10/01/21  0550 10/02/21  0350   AST 27 31 55*   ALT 17 16 22   BILITOT 0.4 0.3 0.4   ALKPHOS 45 37* 38*     Recent Labs     09/29/21  1355   INR 1.02     No results for input(s): CKTOTAL, TROPONINI in the last 72 hours. Urinalysis:      Lab Results   Component Value Date    NITRU Negative 09/29/2021    WBCUA 33 09/29/2021    RBCUA 1 09/29/2021    BLOODU TRACE 09/29/2021    SPECGRAV >1.030 09/29/2021    GLUCOSEU Negative 09/29/2021       Radiology:  CTA ABDOMEN PELVIS W WO CONTRAST   Final Result   No active GI bleed.   However, there is new lack of haustration with mild   diffuse mucosal enhancement throughout the distal transverse colon as well as   throughout the descending and sigmoid colon, findings which may be seen with   acute mesenteric ischemia. No pneumatosis, free fluid, bowel perforation, or   mesenteric or portal venous gas. Extensive colonic diverticulosis, without evidence of acute diverticulitis. XR CHEST PORTABLE   Final Result   NG tube courses into the stomach and acceptable. Hyperinflated lungs and old granulomatous disease are stable. XR CHEST PORTABLE   Final Result   1. Hyperinflated lungs with old granulomatous disease. 2.  NG tube tip is near the GE junction and should be advanced by 10-15 cm. CTA ABDOMEN PELVIS W WO CONTRAST   Final Result   Unremarkable CT angiogram of the abdomen and pelvis. Patent mesenteric   arteries and veins. No findings of acute or chronic mesenteric ischemia. No   active gastrointestinal bleed. Extensive colonic diverticulosis is again noted, without evidence of acute   diverticulitis. Cholelithiasis. CTA ABDOMEN PELVIS W WO CONTRAST   Final Result   Motion limited study. No CT evidence of acute intra-abdominal process. No evidence of active GI   bleeding. Colonic diverticulosis with large amount stool throughout the colon. Severe atherosclerosis. Assessment/Plan:    Active Hospital Problems    Diagnosis     Diverticulosis of colon with hemorrhage of large intestine [K57.31]     Acute blood loss anemia [D62]     Acute lower GI hemorrhage [K92.2]     Hypertension [I10]        Acute GI bleed due to Diverticular bleed with Acute blood loss Anemia:  CTA: Negative for active bleed x 3. GI consulted: s/p EGD (Normal) & colonoscopy (pancolonic diverticulosis with hemorrhage) 9/30/21. Continue Protonix PO daily. Full liquid diet. Typed RBC scan if bleeding recurs. s/p 3 units of PRBC's. Monitor hgb every 6 hours. Goal hemoglobin greater than 7 g/dL. Maintain active type and screen. GI recommending surgical consult or IR consult if re bleeds due to her apparent intolerance to colonoscopy. Hypotension: resolved. history of hypertension. Resumed metoprolol & Metoprolol. Mild hyponatremia with hypochloremia:  Improved with free water intake. Monitor Na and Cl level. DVT Prophylaxis: SCD  Diet: ADULT DIET; Full Liquid  Code Status: Full Code    PT/OT Eval Status: pending stabilization. Dispo - once medically stable.     Jacqualine Primrose, MD

## 2021-10-02 NOTE — PROGRESS NOTES
Gastroenterology Progress Note            Marrianne Dubin is a 80 y.o. female patient. 1. Hematochezia    2. Gastrointestinal hemorrhage associated with intestinal diverticulosis        SUBJECTIVE:  Denies bm/bleeding. No ab pain    Physical    VITALS:  BP (!) 138/50   Pulse 78   Temp 98.9 °F (37.2 °C) (Temporal)   Resp 17   Ht 5' 4\" (1.626 m)   Wt 113 lb 15.7 oz (51.7 kg)   SpO2 98%   BMI 19.56 kg/m²   TEMPERATURE:  Current - Temp: 98.9 °F (37.2 °C); Max - Temp  Av °F (37.2 °C)  Min: 98.6 °F (37 °C)  Max: 100 °F (37.8 °C)    Abdomen soft, ND, NT, no HSM, Bowel sounds normal   Awake and alert. nad      Data      Recent Labs     21  1355 21  2103 10/01/21  1540 10/01/21  2150 10/02/21  0640   WBC 7.2  --   --   --   --    HGB 12.1   < > 8.5* 8.4* 7.8*   HCT 37.0   < > 25.3* 25.0* 23.1*   MCV 83.3  --   --   --   --      --   --   --   --     < > = values in this interval not displayed. Recent Labs     21  0450 10/01/21  0550 10/02/21  0350    147* 143   K 4.1 3.6 3.9   * 119* 113*   CO2 22 21 23   PHOS 2.9 2.6 1.8*   BUN 18 14 9   CREATININE 0.6 0.6 0.6     Recent Labs     21  0450 10/01/21  0550 10/02/21  0350   AST 27 31 55*   ALT 17 16 22   BILITOT 0.4 0.3 0.4   ALKPHOS 45 37* 38*     No results for input(s): LIPASE, AMYLASE in the last 72 hours. ASSESSMENT   1. Hematochezia-  None since colonoscopy. Likely diverticular bleeding but unable to locate source due to colonoscopy interrupted for bradycardia during procedure. 2. Acute blood loss anemia- hgb 7.8 now. PLAN    1. Cont to follow hgb  2.  If rebleeding would get tagged cell scan and consider IR vs surgery due to her apparent intolerance to colonoscopy     Mitra Alcantara MD  [address]

## 2021-10-02 NOTE — PROGRESS NOTES
Reassessment complete. VSS. Butt catheter removed. Patient up to chair with walker. All patient needs met at this time.

## 2021-10-02 NOTE — PROGRESS NOTES
Shift assessment complete. VSS. Patient is alert to self. Patient stable through last night. No blood noted. Patient tolerating diet. Patients daughter in room. All patient needs met at this time.

## 2021-10-02 NOTE — PLAN OF CARE
Problem: Falls - Risk of:  Goal: Will remain free from falls  Description: Will remain free from falls  Outcome: Met This Shift  Goal: Absence of physical injury  Description: Absence of physical injury  Outcome: Met This Shift     Problem: Skin Integrity:  Goal: Will show no infection signs and symptoms  Description: Will show no infection signs and symptoms  Outcome: Met This Shift  Goal: Absence of new skin breakdown  Description: Absence of new skin breakdown  Outcome: Met This Shift     Problem: Mental Status - Impaired:  Goal: Absence of physical injury  Description: Absence of physical injury  Outcome: Met This Shift  Goal: Absence of continued neurological deterioration signs and symptoms  Description: Absence of continued neurological deterioration signs and symptoms  Outcome: Met This Shift  Goal: Mental status will be restored to baseline  Description: Mental status will be restored to baseline  Outcome: Met This Shift     Problem: Pain:  Goal: Pain level will decrease  Description: Pain level will decrease  Outcome: Met This Shift  Goal: Ability to notify healthcare provider of pain before it becomes unmanageable or unbearable will improve  Description: Ability to notify healthcare provider of pain before it becomes unmanageable or unbearable will improve  Outcome: Met This Shift     Problem: Discharge Planning:  Goal: Participates in care planning  Description: Participates in care planning  Outcome: Ongoing  Goal: Discharged to appropriate level of care  Description: Discharged to appropriate level of care  Outcome: Ongoing     Problem: Activity Intolerance:  Goal: Ability to tolerate increased activity will improve  Description: Ability to tolerate increased activity will improve  Outcome: Ongoing     Problem: Anxiety/Stress:  Goal: Level of anxiety will decrease  Description: Level of anxiety will decrease  Outcome: Ongoing     Problem:  Bowel Function - Altered:  Goal: Bowel elimination is within specified parameters  Description: Bowel elimination is within specified parameters  Outcome: Ongoing     Problem: Fluid Volume - Deficit:  Goal: Absence of fluid volume deficit signs and symptoms  Description: Absence of fluid volume deficit signs and symptoms  Outcome: Ongoing  Goal: Electrolytes within specified parameters  Description: Electrolytes within specified parameters  Outcome: Ongoing     Problem: Mobility - Impaired:  Goal: Mobility will improve to maximum level  Description: Mobility will improve to maximum level  Outcome: Ongoing     Problem: Nutrition Deficit:  Goal: Ability to achieve adequate nutritional intake will improve  Description: Ability to achieve adequate nutritional intake will improve  Outcome: Ongoing     Problem: Nausea/Vomiting:  Goal: Ability to achieve adequate nutritional intake will improve  Description: Ability to achieve adequate nutritional intake will improve  Outcome: Ongoing

## 2021-10-03 LAB
A/G RATIO: 1.4 (ref 1.1–2.2)
ABO/RH: NORMAL
ALBUMIN SERPL-MCNC: 2.7 G/DL (ref 3.4–5)
ALP BLD-CCNC: 43 U/L (ref 40–129)
ALT SERPL-CCNC: 19 U/L (ref 10–40)
ANION GAP SERPL CALCULATED.3IONS-SCNC: 6 MMOL/L (ref 3–16)
ANTIBODY SCREEN: NORMAL
AST SERPL-CCNC: 40 U/L (ref 15–37)
BILIRUB SERPL-MCNC: <0.2 MG/DL (ref 0–1)
BLOOD BANK DISPENSE STATUS: NORMAL
BLOOD BANK PRODUCT CODE: NORMAL
BPU ID: NORMAL
BUN BLDV-MCNC: 9 MG/DL (ref 7–20)
CALCIUM SERPL-MCNC: 7.4 MG/DL (ref 8.3–10.6)
CHLORIDE BLD-SCNC: 111 MMOL/L (ref 99–110)
CO2: 25 MMOL/L (ref 21–32)
CREAT SERPL-MCNC: 0.7 MG/DL (ref 0.6–1.2)
DESCRIPTION BLOOD BANK: NORMAL
GFR AFRICAN AMERICAN: >60
GFR NON-AFRICAN AMERICAN: >60
GLOBULIN: 2 G/DL
GLUCOSE BLD-MCNC: 104 MG/DL (ref 70–99)
HCT VFR BLD CALC: 20.4 % (ref 36–48)
HCT VFR BLD CALC: 20.8 % (ref 36–48)
HCT VFR BLD CALC: 21.6 % (ref 36–48)
HCT VFR BLD CALC: 25.4 % (ref 36–48)
HEMOGLOBIN: 6.9 G/DL (ref 12–16)
HEMOGLOBIN: 7 G/DL (ref 12–16)
HEMOGLOBIN: 7.2 G/DL (ref 12–16)
HEMOGLOBIN: 8.6 G/DL (ref 12–16)
MAGNESIUM: 2.1 MG/DL (ref 1.8–2.4)
PHOSPHORUS: 2.1 MG/DL (ref 2.5–4.9)
POTASSIUM SERPL-SCNC: 3.2 MMOL/L (ref 3.5–5.1)
SODIUM BLD-SCNC: 142 MMOL/L (ref 136–145)
TOTAL PROTEIN: 4.7 G/DL (ref 6.4–8.2)

## 2021-10-03 PROCEDURE — 86923 COMPATIBILITY TEST ELECTRIC: CPT

## 2021-10-03 PROCEDURE — 2580000003 HC RX 258: Performed by: INTERNAL MEDICINE

## 2021-10-03 PROCEDURE — 2000000000 HC ICU R&B

## 2021-10-03 PROCEDURE — 85014 HEMATOCRIT: CPT

## 2021-10-03 PROCEDURE — 6370000000 HC RX 637 (ALT 250 FOR IP): Performed by: INTERNAL MEDICINE

## 2021-10-03 PROCEDURE — P9016 RBC LEUKOCYTES REDUCED: HCPCS

## 2021-10-03 PROCEDURE — 83735 ASSAY OF MAGNESIUM: CPT

## 2021-10-03 PROCEDURE — 85018 HEMOGLOBIN: CPT

## 2021-10-03 PROCEDURE — 86900 BLOOD TYPING SEROLOGIC ABO: CPT

## 2021-10-03 PROCEDURE — 84100 ASSAY OF PHOSPHORUS: CPT

## 2021-10-03 PROCEDURE — 2500000003 HC RX 250 WO HCPCS: Performed by: INTERNAL MEDICINE

## 2021-10-03 PROCEDURE — 2580000003 HC RX 258: Performed by: HOSPITALIST

## 2021-10-03 PROCEDURE — 6360000002 HC RX W HCPCS: Performed by: INTERNAL MEDICINE

## 2021-10-03 PROCEDURE — 80053 COMPREHEN METABOLIC PANEL: CPT

## 2021-10-03 PROCEDURE — 86850 RBC ANTIBODY SCREEN: CPT

## 2021-10-03 PROCEDURE — 36415 COLL VENOUS BLD VENIPUNCTURE: CPT

## 2021-10-03 PROCEDURE — 86901 BLOOD TYPING SEROLOGIC RH(D): CPT

## 2021-10-03 RX ORDER — LOSARTAN POTASSIUM 25 MG/1
50 TABLET ORAL DAILY
Status: DISCONTINUED | OUTPATIENT
Start: 2021-10-04 | End: 2021-10-07 | Stop reason: HOSPADM

## 2021-10-03 RX ORDER — SODIUM CHLORIDE 9 MG/ML
INJECTION, SOLUTION INTRAVENOUS PRN
Status: DISCONTINUED | OUTPATIENT
Start: 2021-10-03 | End: 2021-10-05 | Stop reason: SDUPTHER

## 2021-10-03 RX ADMIN — METOPROLOL SUCCINATE 50 MG: 50 TABLET, EXTENDED RELEASE ORAL at 09:10

## 2021-10-03 RX ADMIN — POTASSIUM & SODIUM PHOSPHATES POWDER PACK 280-160-250 MG 250 MG: 280-160-250 PACK at 17:41

## 2021-10-03 RX ADMIN — Medication 10 ML: at 22:31

## 2021-10-03 RX ADMIN — PANTOPRAZOLE SODIUM 40 MG: 40 TABLET, DELAYED RELEASE ORAL at 05:56

## 2021-10-03 RX ADMIN — POTASSIUM CHLORIDE 20 MEQ: 29.8 INJECTION, SOLUTION INTRAVENOUS at 04:27

## 2021-10-03 RX ADMIN — POTASSIUM & SODIUM PHOSPHATES POWDER PACK 280-160-250 MG 250 MG: 280-160-250 PACK at 22:30

## 2021-10-03 RX ADMIN — POTASSIUM CHLORIDE 20 MEQ: 29.8 INJECTION, SOLUTION INTRAVENOUS at 05:54

## 2021-10-03 RX ADMIN — Medication 10 ML: at 08:54

## 2021-10-03 RX ADMIN — POTASSIUM BICARBONATE 40 MEQ: 782 TABLET, EFFERVESCENT ORAL at 09:10

## 2021-10-03 RX ADMIN — SODIUM PHOSPHATE, MONOBASIC, MONOHYDRATE 8.85 MMOL: 276; 142 INJECTION, SOLUTION INTRAVENOUS at 04:33

## 2021-10-03 RX ADMIN — POTASSIUM BICARBONATE 40 MEQ: 782 TABLET, EFFERVESCENT ORAL at 22:30

## 2021-10-03 ASSESSMENT — PAIN SCALES - GENERAL: PAINLEVEL_OUTOF10: 0

## 2021-10-03 NOTE — PROGRESS NOTES
Gastroenterology Progress Note            Marrianne Dubin is a 80 y.o. female patient. 1. Hematochezia    2. Gastrointestinal hemorrhage associated with intestinal diverticulosis        SUBJECTIVE:  Had a small dark stool with red streak. No other gross hematochezia. Physical    VITALS:  BP (!) 120/52   Pulse 67   Temp 98.3 °F (36.8 °C) (Temporal)   Resp 15   Ht 5' 4\" (1.626 m)   Wt 124 lb 5.4 oz (56.4 kg)   SpO2 100%   BMI 21.34 kg/m²   TEMPERATURE:  Current - Temp: 98.3 °F (36.8 °C); Max - Temp  Av.7 °F (37.1 °C)  Min: 98.2 °F (36.8 °C)  Max: 99.3 °F (37.4 °C)    Abdomen soft, ND, NT, no HSM, Bowel sounds normal   nad  aaox 3  Eomi. Data      Recent Labs     10/02/21  1048 10/02/21  1905 10/03/21  0230   HGB 8.5* 9.0* 7.2*   HCT 25.8* 27.2* 21.6*     Recent Labs     10/01/21  0550 10/02/21  0350 10/03/21  0230   * 143 142   K 3.6 3.9 3.2*   * 113* 111*   CO2 21 23 25   PHOS 2.6 1.8* 2.1*   BUN 14 9 9   CREATININE 0.6 0.6 0.7     Recent Labs     10/01/21  0550 10/02/21  0350 10/03/21  0230   AST 31 55* 40*   ALT 16 22 19   BILITOT 0.3 0.4 <0.2   ALKPHOS 37* 38* 43     No results for input(s): LIPASE, AMYLASE in the last 72 hours. ASSESSMENT   1. Hematochezia-  None since colonoscopy other than a small streak. Likely diverticular bleeding but unable to locate source due to colonoscopy interrupted for bradycardia during procedure. 2. Acute blood loss anemia- hgb 7.2 now but hgb with widely varying results without transfusion  From 7.8 to 9 to 7.2.              PLAN    1. Cont to follow hgb  2. If rebleeding would get tagged cell scan and consider IR vs surgery due to her apparent intolerance to colonoscopy   3. Advance diet  4.  If does ok today maybe home tomorrow.        Mitra Alcantara MD  [address]

## 2021-10-03 NOTE — PROGRESS NOTES
cooperative. HEENT: Pupils equal, round, and reactive to light. Conjunctivae clear. Neck: Supple, with full range of motion. No jugular venous distention. Trachea midline. Respiratory:  Normal respiratory effort. Clear to auscultation, bilaterally without Rales/Wheezes/Rhonchi. Cardiovascular: Regular rate and rhythm with normal S1/S2 without murmurs, rubs or gallops. Abdomen: Soft, non-tender, non-distended with normal bowel sounds. Musculoskeletal: No clubbing, cyanosis or edema bilaterally. Full range of motion without deformity. Skin: Skin color, texture, turgor normal.  No rashes or lesions. Neurologic:  Neurovascularly intact without any focal sensory/motor deficits. Cranial nerves: II-XII intact, grossly non-focal.  Psychiatric: Alert and oriented, thought content appropriate, normal insight  Capillary Refill: Brisk,3 seconds, normal   Peripheral Pulses: +2 palpable, equal bilaterally     Right femoral triple-lumen catheter. Rectal exam 10/3: Blood on examining finger. Labs:   Recent Labs     10/03/21  0230 10/03/21  0829 10/03/21  1430   HGB 7.2* 6.9* 8.6*   HCT 21.6* 20.4* 25.4*     Recent Labs     10/01/21  0550 10/02/21  0350 10/03/21  0230   * 143 142   K 3.6 3.9 3.2*   * 113* 111*   CO2 21 23 25   BUN 14 9 9   CREATININE 0.6 0.6 0.7   CALCIUM 7.2* 7.3* 7.4*   PHOS 2.6 1.8* 2.1*     Recent Labs     10/01/21  0550 10/02/21  0350 10/03/21  0230   AST 31 55* 40*   ALT 16 22 19   BILITOT 0.3 0.4 <0.2   ALKPHOS 37* 38* 43     No results for input(s): INR in the last 72 hours. No results for input(s): Rde Shorts in the last 72 hours. Urinalysis:      Lab Results   Component Value Date    NITRU Negative 09/29/2021    WBCUA 33 09/29/2021    RBCUA 1 09/29/2021    BLOODU TRACE 09/29/2021    SPECGRAV >1.030 09/29/2021    GLUCOSEU Negative 09/29/2021       Radiology:  CTA ABDOMEN PELVIS W WO CONTRAST   Final Result   No active GI bleed.   However, there is new lack of haustration with mild   diffuse mucosal enhancement throughout the distal transverse colon as well as   throughout the descending and sigmoid colon, findings which may be seen with   acute mesenteric ischemia. No pneumatosis, free fluid, bowel perforation, or   mesenteric or portal venous gas. Extensive colonic diverticulosis, without evidence of acute diverticulitis. XR CHEST PORTABLE   Final Result   NG tube courses into the stomach and acceptable. Hyperinflated lungs and old granulomatous disease are stable. XR CHEST PORTABLE   Final Result   1. Hyperinflated lungs with old granulomatous disease. 2.  NG tube tip is near the GE junction and should be advanced by 10-15 cm. CTA ABDOMEN PELVIS W WO CONTRAST   Final Result   Unremarkable CT angiogram of the abdomen and pelvis. Patent mesenteric   arteries and veins. No findings of acute or chronic mesenteric ischemia. No   active gastrointestinal bleed. Extensive colonic diverticulosis is again noted, without evidence of acute   diverticulitis. Cholelithiasis. CTA ABDOMEN PELVIS W WO CONTRAST   Final Result   Motion limited study. No CT evidence of acute intra-abdominal process. No evidence of active GI   bleeding. Colonic diverticulosis with large amount stool throughout the colon. Severe atherosclerosis. Assessment/Plan:    Active Hospital Problems    Diagnosis     Diverticulosis of colon with hemorrhage of large intestine [K57.31]     Acute blood loss anemia [D62]     Acute lower GI hemorrhage [K92.2]     Hypertension [I10]        Acute GI bleed due to Diverticular bleed with Acute blood loss Anemia:  CTA: Negative for active bleed x 3. GI consulted: s/p EGD (Normal) & colonoscopy (pancolonic diverticulosis with hemorrhage) 9/30/21. Continue Protonix PO daily. Full liquid diet. Typed RBC scan if bleeding recurs. s/p 4 units of PRBC's. Last transfusion 10/3.   Monitor hgb every 6 hours.  Goal hemoglobin greater than 7 g/dL. Maintain active type and screen. GI recommending surgical consult or IR consult if re bleeds due to her apparent intolerance to colonoscopy. 10/3: Patient with small bloody stool yesterday. Rectal exam this am showed rectal bleeding. Hgb 6.8 g/dl. Tagged RBC scan ordered but unable to have it done being a weekend. No further bleeding noted and 1 unit PRBC given. Hypotension: resolved. history of hypertension. Resumed metoprolol, Losartan & Amlodipine. Monitor BOP closely. Mild hyponatremia with hypochloremia:  Improved with free water intake. Monitor Na and Cl level. IV access: Patient with Right femoral triple-lumen catheter. Please discontinue if no longer needed. DVT Prophylaxis: SCD  Diet: Diet NPO Exceptions are: Sips of Water with Meds, Ice Chips  ADULT DIET; Clear Liquid  Code Status: Full Code    PT/OT Eval Status: pending stabilization. Dispo - once medically stable.     Vaishali Hastings MD

## 2021-10-04 ENCOUNTER — APPOINTMENT (OUTPATIENT)
Dept: NUCLEAR MEDICINE | Age: 86
DRG: 378 | End: 2021-10-04
Payer: MEDICARE

## 2021-10-04 LAB
A/G RATIO: 1.3 (ref 1.1–2.2)
ALBUMIN SERPL-MCNC: 2.5 G/DL (ref 3.4–5)
ALP BLD-CCNC: 41 U/L (ref 40–129)
ALT SERPL-CCNC: 16 U/L (ref 10–40)
ANION GAP SERPL CALCULATED.3IONS-SCNC: 5 MMOL/L (ref 3–16)
AST SERPL-CCNC: 31 U/L (ref 15–37)
BILIRUB SERPL-MCNC: 0.3 MG/DL (ref 0–1)
BLOOD BANK DISPENSE STATUS: NORMAL
BLOOD BANK PRODUCT CODE: NORMAL
BPU ID: NORMAL
BUN BLDV-MCNC: 12 MG/DL (ref 7–20)
CALCIUM SERPL-MCNC: 7.1 MG/DL (ref 8.3–10.6)
CHLORIDE BLD-SCNC: 111 MMOL/L (ref 99–110)
CO2: 25 MMOL/L (ref 21–32)
CREAT SERPL-MCNC: 0.6 MG/DL (ref 0.6–1.2)
DESCRIPTION BLOOD BANK: NORMAL
GFR AFRICAN AMERICAN: >60
GFR NON-AFRICAN AMERICAN: >60
GLOBULIN: 2 G/DL
GLUCOSE BLD-MCNC: 99 MG/DL (ref 70–99)
HCT VFR BLD CALC: 19.5 % (ref 36–48)
HCT VFR BLD CALC: 23.5 % (ref 36–48)
HCT VFR BLD CALC: 25.3 % (ref 36–48)
HEMOGLOBIN: 6.6 G/DL (ref 12–16)
HEMOGLOBIN: 7.8 G/DL (ref 12–16)
HEMOGLOBIN: 8.5 G/DL (ref 12–16)
MAGNESIUM: 2.2 MG/DL (ref 1.8–2.4)
PHOSPHORUS: 2.7 MG/DL (ref 2.5–4.9)
POTASSIUM SERPL-SCNC: 4 MMOL/L (ref 3.5–5.1)
SODIUM BLD-SCNC: 141 MMOL/L (ref 136–145)
TOTAL PROTEIN: 4.5 G/DL (ref 6.4–8.2)

## 2021-10-04 PROCEDURE — 6370000000 HC RX 637 (ALT 250 FOR IP): Performed by: INTERNAL MEDICINE

## 2021-10-04 PROCEDURE — 84100 ASSAY OF PHOSPHORUS: CPT

## 2021-10-04 PROCEDURE — 80053 COMPREHEN METABOLIC PANEL: CPT

## 2021-10-04 PROCEDURE — 85014 HEMATOCRIT: CPT

## 2021-10-04 PROCEDURE — A9560 TC99M LABELED RBC: HCPCS | Performed by: PHYSICIAN ASSISTANT

## 2021-10-04 PROCEDURE — 2000000000 HC ICU R&B

## 2021-10-04 PROCEDURE — 97530 THERAPEUTIC ACTIVITIES: CPT

## 2021-10-04 PROCEDURE — 83735 ASSAY OF MAGNESIUM: CPT

## 2021-10-04 PROCEDURE — 36592 COLLECT BLOOD FROM PICC: CPT

## 2021-10-04 PROCEDURE — 85018 HEMOGLOBIN: CPT

## 2021-10-04 PROCEDURE — 3430000000 HC RX DIAGNOSTIC RADIOPHARMACEUTICAL: Performed by: PHYSICIAN ASSISTANT

## 2021-10-04 PROCEDURE — 2580000003 HC RX 258: Performed by: HOSPITALIST

## 2021-10-04 PROCEDURE — 78278 ACUTE GI BLOOD LOSS IMAGING: CPT

## 2021-10-04 PROCEDURE — 2580000003 HC RX 258: Performed by: PHYSICIAN ASSISTANT

## 2021-10-04 RX ORDER — SODIUM CHLORIDE 9 MG/ML
INJECTION, SOLUTION INTRAVENOUS PRN
Status: DISCONTINUED | OUTPATIENT
Start: 2021-10-04 | End: 2021-10-07 | Stop reason: HOSPADM

## 2021-10-04 RX ORDER — SODIUM CHLORIDE 9 MG/ML
INJECTION, SOLUTION INTRAVENOUS
Status: DISPENSED
Start: 2021-10-04 | End: 2021-10-04

## 2021-10-04 RX ORDER — SODIUM CHLORIDE 0.9 % (FLUSH) 0.9 %
5-40 SYRINGE (ML) INJECTION 2 TIMES DAILY
Status: DISCONTINUED | OUTPATIENT
Start: 2021-10-04 | End: 2021-10-07 | Stop reason: HOSPADM

## 2021-10-04 RX ADMIN — POTASSIUM & SODIUM PHOSPHATES POWDER PACK 280-160-250 MG 250 MG: 280-160-250 PACK at 20:41

## 2021-10-04 RX ADMIN — Medication 10 ML: at 10:41

## 2021-10-04 RX ADMIN — Medication 10 ML: at 08:35

## 2021-10-04 RX ADMIN — POTASSIUM & SODIUM PHOSPHATES POWDER PACK 280-160-250 MG 250 MG: 280-160-250 PACK at 08:34

## 2021-10-04 RX ADMIN — POTASSIUM & SODIUM PHOSPHATES POWDER PACK 280-160-250 MG 250 MG: 280-160-250 PACK at 17:50

## 2021-10-04 RX ADMIN — PANTOPRAZOLE SODIUM 40 MG: 40 TABLET, DELAYED RELEASE ORAL at 05:12

## 2021-10-04 RX ADMIN — METOPROLOL SUCCINATE 50 MG: 50 TABLET, EXTENDED RELEASE ORAL at 08:34

## 2021-10-04 RX ADMIN — Medication 10 ML: at 20:41

## 2021-10-04 RX ADMIN — POTASSIUM & SODIUM PHOSPHATES POWDER PACK 280-160-250 MG 250 MG: 280-160-250 PACK at 13:13

## 2021-10-04 RX ADMIN — Medication 26.54 MILLICURIE: at 10:40

## 2021-10-04 RX ADMIN — LOSARTAN POTASSIUM 50 MG: 25 TABLET, FILM COATED ORAL at 08:34

## 2021-10-04 RX ADMIN — AMLODIPINE BESYLATE 5 MG: 5 TABLET ORAL at 08:34

## 2021-10-04 RX ADMIN — Medication 10 ML: at 22:13

## 2021-10-04 ASSESSMENT — PAIN SCALES - GENERAL
PAINLEVEL_OUTOF10: 0

## 2021-10-04 NOTE — PROGRESS NOTES
ADL status  Assessment: Pt is an 79 yo female admitted to [facility] for rectal bleeding, pt has received multiple units of blood. Pt continues to require moderate assistance for transfers due to posterior lean and assistance for walker navigation when ambulating. Pt engaged with PT minimally verbally and barely opened her eyes throughout session. Continued skilled PT to promote return towards PLOF. Treatment Diagnosis: Impaired functional mobility due to poor motor planning and decreased balance  Prognosis: Good  PT Education: Goals;PT Role;Plan of Care;Transfer Training;General Safety  Patient Education: Pt unable to verbalize understanding due to cognition. Family not present. Barriers to Learning: Cognition (dementia)  REQUIRES PT FOLLOW UP: Yes  Activity Tolerance  Activity Tolerance: Patient Tolerated treatment well;Patient limited by cognitive status     Patient Diagnosis(es): The primary encounter diagnosis was Hematochezia. A diagnosis of Gastrointestinal hemorrhage associated with intestinal diverticulosis was also pertinent to this visit. has a past medical history of Chronic back pain, CKD (chronic kidney disease) stage 3, GFR 30-59 ml/min ([facility]), Dementia ([facility] [address] and Hypertension. has a past surgical history that includes Toe Surgery; Hysterectomy, total abdominal; hernia repair (Left, 4/2/2020); Upper gastrointestinal endoscopy (N/A, 9/30/2021); and Colonoscopy (N/A, 9/30/2021). Restrictions  Restrictions/Precautions  Restrictions/Precautions: Fall Risk (high fall risk)  Position Activity Restriction  Other position/activity restrictions: 80 y.o. female who presented with GI bleed. Patient was at adult  when she noted some clots. Patient said noted some bright red blood and clots in the stool. On the depends. Patient does take aspirin. Denies any significant symptoms. A couple episodes today. No dyspnea no chest pain no history of GI bleed previously recently.  9/30: pancolonic diverticulosis with blood throughout colon. EGD normal. Difficult colon with 2 episodes of severe bradycardia requiring w/d of scope. CTA x 3 neg for active bleeding  Subjective   General  Chart Reviewed: Yes  Family / Caregiver Present: No  Subjective  Subjective: no pain reported, agreeable to therapy  General Comment  Comments: in bed at arrival, asleep, responds to verbal stimuli but didn't open eyes much     Objective   Bed mobility  Supine to Sit: Moderate assistance  Scooting: Minimal assistance  Comment: HOB slightly elevated  Transfers  Sit to Stand: Moderate Assistance  Stand to sit: Minimal Assistance  Comment: Pt holding legs in extension and required verbal and tactile cues for proper placement prior to standing. Pt requires increased time and pushes posteriorly during stance. Simple commands provided. Transfers performed from EOB, couch, and recliner. x 3 total.  Ambulation  Ambulation?: Yes  Ambulation 1  Surface: level tile  Device: Rolling Walker  Assistance: Minimal assistance  Quality of Gait: flexed posture  Gait Deviations: Slow Radha;Decreased step length;Decreased step height  Distance: 20 ft x 3  Comments: Requires assistance for walker navigation especially on turns. Min A for balance. Stairs/Curb  Stairs?: No     Balance  Posture: Fair  Sitting - Static: Good  Sitting - Dynamic: Good  Standing - Static: Poor  Standing - Dynamic: Poor  Comments: Initially while seated EOB pt extending legs and sliding hips forward. Required blocking of LEs after assistance of placing in flexed position.                       AM-PAC Score  AM-PAC Inpatient Mobility Raw Score : 13 (10/04/21 1434)  AM-PAC Inpatient T-Scale Score : 36.74 (10/04/21 1434)  Mobility Inpatient CMS 0-100% Score: 64.91 (10/04/21 1434)  Mobility Inpatient CMS G-Code Modifier : CL (10/04/21 1434)          Goals-- not met   Short term goals  Time Frame for Short term goals: Before discharge  Short term goal 1: Pt will complete bed mobility indep  Short term goal 2: Pt will complete sit<>stand with SBA  Short term goal 3: Pt will ambulate 100 ft with LRAD and SBA  Short term goal 4: PT will ascend/decsend 1 step without rail, with LRAD, and CGA  Patient Goals   Patient goals : Go home with help from family    Plan    Plan  Times per week: 3-5x  Current Treatment Recommendations: Strengthening, Functional Mobility Training, IADL Training, Neuromuscular Re-education, Home Exercise Program, Safety Education & Training, Patient/Caregiver Education & Training, Equipment Evaluation, Education, & procurement, Transfer Training, Gait Training, Stair training, Balance Training, Endurance Training  Safety Devices  Type of devices:  All fall risk precautions in place, Left in chair, Gait belt, Call light within reach, Patient at risk for falls, Nurse notified, Chair alarm in place     Therapy Time   Individual Concurrent Group Co-treatment   Time In 1410         Time Out 1433         Minutes 23         Timed Code Treatment Minutes: Nick 57, [id number]

## 2021-10-04 NOTE — PROGRESS NOTES
[facility] PROGRESS NOTE    10/4/2021 11:03 AM        Name: Vilma Tillman . Admitted: 9/29/2021  Primary Care Provider: Kemi Bardales MD (Tel: [phone])                        Subjective:  . No acute events overnight. Resting well. Pain control. Diet ok. Labs reviewed  Denies any chest pain sob. Hemoglobin still trended down. Getting transfusion.   Reviewed interval ancillary notes    Current Medications  0.9 % sodium chloride infusion, PRN  sodium chloride 0.9 % infusion,   sodium chloride flush 0.9 % injection 5-40 mL, BID  potassium & sodium phosphates (PHOS-NAK) 280-160-250 MG packet 250 mg, 4x Daily  0.9 % sodium chloride infusion, PRN  losartan (COZAAR) tablet 50 mg, Daily  influenza quadrivalent split vaccine (FLUZONE;FLUARIX;FLULAVAL;AFLURIA) injection 0.5 mL, Prior to discharge  amLODIPine (NORVASC) tablet 5 mg, Daily  0.9 % sodium chloride infusion, PRN  ziprasidone (GEODON) injection 10 mg, Q6H PRN  melatonin tablet 10.5 mg, Nightly PRN  0.9 % sodium chloride infusion, PRN  metoprolol succinate (TOPROL XL) extended release tablet 50 mg, Daily  sodium chloride flush 0.9 % injection 5-40 mL, 2 times per day  sodium chloride flush 0.9 % injection 5-40 mL, PRN  0.9 % sodium chloride infusion, PRN  ondansetron (ZOFRAN-ODT) disintegrating tablet 4 mg, Q8H PRN   Or  ondansetron (ZOFRAN) injection 4 mg, Q6H PRN  polyethylene glycol (GLYCOLAX) packet 17 g, Daily PRN  acetaminophen (TYLENOL) tablet 650 mg, Q6H PRN   Or  acetaminophen (TYLENOL) suppository 650 mg, Q6H PRN  pantoprazole (PROTONIX) tablet 40 mg, QAM AC  potassium chloride 20 mEq/50 mL IVPB (Central Line), PRN  sodium phosphate 8.85 mmol in dextrose 5 % 250 mL IVPB, PRN   Or  sodium phosphate 17.7 mmol in dextrose 5 % 250 mL IVPB, PRN  magnesium sulfate 1000 mg in dextrose 5% 100 mL IVPB, PRN        Objective:  BP (!) 128/50   Pulse 63   Temp 98.4 °F (36.9 °C) (Temporal)   Resp 26   Ht 5' 4\" (1.626 m)   Wt 120 lb 9.5 oz (54.7 kg)   SpO2 100%   BMI 20.70 kg/m²     Intake/Output Summary (Last 24 hours) at 10/4/2021 1103  Last data filed at 10/3/2021 1328  Gross per 24 hour   Intake 302.08 ml   Output    Net 302.08 ml      Wt Readings from Last 3 Encounters:   10/04/21 120 lb 9.5 oz (54.7 kg)   04/12/21 111 lb (50.3 kg)   02/23/21 111 lb (50.3 kg)       General appearance:  Appears comfortable  Eyes: Sclera clear. Pupils equal.  ENT: Moist oral mucosa. Trachea midline, no adenopathy. Cardiovascular: Regular rhythm, normal S1, S2. No murmur. No edema in lower extremities  Respiratory: Not using accessory muscles. Good inspiratory effort. Clear to auscultation bilaterally, no wheeze or crackles. GI: Abdomen soft, no tenderness, not distended, normal bowel sounds  Musculoskeletal: No cyanosis in digits, neck supple  Neurology: CN 2-12 grossly intact. No speech or motor deficits  Psych: Normal affect.  Alert and oriented in time, place and person  Skin: Warm, dry, normal turgor    Labs and Tests:  CBC:   Recent Labs     10/03/21  1430 10/03/21  2139 10/04/21  0520   HGB 8.6* 7.0* 6.6*     BMP:    Recent Labs     10/02/21  0350 10/03/21  0230 10/04/21  0520    142 141   K 3.9 3.2* 4.0   * 111* 111*   CO2 23 25 25   BUN 9 9 12   CREATININE 0.6 0.7 0.6   GLUCOSE 89 104* 99     Hepatic:   Recent Labs     10/02/21  0350 10/03/21  0230 10/04/21  0520   AST 55* 40* 31   ALT 22 19 16   BILITOT 0.4 <0.2 0.3   ALKPHOS 38* 37 41       Discussed care with family and patient             Spent 30  minutes with patient and family at bedside and on unit reviewing medical records and labs, spent greater than 50% time counseling patient and family on diagnosis and plan   Problem List  Active Problems:    Hypertension    Acute lower GI hemorrhage    Diverticulosis of colon with hemorrhage of large intestine    Acute blood loss anemia  Resolved Problems:    * No resolved hospital problems. *       Assessment & Plan:   1. Acute GI bleed  -Patient this is likely secondary diverticular  CTA negative-x3  Patient underwent EGD normal colonoscopy just pandiverticulosis no active bleeding  -Patient high risk for repeat colonoscopy  -Received 4 units of RBCs since admission last transfusion yesterday  -Globin dropped again today so we will give another unit. -Given drop will do RBC scan  -Further recs to follow.     Hypotension resolved            Diet: Diet NPO Exceptions are: Sips of Water with Meds, Ice Chips  Code:Full Code  DVT PPX lovenox       Lashae Dasilva MD   10/4/2021 11:03 AM

## 2021-10-04 NOTE — PROGRESS NOTES
Physical Therapy      Hold PT treatment this am per nsg. Pt currently receiving blood. Will follow-up as appropriate.    Thanks, Michael Cole, MARGOT [phone]

## 2021-10-04 NOTE — PROGRESS NOTES
Gastroenterology Progress Note            Elías Moody is a 80 y.o. female patient. 1. Hematochezia    2. Gastrointestinal hemorrhage associated with intestinal diverticulosis        SUBJECTIVE:  Pt passed more red blood overnight.  hgb down to 6.6. Denies ab pain. No cp or sob. Physical    VITALS:  BP (!) 128/41   Pulse 78   Temp 98.4 °F (36.9 °C) (Temporal)   Resp 26   Ht 5' 4\" (1.626 m)   Wt 120 lb 9.5 oz (54.7 kg)   SpO2 94%   BMI 20.70 kg/m²   TEMPERATURE:  Current - Temp: 98.4 °F (36.9 °C); Max - Temp  Av.4 °F (36.9 °C)  Min: 97.7 °F (36.5 °C)  Max: 99 °F (37.2 °C)    Abdomen soft, ND, NT, no HSM, Bowel sounds normal   Awake and alert  nad  eomi    Data      Recent Labs     10/03/21  1430 10/03/21  2139 10/04/21  0520   HGB 8.6* 7.0* 6.6*   HCT 25.4* 20.8* 19.5*     Recent Labs     10/02/21  0350 10/03/21  0230 10/04/21  0520    142 141   K 3.9 3.2* 4.0   * 111* 111*   CO2 23 25 25   PHOS 1.8* 2.1* 2.7   BUN 9 9 12   CREATININE 0.6 0.7 0.6     Recent Labs     10/02/21  0350 10/03/21  0230 10/04/21  0520   AST 55* 40* 31   ALT 22 19 16   BILITOT 0.4 <0.2 0.3   ALKPHOS 38* 43 41     No results for input(s): LIPASE, AMYLASE in the last 72 hours. ASSESSMENT   1. Hematochezia-  Now with recurrent episode.     Likely diverticular bleeding but unable to locate source due to colonoscopy interrupted for profound  bradycardia during procedure. 2. Acute blood loss anemia- 6.6 now and getting prbc.          PLAN    1. Cont to follow hgb  2. Will get tagged cell scan though it appears the bleeding was hours ago. consider IR vs surgery due to her apparent intolerance to colonoscopy   3. Advance diet  4.  If does ok today maybe home tomorrow.         Kristen Miranda MD  [address] [facility]

## 2021-10-05 LAB
HCT VFR BLD CALC: 21.4 % (ref 36–48)
HCT VFR BLD CALC: 22.2 % (ref 36–48)
HCT VFR BLD CALC: 22.8 % (ref 36–48)
HEMOGLOBIN: 7.2 G/DL (ref 12–16)
HEMOGLOBIN: 7.4 G/DL (ref 12–16)
HEMOGLOBIN: 7.8 G/DL (ref 12–16)

## 2021-10-05 PROCEDURE — G0008 ADMIN INFLUENZA VIRUS VAC: HCPCS | Performed by: INTERNAL MEDICINE

## 2021-10-05 PROCEDURE — 90686 IIV4 VACC NO PRSV 0.5 ML IM: CPT | Performed by: INTERNAL MEDICINE

## 2021-10-05 PROCEDURE — 6370000000 HC RX 637 (ALT 250 FOR IP): Performed by: INTERNAL MEDICINE

## 2021-10-05 PROCEDURE — 2580000003 HC RX 258: Performed by: HOSPITALIST

## 2021-10-05 PROCEDURE — 97530 THERAPEUTIC ACTIVITIES: CPT

## 2021-10-05 PROCEDURE — 85018 HEMOGLOBIN: CPT

## 2021-10-05 PROCEDURE — 97535 SELF CARE MNGMENT TRAINING: CPT

## 2021-10-05 PROCEDURE — 92610 EVALUATE SWALLOWING FUNCTION: CPT

## 2021-10-05 PROCEDURE — 85014 HEMATOCRIT: CPT

## 2021-10-05 PROCEDURE — 2580000003 HC RX 258: Performed by: PHYSICIAN ASSISTANT

## 2021-10-05 PROCEDURE — 6360000002 HC RX W HCPCS: Performed by: INTERNAL MEDICINE

## 2021-10-05 PROCEDURE — 97116 GAIT TRAINING THERAPY: CPT

## 2021-10-05 PROCEDURE — 2000000000 HC ICU R&B

## 2021-10-05 PROCEDURE — 92526 ORAL FUNCTION THERAPY: CPT

## 2021-10-05 PROCEDURE — 36592 COLLECT BLOOD FROM PICC: CPT

## 2021-10-05 RX ORDER — METOPROLOL SUCCINATE 25 MG/1
25 TABLET, EXTENDED RELEASE ORAL DAILY
Qty: 30 TABLET | Refills: 0 | Status: SHIPPED | OUTPATIENT
Start: 2021-10-05

## 2021-10-05 RX ORDER — PANTOPRAZOLE SODIUM 40 MG/1
40 TABLET, DELAYED RELEASE ORAL
Qty: 30 TABLET | Refills: 3 | Status: SHIPPED | OUTPATIENT
Start: 2021-10-06

## 2021-10-05 RX ORDER — LOSARTAN POTASSIUM 50 MG/1
50 TABLET ORAL DAILY
Qty: 30 TABLET | Refills: 3 | Status: SHIPPED | OUTPATIENT
Start: 2021-10-05

## 2021-10-05 RX ADMIN — POTASSIUM & SODIUM PHOSPHATES POWDER PACK 280-160-250 MG 250 MG: 280-160-250 PACK at 20:38

## 2021-10-05 RX ADMIN — METOPROLOL SUCCINATE 50 MG: 50 TABLET, EXTENDED RELEASE ORAL at 08:15

## 2021-10-05 RX ADMIN — POTASSIUM & SODIUM PHOSPHATES POWDER PACK 280-160-250 MG 250 MG: 280-160-250 PACK at 17:07

## 2021-10-05 RX ADMIN — POTASSIUM & SODIUM PHOSPHATES POWDER PACK 280-160-250 MG 250 MG: 280-160-250 PACK at 08:18

## 2021-10-05 RX ADMIN — Medication 10 ML: at 08:18

## 2021-10-05 RX ADMIN — LOSARTAN POTASSIUM 50 MG: 25 TABLET, FILM COATED ORAL at 08:17

## 2021-10-05 RX ADMIN — POTASSIUM & SODIUM PHOSPHATES POWDER PACK 280-160-250 MG 250 MG: 280-160-250 PACK at 12:50

## 2021-10-05 RX ADMIN — Medication 10 ML: at 08:17

## 2021-10-05 RX ADMIN — Medication 10 ML: at 20:38

## 2021-10-05 RX ADMIN — INFLUENZA A VIRUS A/VICTORIA/2570/2019 IVR-215 (H1N1) ANTIGEN (PROPIOLACTONE INACTIVATED), INFLUENZA A VIRUS A/CAMBODIA/E0826360/2020 IVR-224 (H3N2) ANTIGEN (PROPIOLACTONE INACTIVATED), INFLUENZA B VIRUS B/VICTORIA/705/2018 BVR-11 ANTIGEN (PROPIOLACTONE INACTIVATED), INFLUENZA B VIRUS B/PHUKET/3073/2013 BVR-1B ANTIGEN (PROPIOLACTONE INACTIVATED) 0.5 ML: 15; 15; 15; 15 INJECTION, SUSPENSION INTRAMUSCULAR at 08:20

## 2021-10-05 RX ADMIN — PANTOPRAZOLE SODIUM 40 MG: 40 TABLET, DELAYED RELEASE ORAL at 06:30

## 2021-10-05 RX ADMIN — AMLODIPINE BESYLATE 5 MG: 5 TABLET ORAL at 08:16

## 2021-10-05 ASSESSMENT — PAIN SCALES - GENERAL
PAINLEVEL_OUTOF10: 0

## 2021-10-05 ASSESSMENT — PAIN SCALES - WONG BAKER
WONGBAKER_NUMERICALRESPONSE: 0
WONGBAKER_NUMERICALRESPONSE: 0

## 2021-10-05 NOTE — PROGRESS NOTES
Physical Therapy  Facility/Department: [facility] ICU  Daily Treatment Note  NAME: Jose Ramon Grewal  : 1933  MRN: [id number]    Date of Service: 10/5/2021    Discharge Recommendations:  Jose Ramon Grewal scored a 15/24 on the AM-PAC short mobility form. Current research shows that an AM-PAC score of 18 or greater is typically associated with a discharge to the patient's home setting. Based on the patient's AM-PAC score and their current functional mobility deficits, it is recommended that the patient have 2-3 sessions per week of Physical Therapy at d/c to increase the patient's independence. At this time, this patient demonstrates the endurance and safety to discharge home with Davion Clark PT  and a follow up treatment frequency of 2-3x/wk. Please see assessment section for further patient specific details. 24 hour supervision  If patient discharges prior to next session this note will serve as a discharge summary. Please see below for the latest assessment towards goals. HOME HEALTH CARE: LEVEL 3 SAFETY     - Initial home health evaluation to occur within 24-48 hours, in patient home   - Therapy evaluations in home within 24-48 hours of discharge; including DME and home safety   - Frontload therapy 5 days, then 3x a week   - Therapy to evaluate if patient has [id number] needs for personal care   -  evaluation within 24-48 hours, includes evaluation of resources and insurance to determine [place], [place], LTC, and Medicaid options         PT Equipment Recommendations  Equipment Needed: Yes  Other: Gait belt (discussed with family about purchasing in the community prior to pt d/c); family has RW at home, recommend pt use RW initially upon d/c and progress to [facility] with home therapy    Assessment   Body structures, Functions, Activity limitations: Decreased functional mobility ; Decreased safe awareness;Decreased balance;Decreased coordination;Decreased ADL status  Assessment: Pt demonstrated an ability to ambulate longer distances today but continues to require multiple verbal and tactile cues for proper body mechanics with all transitional movements and walker negotiation to avoid obstacles as well as standing closer to her walker. Pt requires minimal to moderate assistance for all transfers secondary to posterior lean. Pt will continue to benefit from skilled PT in order to increase endurance and functional mobility for safe ambulation at home. Treatment Diagnosis: Impaired functional mobility due to poor motor planning and decreased balance  Prognosis: Good  Decision Making: Low Complexity  Clinical Presentation: Stable  PT Education: Goals;PT Role;Plan of Care;Transfer Training;General Safety  Patient Education: Pt considered questionnable historian but daughter was present to verbalize understanding. Barriers to Learning: Cognition (dementia)  REQUIRES PT FOLLOW UP: Yes  Activity Tolerance  Activity Tolerance: Patient Tolerated treatment well;Patient limited by cognitive status     Patient Diagnosis(es): The primary encounter diagnosis was Hematochezia. Diagnoses of Gastrointestinal hemorrhage associated with intestinal diverticulosis and Essential hypertension were also pertinent to this visit. has a past medical history of Chronic back pain, CKD (chronic kidney disease) stage 3, GFR 30-59 ml/min ([facility]), Dementia ([facility] [address] and Hypertension. has a past surgical history that includes Toe Surgery; Hysterectomy, total abdominal; hernia repair (Left, 4/2/2020); Upper gastrointestinal endoscopy (N/A, 9/30/2021); and Colonoscopy (N/A, 9/30/2021). Restrictions  Restrictions/Precautions  Restrictions/Precautions: Fall Risk  Required Braces or Orthoses?: No  Position Activity Restriction  Other position/activity restrictions: 80 y.o. female who presented with GI bleed. Patient was at adult  when she noted some clots. Patient said noted some bright red blood and clots in the stool. On the depends.   Patient does take aspirin. Denies any significant symptoms. A couple episodes today. No dyspnea no chest pain no history of GI bleed previously recently. 9/30: pancolonic diverticulosis with blood throughout colon. EGD normal. Difficult colon with 2 episodes of severe bradycardia requiring w/d of scope. CTA x 3 neg for active bleeding    Subjective   General  Chart Reviewed: Yes  Response To Previous Treatment: Patient with no complaints from previous session. Family / Caregiver Present: Yes (Daughter present)  Subjective  Subjective: Pt reported to be in no pain today and agreed to receive therapy  General Comment  Comments: Pt was lying supine with HOB elevated upon arrival  Pain Screening  Patient Currently in Pain: Denies  Vital Signs  Patient Currently in Pain: Denies       Orientation  Orientation  Overall Orientation Status: Impaired  Orientation Level: Oriented to person;Disoriented to place; Disoriented to time;Disoriented to situation    Cognition   Cognition  Overall Cognitive Status: Exceptions  Arousal/Alertness: Appropriate responses to stimuli  Following Commands: Follows one step commands with repetition  Attention Span: Attends with cues to redirect; Difficulty attending to directions  Memory: Decreased recall of recent events;Decreased short term memory;Decreased long term memory  Safety Judgement: Decreased awareness of need for assistance;Decreased awareness of need for safety  Problem Solving: Assistance required to generate solutions;Assistance required to implement solutions;Decreased awareness of errors  Insights: Decreased awareness of deficits  Initiation: Requires cues for some  Sequencing: Requires cues for some    Objective   Bed mobility  Rolling to Left: Minimal assistance (Pt required verbal cueing w/ min A x1 in order to roll to L)  Supine to Sit: Minimal assistance (Pt required min A x1 to maintain upright balance from sidelying to EOB)  Comment: HOB slightly elevated  Transfers  Sit to Stand: Minimal Assistance (Pt required min A x1 from sit to stand secondary to slight posterior lean and forward flexed trunk)  Stand to sit: Minimal Assistance (Pt required min A x1 in order to control descent into chair)  Comment: Pt required tactile and verbal cues to relax her legs out of extension for proper placement prior to standing from EOB. Pt demonstrates posterior lean with sitting on EOB and in standing. Pt was transferred from EOB and recliner chair. Ambulation  Ambulation?: Yes  More Ambulation?: Yes  Ambulation 1  Surface: level tile  Device: Rolling Walker  Assistance: Minimal assistance  Quality of Gait: Forward flexed trunk  Gait Deviations: Slow Radha;Decreased step length;Decreased step height  Distance: 10'  Comments: Pt ambulated from EOB to bathroom min A x1 requiring verbal and tactile cues for walker navigation. Ambulation 2  Surface - 2: level tile  Device 2: Rolling Walker  Assistance 2: Minimal assistance  Gait Deviations: Slow Radha;Decreased step length;Decreased step height  Distance: 270'  Comments: Pt ambulated from bathroom to hallway for one loop on her floor min A x1 requiring verbal and tactile cues for walker navigation and proper placement of the walker in front of her  Stairs/Curb  Stairs?: No     Balance  Posture: Fair (Forward flexed trunk)  Sitting - Static: Good (Pt was able to sit on EOB SBA up to 5 mins with no LOB)  Sitting - Dynamic: Good (Pt was able to sit on EOB SBA up to 5 mins with no LOB)  Standing - Static: Fair (Pt was able to stand at sink to wash hands up to 2 mins CGA x1 w/ no LOB)  Standing - Dynamic: Fair;- (Pt was able to perform toiletting min Ax1 up to 5 mins w/o LOB)  Comments: Pt was provided verbal cueing to relax extended legs while sitting on EOB. Pt was able to carry over verbal cueing to relax BLE.   Exercises  Hip Flexion: Pt performed 20 marches from recliner chair bilaterally  Knee Long Arc Quad: Pt performed 10 reps bilaterally LAQ from recliner chair   AROM RLE (degrees)  RLE AROM: WFL  AROM LLE (degrees)  LLE AROM : WFL  Strength RLE  Strength RLE: WFL  Strength LLE  Strength LLE: WFL       AM-PAC Score  AM-PAC Inpatient Mobility Raw Score : 15 (10/05/21 1142)  AM-PAC Inpatient T-Scale Score : 39.45 (10/05/21 1142)  Mobility Inpatient CMS 0-100% Score: 57.7 (10/05/21 1142)  Mobility Inpatient CMS G-Code Modifier : CK (10/05/21 1142)       Goals  Short term goals  Time Frame for Short term goals: Before discharge  Short term goal 1: Pt will complete bed mobility indep  Short term goal 2: Pt will complete sit<>stand with SBA  Short term goal 3: Pt will ambulate 100 ft with LRAD and SBA  Short term goal 4: PT will ascend/decsend 1 step without rail, with LRAD, and CGA  Patient Goals   Patient goals : Go home with help from family  No goals met  Plan    Plan  Times per week: 3-5x  Times per day: Daily  Current Treatment Recommendations: Strengthening, Functional Mobility Training, IADL Training, Neuromuscular Re-education, Home Exercise Program, Safety Education & Training, Patient/Caregiver Education & Training, Equipment Evaluation, Education, & procurement, Transfer Training, Gait Training, Stair training, Balance Training, Endurance Training  Safety Devices  Type of devices: All fall risk precautions in place, Left in chair, Gait belt, Call light within reach, Patient at risk for falls, Nurse notified, Chair alarm in place  Restraints  Initially in place: No     Therapy Time   Individual Concurrent Group Co-treatment   Time In 1044     1044   Time Out 1114     1114   Minutes 30     30   Timed Code Treatment Minutes: 30 Minutes    Total Treatment Minutes:  [address], SPT   PT observed and directed the above evaluation/treatment.   [address], DPT [id number]

## 2021-10-05 NOTE — PLAN OF CARE
Mobility - Impaired:  Goal: Mobility will improve to maximum level  Description: Mobility will improve to maximum level  Outcome: Ongoing     Problem: Nutrition Deficit:  Goal: Ability to achieve adequate nutritional intake will improve  Description: Ability to achieve adequate nutritional intake will improve  Outcome: Ongoing     Problem: Pain:  Goal: Pain level will decrease  Description: Pain level will decrease  Outcome: Ongoing  Goal: Ability to notify healthcare provider of pain before it becomes unmanageable or unbearable will improve  Description: Ability to notify healthcare provider of pain before it becomes unmanageable or unbearable will improve  Outcome: Ongoing  Goal: Control of acute pain  Description: Control of acute pain  Outcome: Ongoing  Goal: Control of chronic pain  Description: Control of chronic pain  Outcome: Ongoing     Problem: Serum Glucose Level - Abnormal:  Goal: Ability to maintain appropriate glucose levels will improve to within specified parameters  Description: Ability to maintain appropriate glucose levels will improve to within specified parameters  Outcome: Ongoing     Problem: Skin Integrity - Impaired:  Goal: Will show no infection signs and symptoms  Description: Will show no infection signs and symptoms  Outcome: Ongoing  Goal: Absence of new skin breakdown  Description: Absence of new skin breakdown  Outcome: Ongoing     Problem: Sleep Pattern Disturbance:  Goal: Appears well-rested  Description: Appears well-rested  Outcome: Ongoing     Problem: Fluid Volume - Imbalance:  Goal: Will show no signs and symptoms of excessive bleeding  Description: Will show no signs and symptoms of excessive bleeding  Outcome: Ongoing  Goal: Absence of imbalanced fluid volume signs and symptoms  Description: Absence of imbalanced fluid volume signs and symptoms  Outcome: Ongoing     Problem: Nausea/Vomiting:  Goal: Ability to achieve adequate nutritional intake will improve  Description: Ability to achieve adequate nutritional intake will improve  Outcome: Ongoing  Goal: Absence of nausea/vomiting  Description: Absence of nausea/vomiting  Outcome: Ongoing  Goal: Able to drink  Description: Able to drink  Outcome: Ongoing  Goal: Able to eat  Description: Able to eat  Outcome: Ongoing

## 2021-10-05 NOTE — PROGRESS NOTES
Speech Language Pathology  Facility/Department: [facility] ICU  Initial Assessment  DYSPHAGIA BEDSIDE SWALLOW EVALUATION     Patient: Stephen Shankar   : 1933   MRN: [id number]      Evaluation Date: 10/5/2021   Admitting Diagnosis: Hematochezia [K92.1]  GI bleed [K92.2]  Acute lower GI hemorrhage [K92.2]  Pain: Pt denies pain at this time                         H&P: Stephen Shankar is a 80 y.o. female who presents to the emergency department with her daughter with GI bleed. Patient has history of dementia and is often nonverbal.  Daughter states she dropped her off at her adult  and then was called when she had a bowel movement that just showed bright red blood in the stool. She has had a little bit of bright red blood in her depends. She is on aspirin but denies any other anticoagulants. Daughter states that she ate fine today and there is been no vomiting, fevers. Has history of surgery for small bowel obstruction and hernia. There is been no decreased urination, hematuria, difficulty breathing or any other symptoms noted by daughter at home. Chest xray:  Impression   1.  Hyperinflated lungs with old granulomatous disease.       2.  NG tube tip is near the GE junction and should be advanced by 10-15 cm.         History/Prior Level of Function:   Living Status: Home with daughter    Prior Dysphagia History: Per chart review and Pt/daughter report, no prior dysphagia history identified. However, Pt's daughter reports recent onset of \"swishing liquids in her mouth\" before swallowing them. Dysphagia Impressions/Diagnosis: Oropharyngeal Dysphagia   Pt alert and responsive positioned upright in chair. No overt facial weakness/droop noted at rest. Moderately reduced oral motor strength, ROM and coordination noted with completion of OME. Mild/mod reduced bolus control and A-P propulsion noted with all textures.  Intermittent oral \"holding\" with suspected tongue rocking to propel bolus to pharynx noted with thin liquids via cup. Oral \"holding\" with increased effort to propel bolus to pharynx occurred only with thin liquid, consistent with sensory delayed palatal retraction with thins. Clinical symptoms of mild delayed swallow initiation, mild reduced laryngeal excursion and suspected delayed pharyngeal clearing noted with all textures. No overt signs of aspiration noted with any texture. However, precautions should be followed to minimize aspiration potential with all textures due to apparent decline in oropharyngeal function and intermittent oral \"holding\" with thin liquids. Recommended Diet and Intervention 10/5/2021:  Diet Solids Recommendation:  Full liquid diet (diet advance to regular as indicated per GI) Liquid Consistency Recommendation: Thin liquids Recommended form of Meds:   Whole or crushed with applesauce     Compensatory Swallowing Strategies: Alternate solids/liquids , Upright as possible with all PO intake , Assist Feed , Small bites/sips , Eat/feed slowly    SHORT TERM DYSPHAGIA GOALS/PLAN OF CARE: Speech therapy for dysphagia tx 3-5 times per week during acute care stay. 1. Pt will functionally tolerate recommended diet with no overt clinical s/s of aspiration  2. Pt will demonstrate understanding of aspiration risk and precautions via education/demonstration with occasional prompting  3. Pt will advance to least restrictive diet as indicated   4. If clinical s/s of aspiration/penetration continue to be noted, Pt will participate in Modified Barium Swallow Study   5.  Pt will demonstrate improved sensory motor function via targeted exercises       Dysphagia Therapeutic Intervention:  Diet Tolerance Monitoring , Patient/Family Education     Discharge Recommendations: Do not anticipate need for further speech/dysphagia therapy upon discharge from hospital     Patient Positioning: Upright in chair    Current Diet Level (prior to evaluation): Full liquid per GI; Regular diet at home prior to admission per daughter    Respiratory Status:   [x]Room Air   []O2 via nasal cannula   []Other:    Dentition:  []Adequate  [x]Dentures   []Missing Many Teeth  []Edentulous  []Other:    Baseline Vocal Quality:  []Normal  []Dysphonic   []Aphonic   []Hoarse  []Wet  [x]Weak  []Other:      Volitional Swallow:   []Absent   [x]Delayed     []Adequate     []Required use of drink     Oral Mechanism Exam:  []WFL [x]Mild   [x] Moderate  []Severe  []To be assessed  Impaired:   []Left side      []Right side    [x]Labial ROM/Coordination    []Labial Symmetry   [x]Lingual ROM/Coordination   []Lingual Symmetry  []Gag  []Other:     Oral Phase: []WFL [x]Mild   [x] Moderate  []Severe  []To be assessed   [x]Impaired/Prolonged Mastication:   []Spillage Left:   []Spillage Right:  []Pocketing Left:   []Pocketing Right:   [x]Decreased Anterior to Posterior Transit:   [x]Suspected Premature Bolus Loss:   []Lingual/Palatal Residue:   [x]Other: Intermittent oral 'holding\" with rocking with thins/cup    Pharyngeal Phase: []WFL [x]Mild   [] Moderate  []Severe  []To be assessed   [x]Delayed Swallow:   []Suspected Pharyngeal Pooling:   [x]Decreased Laryngeal Elevation:   []Absent Swallow:  []Wet Vocal Quality:   []Throat Clearing-Immediate:   []Throat Clearing-Delayed:   []Cough-Immediate:   []Cough-Delayed:  []Change in Vital Signs:  [x]Suspected Delayed Pharyngeal Clearing:  []Other:       Eating Assistance:  []Independent  []Setup or clean-up assistance   [x] Supervision or touching assistance   [] Partial or moderate assistance   [] Substantial or maximal assistance  [] Dependent       EDUCATION:   Provided education regarding role of SLP, results of assessment, recommendations and general speech pathology plan of care. [x] Pt verbalized understanding and agreement   [x] Pt requires ongoing learning   [] No evidence of comprehension     If patient discharges prior to next visit, this note will serve as discharge.      Timed Code Minutes: 0 min   Total Treatment Minutes: 30 min    Real Shackle [id number]

## 2021-10-05 NOTE — PROGRESS NOTES
[facility] PROGRESS NOTE    10/5/2021 7:16 AM        Name: Kam Torrez . Admitted: 9/29/2021  Primary Care Provider: Donna العراقي MD (Tel: [phone])                        Subjective:  . No acute events overnight. Resting well. Pain control. Diet ok. Labs reviewed  Denies any chest pain sob.    Hemoglobin still trended down  Reviewed interval ancillary notes    Current Medications  0.9 % sodium chloride infusion, PRN  sodium chloride flush 0.9 % injection 5-40 mL, BID  potassium & sodium phosphates (PHOS-NAK) 280-160-250 MG packet 250 mg, 4x Daily  0.9 % sodium chloride infusion, PRN  losartan (COZAAR) tablet 50 mg, Daily  influenza quadrivalent split vaccine (FLUZONE;FLUARIX;FLULAVAL;AFLURIA) injection 0.5 mL, Prior to discharge  amLODIPine (NORVASC) tablet 5 mg, Daily  0.9 % sodium chloride infusion, PRN  ziprasidone (GEODON) injection 10 mg, Q6H PRN  melatonin tablet 10.5 mg, Nightly PRN  0.9 % sodium chloride infusion, PRN  metoprolol succinate (TOPROL XL) extended release tablet 50 mg, Daily  sodium chloride flush 0.9 % injection 5-40 mL, 2 times per day  sodium chloride flush 0.9 % injection 5-40 mL, PRN  0.9 % sodium chloride infusion, PRN  ondansetron (ZOFRAN-ODT) disintegrating tablet 4 mg, Q8H PRN   Or  ondansetron (ZOFRAN) injection 4 mg, Q6H PRN  polyethylene glycol (GLYCOLAX) packet 17 g, Daily PRN  acetaminophen (TYLENOL) tablet 650 mg, Q6H PRN   Or  acetaminophen (TYLENOL) suppository 650 mg, Q6H PRN  pantoprazole (PROTONIX) tablet 40 mg, QAM AC  potassium chloride 20 mEq/50 mL IVPB (Central Line), PRN  sodium phosphate 8.85 mmol in dextrose 5 % 250 mL IVPB, PRN   Or  sodium phosphate 17.7 mmol in dextrose 5 % 250 mL IVPB, PRN  magnesium sulfate 1000 mg in dextrose 5% 100 mL IVPB, PRN        Objective:  /68   Pulse 87   Temp 99.6 °F (37.6 °C) (Temporal)   Resp 16   Ht 5' 4\" (1.626 m)   Wt 123 lb 7.3 oz (56 kg)   SpO2 99%   BMI 21.19 kg/m²     Intake/Output Summary (Last 24 hours) at 10/5/2021 0716  Last data filed at 10/5/2021 0510  Gross per 24 hour   Intake 657 ml   Output 1150 ml   Net -493 ml      Wt Readings from Last 3 Encounters:   10/05/21 123 lb 7.3 oz (56 kg)   04/12/21 111 lb (50.3 kg)   02/23/21 111 lb (50.3 kg)       General appearance:  Appears comfortable  Eyes: Sclera clear. Pupils equal.  ENT: Moist oral mucosa. Trachea midline, no adenopathy. Cardiovascular: Regular rhythm, normal S1, S2. No murmur. No edema in lower extremities  Respiratory: Not using accessory muscles. Good inspiratory effort. Clear to auscultation bilaterally, no wheeze or crackles. GI: Abdomen soft, no tenderness, not distended, normal bowel sounds  Musculoskeletal: No cyanosis in digits, neck supple  Neurology: CN 2-12 grossly intact. No speech or motor deficits  Psych: Normal affect. Alert and oriented in time, place and person  Skin: Warm, dry, normal turgor    Labs and Tests:  CBC:   Recent Labs     10/04/21  1310 10/04/21  2048 10/05/21  0243   HGB 8.5* 7.8* 7.8*     BMP:    Recent Labs     10/03/21  0230 10/04/21  0520    141   K 3.2* 4.0   * 111*   CO2 25 25   BUN 9 12   CREATININE 0.7 0.6   GLUCOSE 104* 99     Hepatic:   Recent Labs     10/03/21  0230 10/04/21  0520   AST 40* 31   ALT 19 16   BILITOT <0.2 0.3   ALKPHOS 37 41       Discussed care with family and patient             Spent 30  minutes with patient and family at bedside and on unit reviewing medical records and labs, spent greater than 50% time counseling patient and family on diagnosis and plan   Problem List  Active Problems:    Hypertension    Acute lower GI hemorrhage    Diverticulosis of colon with hemorrhage of large intestine    Acute blood loss anemia  Resolved Problems:    * No resolved hospital problems. *       Assessment & Plan:   1.  Acute GI bleed  -Patient this is likely secondary diverticular  CTA negative-x3  Patient underwent EGD normal colonoscopy just pandiverticulosis no active bleeding  -Patient high risk for repeat colonoscopy  -Received 5 units of RBCs since admission last transfusion yesterday  -hgb has removed stable  - RBC scan no acute findings  - if remains stable plan for discharge if cleared by GI     Hypotension resolved            Diet: ADULT DIET;  Clear Liquid  Code:Full Code  DVT PPX lovenox       Bethanie Brooks MD   10/5/2021 7:16 AM

## 2021-10-05 NOTE — PROGRESS NOTES
Patient up to the commode with assistance. Episode of red and black stool noted in flowsheet. Returned to bed with fall precautions in place.

## 2021-10-05 NOTE — PROGRESS NOTES
Gastroenterology Progress Note            David Genao is a 80 y.o. female patient. 1. Hematochezia    2. Gastrointestinal hemorrhage associated with intestinal diverticulosis    3. Essential hypertension        SUBJECTIVE:  Had BM at 8:30 last night charted as red and black. None since. Denies ab pain. No cp or sob. Physical    VITALS:  /62   Pulse 77   Temp 98.4 °F (36.9 °C) (Temporal)   Resp 14   Ht 5' 4\" (1.626 m)   Wt 123 lb 7.3 oz (56 kg)   SpO2 99%   BMI 21.19 kg/m²   TEMPERATURE:  Current - Temp: 98.4 °F (36.9 °C); Max - Temp  Av.9 °F (37.2 °C)  Min: 98.4 °F (36.9 °C)  Max: 99.6 °F (37.6 °C)    Abdomen soft, ND, NT, no HSM, Bowel sounds normal   Awake and alert  nad  eomi    Data      Recent Labs     10/04/21  2048 10/05/21  0243 10/05/21  1035   HGB 7.8* 7.8* 7.4*   HCT 23.5* 22.8* 22.2*     Recent Labs     10/03/21  0230 10/04/21  0520    141   K 3.2* 4.0   * 111*   CO2 25 25   PHOS 2.1* 2.7   BUN 9 12   CREATININE 0.7 0.6     Recent Labs     10/03/21  0230 10/04/21  0520   AST 40* 31   ALT 19 16   BILITOT <0.2 0.3   ALKPHOS 43 41     No results for input(s): LIPASE, AMYLASE in the last 72 hours. ASSESSMENT   1. Hematochezia-    Likely diverticular bleeding but unable to locate source due to colonoscopy interrupted for profound  bradycardia during procedure. Now with recurrent episode  night. Tagged RBC Monday was neg. Passed red and black blood per rectum last night which was likely old blood. 2. Acute blood loss anemia- hgb incremented more than expected from 6.6 to 8.5 with 1 u prbc Monday. hgb 7.8 on repeat and is 7.4 today which is near where she should be after 1 u prbc.         PLAN    1. Cont to follow hgb  2. Advance to full liquids      Discussed with Dr. Latricia Garcia PA-C  [facility]      I have personally performed a face to face diagnostic evaluation on this patient.   I have interviewed and examined the patient and I agree with the findings and recommended plan of care. In summary, my findings and plan are the following:  Passed blood yesterday but tagged cell neg. No further bleeding hgb improved and stable after prbc. No ab pain. Ab nt. Will cont to follow.        Vega Archuleta MD  [address]

## 2021-10-05 NOTE — PROGRESS NOTES
Patient assisted to the commode. Small amount of urine voided. Small smear of bright red blood noted during tom-care. Patient returned to bed with fall precautions in place.

## 2021-10-05 NOTE — PROGRESS NOTES
Occupational Therapy  Facility/Department: [facility] ICU  Daily Treatment Note  NAME: Trini Moon  : 1933  MRN: [id number]    Date of Service: 10/5/2021    Discharge Recommendations:Pina Ross scored a 17/24 on the AM-PAC ADL Inpatient form. Current research shows that an AM-PAC score of 18 or greater is typically associated with a discharge to the patient's home setting. Based on the patient's AM-PAC score, and their current ADL deficits, it is recommended that the patient have 2-3 sessions per week of Occupational Therapy at d/c to increase the patient's independence. At this time, this patient demonstrates the endurance and safety to discharge home with HOME (home vs OP services) and a follow up treatment frequency of 2-3x/wk. Please see assessment section for further patient specific details. If patient discharges prior to next session this note will serve as a discharge summary. Please see below for the latest assessment towards goals. HOME HEALTH CARE: LEVEL 3 SAFETY     - Initial home health evaluation to occur within 24-48 hours, in patient home   - Therapy evaluations in home within 24-48 hours of discharge; including DME and home safety   - Frontload therapy 5 days, then 3x a week   - Therapy to evaluate if patient has [id number] needs for personal care   -  evaluation within 24-48 hours, includes evaluation of resources and insurance to determine [place], [place], LTC, and Medicaid options         OT Equipment Recommendations  Equipment Needed: No    Assessment   Performance deficits / Impairments: Decreased functional mobility ; Decreased endurance;Decreased ADL status; Decreased high-level IADLs;Decreased balance;Decreased cognition  Assessment: Patient presents below baseline d/t above deficits, OT indicated to maximize safety/independence with ADL and IADL  Treatment Diagnosis: Above deficits associated with hematochezia  Prognosis: Good  OT Education: OT Role;Plan of supine in bed, pleasant and cooperative with therapy session  Pre Treatment Pain Screening  Intervention List: Patient able to continue with treatment;Nurse/Physician notified  Vital Signs  Patient Currently in Pain: Denies   Orientation  Orientation  Overall Orientation Status: Impaired  Orientation Level: Oriented to person;Disoriented to time;Disoriented to situation;Oriented to place  Objective    ADL  Feeding: Verbal cueing;Setup;Minimal assistance (Daughter assisting with broth upon arrival)  Grooming: Stand by assistance;Setup;Verbal cueing (washing face sitting in recliner, washing hands in stance following toileting)  LE Bathing: Verbal cueing;Setup;Minimal assistance (LEs and periarea)  LE Dressing: Minimal assistance; Moderate assistance (changing depends)  Toileting: Minimal assistance; Moderate assistance (cues to sequence tasks (clothing management, thorough tom care))        Balance  Standing Balance: Minimal assistance (CGA to Scott)  Standing Balance  Time: ~270 feet Scott with RW (assist to steer as pt veering R, cues to maintain proximity to RW)  Wheelchair Bed Transfers  Wheelchair/Bed - Technique: Ambulating  Equipment Used: Bed;Other  Level of Asssistance: Minimal assistance;Contact guard assistance (with RW, cues to sequence and for hand placement)  Bed mobility  Rolling to Left: Minimal assistance (verbal cues to sequence and for hand placement)  Rolling to Right: Minimal assistance  Supine to Sit: Minimal assistance  Scooting: Minimal assistance  Transfers  Sit to stand: Minimal assistance  Stand to sit: Minimal assistance                       Cognition  Overall Cognitive Status: Exceptions  Arousal/Alertness: Appropriate responses to stimuli  Following Commands: Follows one step commands with repetition  Attention Span: Attends with cues to redirect; Difficulty attending to directions  Memory: Decreased recall of recent events;Decreased short term memory;Decreased long term memory  Safety Judgement: Decreased awareness of need for assistance;Decreased awareness of need for safety  Problem Solving: Assistance required to generate solutions;Assistance required to implement solutions;Decreased awareness of errors  Insights: Decreased awareness of deficits  Initiation: Requires cues for some  Sequencing: Requires cues for some                                         Plan   Plan  Times per week: 3-5  Times per day: Daily  Current Treatment Recommendations: Strengthening, Patient/Caregiver Education & Training, Equipment Evaluation, Education, & procurement, Balance Training, Functional Mobility Training, Self-Care / ADL, Safety Education & Training  AM-PAC Score        AM-PAC Inpatient Daily Activity Raw Score: 17 (10/05/21 1321)  AM-PAC Inpatient ADL T-Scale Score : 37.26 (10/05/21 1321)  ADL Inpatient CMS 0-100% Score: 50.11 (10/05/21 1321)  ADL Inpatient CMS G-Code Modifier : CK (10/05/21 1321)    Goals  Short term goals  Time Frame for Short term goals: discharge  Short term goal 1: UB ADL mod I-  Short term goal 2: LB ADL supervision-min to modA 10/5  Short term goal 3: Fxl transfers [address] 10/5  Short term goal 4: Fxl mob supervision-CGA to [address] term goal 5: Grooming supervision  Long term goals  Time Frame for Long term goals : LTG=STG       Therapy Time   Individual Concurrent Group Co-treatment   Time In       6521   Time Out       1114   Minutes       30        Timed Code Treatment Minutes:  30 Minutes    Total Treatment Minutes:  [address] [facility], OTR/L [id number]

## 2021-10-06 LAB
ANION GAP SERPL CALCULATED.3IONS-SCNC: 7 MMOL/L (ref 3–16)
BUN BLDV-MCNC: 8 MG/DL (ref 7–20)
CALCIUM SERPL-MCNC: 7.5 MG/DL (ref 8.3–10.6)
CHLORIDE BLD-SCNC: 113 MMOL/L (ref 99–110)
CO2: 23 MMOL/L (ref 21–32)
CREAT SERPL-MCNC: 0.6 MG/DL (ref 0.6–1.2)
GFR AFRICAN AMERICAN: >60
GFR NON-AFRICAN AMERICAN: >60
GLUCOSE BLD-MCNC: 141 MG/DL (ref 70–99)
GLUCOSE BLD-MCNC: 96 MG/DL (ref 70–99)
HCT VFR BLD CALC: 21.5 % (ref 36–48)
HEMOGLOBIN: 7.3 G/DL (ref 12–16)
HEMOGLOBIN: 8.3 G/DL (ref 12–16)
MAGNESIUM: 2.4 MG/DL (ref 1.8–2.4)
PERFORMED ON: ABNORMAL
PHOSPHORUS: 3.8 MG/DL (ref 2.5–4.9)
POTASSIUM SERPL-SCNC: 4 MMOL/L (ref 3.5–5.1)
SODIUM BLD-SCNC: 143 MMOL/L (ref 136–145)

## 2021-10-06 PROCEDURE — 36592 COLLECT BLOOD FROM PICC: CPT

## 2021-10-06 PROCEDURE — 6370000000 HC RX 637 (ALT 250 FOR IP): Performed by: INTERNAL MEDICINE

## 2021-10-06 PROCEDURE — 2000000000 HC ICU R&B

## 2021-10-06 PROCEDURE — 2580000003 HC RX 258: Performed by: HOSPITALIST

## 2021-10-06 PROCEDURE — 85014 HEMATOCRIT: CPT

## 2021-10-06 PROCEDURE — 84100 ASSAY OF PHOSPHORUS: CPT

## 2021-10-06 PROCEDURE — 2580000003 HC RX 258: Performed by: PHYSICIAN ASSISTANT

## 2021-10-06 PROCEDURE — 83735 ASSAY OF MAGNESIUM: CPT

## 2021-10-06 PROCEDURE — 85018 HEMOGLOBIN: CPT

## 2021-10-06 PROCEDURE — 92526 ORAL FUNCTION THERAPY: CPT

## 2021-10-06 PROCEDURE — 80048 BASIC METABOLIC PNL TOTAL CA: CPT

## 2021-10-06 RX ADMIN — PANTOPRAZOLE SODIUM 40 MG: 40 TABLET, DELAYED RELEASE ORAL at 06:05

## 2021-10-06 RX ADMIN — AMLODIPINE BESYLATE 5 MG: 5 TABLET ORAL at 08:29

## 2021-10-06 RX ADMIN — Medication 10 ML: at 20:20

## 2021-10-06 RX ADMIN — METOPROLOL SUCCINATE 50 MG: 50 TABLET, EXTENDED RELEASE ORAL at 08:29

## 2021-10-06 RX ADMIN — POTASSIUM & SODIUM PHOSPHATES POWDER PACK 280-160-250 MG 250 MG: 280-160-250 PACK at 14:04

## 2021-10-06 RX ADMIN — LOSARTAN POTASSIUM 50 MG: 25 TABLET, FILM COATED ORAL at 08:29

## 2021-10-06 RX ADMIN — POTASSIUM & SODIUM PHOSPHATES POWDER PACK 280-160-250 MG 250 MG: 280-160-250 PACK at 20:21

## 2021-10-06 RX ADMIN — POTASSIUM & SODIUM PHOSPHATES POWDER PACK 280-160-250 MG 250 MG: 280-160-250 PACK at 16:45

## 2021-10-06 RX ADMIN — POTASSIUM & SODIUM PHOSPHATES POWDER PACK 280-160-250 MG 250 MG: 280-160-250 PACK at 08:29

## 2021-10-06 RX ADMIN — Medication 10 ML: at 08:28

## 2021-10-06 RX ADMIN — Medication 10 ML: at 08:30

## 2021-10-06 ASSESSMENT — PAIN SCALES - GENERAL
PAINLEVEL_OUTOF10: 0

## 2021-10-06 ASSESSMENT — PAIN SCALES - WONG BAKER
WONGBAKER_NUMERICALRESPONSE: 0

## 2021-10-06 NOTE — PROGRESS NOTES
[facility] home care referral. Spoke with pt and re: home care plan of care/services. Agreeable. Demographic's verified. Aware of discharge with home care. Home care orders sent to [facility]. Discharge planner notified.

## 2021-10-06 NOTE — PLAN OF CARE
Problem: Falls - Risk of:  Goal: Will remain free from falls  Description: Will remain free from falls  10/6/2021 0832 by Padmini Molina RN  Outcome: Ongoing  10/5/2021 2105 by Tosha Adames RN  Outcome: Ongoing  Goal: Absence of physical injury  Description: Absence of physical injury  10/6/2021 0832 by Padmini Molina RN  Outcome: Ongoing  10/5/2021 2105 by Tosha Adames RN  Outcome: Ongoing     Problem: Skin Integrity:  Goal: Will show no infection signs and symptoms  Description: Will show no infection signs and symptoms  10/6/2021 0832 by Padmini Molina RN  Outcome: Ongoing  10/5/2021 2105 by Tosha Adames RN  Outcome: Ongoing  Goal: Absence of new skin breakdown  Description: Absence of new skin breakdown  10/6/2021 0832 by Padmini Molina RN  Outcome: Ongoing  10/5/2021 2105 by Tosha Adames RN  Outcome: Ongoing     Problem: Discharge Planning:  Goal: Participates in care planning  Description: Participates in care planning  10/6/2021 0832 by Padmini Molina RN  Outcome: Ongoing  10/5/2021 2105 by Tosha Adames RN  Outcome: Ongoing  Goal: Discharged to appropriate level of care  Description: Discharged to appropriate level of care  10/6/2021 0832 by Padmini Molina RN  Outcome: Ongoing  10/5/2021 2105 by Tosha Adames RN  Outcome: Ongoing     Problem: Activity Intolerance:  Goal: Ability to tolerate increased activity will improve  Description: Ability to tolerate increased activity will improve  10/6/2021 0832 by Padmini Molina RN  Outcome: Ongoing  10/5/2021 2105 by Tosha Adames RN  Outcome: Ongoing     Problem: Anxiety/Stress:  Goal: Level of anxiety will decrease  Description: Level of anxiety will decrease  10/6/2021 0832 by Padmini Molina RN  Outcome: Ongoing  10/5/2021 2105 by Tosha Adames RN  Outcome: Ongoing     Problem:  Bowel Function - Altered:  Goal: Bowel elimination is within specified parameters  Description: Bowel elimination is within specified parameters  10/6/2021 0832 by Malcom Flores RN  Outcome: Ongoing  10/5/2021 2105 by Freddie Mitchell RN  Outcome: Ongoing     Problem: Fluid Volume - Deficit:  Goal: Absence of fluid volume deficit signs and symptoms  Description: Absence of fluid volume deficit signs and symptoms  10/6/2021 0832 by Malcom Flores RN  Outcome: Ongoing  10/5/2021 2105 by Freddie Mitchell RN  Outcome: Ongoing  Goal: Electrolytes within specified parameters  Description: Electrolytes within specified parameters  10/6/2021 0832 by Malcom Flores RN  Outcome: Ongoing  10/5/2021 2105 by Freddie Mitchell RN  Outcome: Ongoing     Problem: Mental Status - Impaired:  Goal: Absence of physical injury  Description: Absence of physical injury  10/6/2021 0832 by Malcom Flores RN  Outcome: Ongoing  10/5/2021 2105 by Freddie Mitchell RN  Outcome: Ongoing  Goal: Absence of continued neurological deterioration signs and symptoms  Description: Absence of continued neurological deterioration signs and symptoms  10/6/2021 0832 by Malcom Flores RN  Outcome: Ongoing  10/5/2021 2105 by Freddie Mitchell RN  Outcome: Ongoing  Goal: Mental status will be restored to baseline  Description: Mental status will be restored to baseline  10/6/2021 0832 by Malcom Flores RN  Outcome: Ongoing  10/5/2021 2105 by Freddie Mitchell RN  Outcome: Ongoing     Problem: Mobility - Impaired:  Goal: Mobility will improve to maximum level  Description: Mobility will improve to maximum level  10/6/2021 0832 by Malcom Flores RN  Outcome: Ongoing  10/5/2021 2105 by Freddie Mitchell RN  Outcome: Ongoing     Problem: Nutrition Deficit:  Goal: Ability to achieve adequate nutritional intake will improve  Description: Ability to achieve adequate nutritional intake will improve  10/6/2021 0832 by Malcom Flores RN  Outcome: Ongoing  10/5/2021 2105 by Freddie Mitchell RN  Outcome: Ongoing     Problem: Pain:  Goal: Pain level will decrease  Description: Pain level will decrease  10/6/2021 0832 by Adrianna Davis RN  Outcome: Ongoing  10/5/2021 2105 by Fatemeh Crawford RN  Outcome: Ongoing  Goal: Ability to notify healthcare provider of pain before it becomes unmanageable or unbearable will improve  Description: Ability to notify healthcare provider of pain before it becomes unmanageable or unbearable will improve  10/6/2021 0832 by Adrianna Davis RN  Outcome: Ongoing  10/5/2021 2105 by Fatemeh Crawford RN  Outcome: Ongoing  Goal: Control of acute pain  Description: Control of acute pain  10/6/2021 0832 by Adrianna Davis RN  Outcome: Ongoing  10/5/2021 2105 by Fatemeh Crawford RN  Outcome: Ongoing  Goal: Control of chronic pain  Description: Control of chronic pain  10/6/2021 0832 by Adrianna Davis RN  Outcome: Ongoing  10/5/2021 2105 by Fatemeh Crawford RN  Outcome: Ongoing     Problem: Serum Glucose Level - Abnormal:  Goal: Ability to maintain appropriate glucose levels will improve to within specified parameters  Description: Ability to maintain appropriate glucose levels will improve to within specified parameters  10/6/2021 0832 by Adrianna Davis RN  Outcome: Ongoing  10/5/2021 2105 by Fatemeh Crawford RN  Outcome: Ongoing     Problem: Skin Integrity - Impaired:  Goal: Will show no infection signs and symptoms  Description: Will show no infection signs and symptoms  10/6/2021 0832 by Adrianna Davis RN  Outcome: Ongoing  10/5/2021 2105 by Fatemeh Crawford RN  Outcome: Ongoing  Goal: Absence of new skin breakdown  Description: Absence of new skin breakdown  10/6/2021 0832 by Adrianna Davis RN  Outcome: Ongoing  10/5/2021 2105 by Fatemeh Crawford RN  Outcome: Ongoing     Problem: Sleep Pattern Disturbance:  Goal: Appears well-rested  Description: Appears well-rested  10/6/2021 0832 by Adrianna Davis RN  Outcome: Ongoing  10/5/2021 2105 by Fatemeh Crawford RN  Outcome: Ongoing     Problem: Fluid Volume - Imbalance:  Goal: Will show no signs and symptoms of excessive bleeding  Description: Will show no signs and symptoms of excessive bleeding  10/6/2021 4289 by Gentry Casillas RN  Outcome: Ongoing  10/5/2021 2105 by Sunita Capps RN  Outcome: Ongoing  Goal: Absence of imbalanced fluid volume signs and symptoms  Description: Absence of imbalanced fluid volume signs and symptoms  10/6/2021 0832 by Gentry Casillas RN  Outcome: Ongoing  10/5/2021 2105 by Sunita Capps RN  Outcome: Ongoing     Problem: Nausea/Vomiting:  Goal: Ability to achieve adequate nutritional intake will improve  Description: Ability to achieve adequate nutritional intake will improve  10/6/2021 0832 by Gentry Casillas RN  Outcome: Ongoing  10/5/2021 2105 by Sunita Capps RN  Outcome: Ongoing  Goal: Absence of nausea/vomiting  Description: Absence of nausea/vomiting  10/6/2021 0832 by Gentry Casillas RN  Outcome: Ongoing  10/5/2021 2105 by Sunita Capps RN  Outcome: Ongoing  Goal: Able to drink  Description: Able to drink  10/6/2021 0399 by Gentry Casillas RN  Outcome: Ongoing  10/5/2021 2105 by Sunita Capps RN  Outcome: Ongoing  Goal: Able to eat  Description: Able to eat  10/6/2021 4127 by Gentry Casillas RN  Outcome: Ongoing  10/5/2021 2105 by Sunita Capps RN  Outcome: Ongoing

## 2021-10-06 NOTE — CARE COORDINATION
Discharge Note:    Pt is DC home with Davion [facility]. I gave the pt's dtr the flyer for [facility] to request addtional help if needed.     Jose Alejandro Zamora RN Case Manager

## 2021-10-06 NOTE — PROGRESS NOTES
Speech Language Pathology  Dysphagia Treatment Note    Name:  Zabrina Weller  :   1933  Medical Diagnosis:  Hematochezia [K92.1]  GI bleed [K92.2]  Acute lower GI hemorrhage [K92.2]  Treatment Diagnosis: Oropharyngeal Dysphagia  Pain level:  Pt denies pain at this time    Diet level prior to treatment: Full liquid diet (per GI)/thin liquids/meds crushed or whole with applesauce. Tolerance of Current Diet Level: Nurse reports good tolerance of current diet level with no overt signs of aspiration reported. Nurse also reports that occasional oral holding with thin liquids continues to be noted. Assessment of Texture Tolerance:  -Impressions: Pt positioned upright in chair, sleeping upon my arrival. When awakened, Pt agreeable to po trials to assess for texture tolerance. Pt demonstrates adequate bolus control and A-P propulsion with trials of thin liquids and purees. Only one episode of oral \"holding\" occurred over numerous thin liquid trials. No overt signs of aspiration noted with thin or puree textures. Pt currently remains on full liquid per GI directive. Will discharge from dysphagia treatment intervention at this time. Please reconsult if symptoms of dysphagia or aspiration noted with diet advance per GI. Diet and Treatment Recommendations 10/6/2021:  Full liquid diet with diet advance as indicated per GI    Dysphagia Goals:   1. Pt will functionally tolerate recommended diet with no overt clinical s/s of aspiration (Goal met 10/6/2021)   2.  Pt will demonstrate understanding of aspiration risk and precautions via education/demonstration with occasional prompting (Goal met 10/6/2021)     Patient/Family Education:Education given to the Pt and nurse, who verbalized understanding    Discharge Recommendations: No further dysphagia treatment indicated at this time; Please reconsult as indicated    Timed Code Treatment: 0 min    Total Treatment Time: 10 min    If patient discharges prior to next session this note will serve as a discharge summary.      Junior Maggi KAHN-ADO#[id number]

## 2021-10-06 NOTE — PROGRESS NOTES
Reassessment complete, see flowsheets. NSR on monitor. Pt incontinent of urine at this time-pt cleaned and gown/linens changed and repositioned for comfort. Bed in lowest position and alarm on. Call light within reach. Pt expressing no other needs at this time. Will continue to monitor.

## 2021-10-06 NOTE — PROGRESS NOTES
Initial shift assessment completed, see complex assessment in doc flowsheet. VSS, patient alert and oriented to person and situation, denies pain at this time. Meds given per MAR. Fall precautions in place, Call light within reach, encouraged to call for nurse as needed throughout the shift.  Will continue to monitor

## 2021-10-06 NOTE — PROGRESS NOTES
[facility] PROGRESS NOTE    10/6/2021 2:14 PM        Name: Sheila Liu . Admitted: 9/29/2021  Primary Care Provider: Ariana Walters MD (Tel: [phone])                        Subjective:  . No acute events overnight. Resting well. Pain control. Diet ok. Labs reviewed  Denies any chest pain sob.    Hemoglobin still trended down  Reviewed interval ancillary notes    Current Medications  0.9 % sodium chloride infusion, PRN  sodium chloride flush 0.9 % injection 5-40 mL, BID  potassium & sodium phosphates (PHOS-NAK) 280-160-250 MG packet 250 mg, 4x Daily  losartan (COZAAR) tablet 50 mg, Daily  amLODIPine (NORVASC) tablet 5 mg, Daily  ziprasidone (GEODON) injection 10 mg, Q6H PRN  melatonin tablet 10.5 mg, Nightly PRN  metoprolol succinate (TOPROL XL) extended release tablet 50 mg, Daily  sodium chloride flush 0.9 % injection 5-40 mL, 2 times per day  sodium chloride flush 0.9 % injection 5-40 mL, PRN  0.9 % sodium chloride infusion, PRN  ondansetron (ZOFRAN-ODT) disintegrating tablet 4 mg, Q8H PRN   Or  ondansetron (ZOFRAN) injection 4 mg, Q6H PRN  polyethylene glycol (GLYCOLAX) packet 17 g, Daily PRN  acetaminophen (TYLENOL) tablet 650 mg, Q6H PRN   Or  acetaminophen (TYLENOL) suppository 650 mg, Q6H PRN  pantoprazole (PROTONIX) tablet 40 mg, QAM AC  potassium chloride 20 mEq/50 mL IVPB (Central Line), PRN  sodium phosphate 8.85 mmol in dextrose 5 % 250 mL IVPB, PRN   Or  sodium phosphate 17.7 mmol in dextrose 5 % 250 mL IVPB, PRN  magnesium sulfate 1000 mg in dextrose 5% 100 mL IVPB, PRN        Objective:  BP (!) 144/53   Pulse 63   Temp 98.4 °F (36.9 °C) (Temporal)   Resp 14   Ht 5' 4\" (1.626 m)   Wt 125 lb 14.1 oz (57.1 kg)   SpO2 96%   BMI 21.61 kg/m²     Intake/Output Summary (Last 24 hours) at 10/6/2021 1414  Last data filed at 10/6/2021 1405  Gross per 24 hour Intake 180 ml   Output    Net 180 ml      Wt Readings from Last 3 Encounters:   10/06/21 125 lb 14.1 oz (57.1 kg)   04/12/21 111 lb (50.3 kg)   02/23/21 111 lb (50.3 kg)       General appearance:  Appears comfortable  Eyes: Sclera clear. Pupils equal.  ENT: Moist oral mucosa. Trachea midline, no adenopathy. Cardiovascular: Regular rhythm, normal S1, S2. No murmur. No edema in lower extremities  Respiratory: Not using accessory muscles. Good inspiratory effort. Clear to auscultation bilaterally, no wheeze or crackles. GI: Abdomen soft, no tenderness, not distended, normal bowel sounds  Musculoskeletal: No cyanosis in digits, neck supple  Neurology: CN 2-12 grossly intact. No speech or motor deficits  Psych: Normal affect. Alert and oriented in time, place and person  Skin: Warm, dry, normal turgor    Labs and Tests:  CBC:   Recent Labs     10/05/21  1035 10/05/21  2049 10/06/21  0236   HGB 7.4* 7.2* 7.3*     BMP:    Recent Labs     10/04/21  0520 10/06/21  0236    143   K 4.0 4.0   * 113*   CO2 25 23   BUN 12 8   CREATININE 0.6 0.6   GLUCOSE 99 96     Hepatic:   Recent Labs     10/04/21  0520   AST 31   ALT 16   BILITOT 0.3   ALKPHOS 41       Discussed care with family and patient             Spent 30  minutes with patient and family at bedside and on unit reviewing medical records and labs, spent greater than 50% time counseling patient and family on diagnosis and plan   Problem List  Active Problems:    Hypertension    Acute lower GI hemorrhage    Diverticulosis of colon with hemorrhage of large intestine    Acute blood loss anemia  Resolved Problems:    * No resolved hospital problems. *       Assessment & Plan:   1.  Acute GI bleed  -Patient this is likely secondary diverticular  CTA negative-x3  Patient underwent EGD normal colonoscopy just pandiverticulosis no active bleeding  -Patient high risk for repeat colonoscopy  -Received 5 units of RBCs since admission last transfusion yesterday  -Hemoglobin is trended down slightly to 7.3  -Discussed with GI. Given drop in hemoglobin though will hold off another day plan for discharge tomorrow if hemoglobin is about the same    Hypotension resolved            Diet: ADULT DIET;  Full Liquid  Code:Full Code  DVT PPX lovenox       Meli Quinn MD   10/6/2021 2:14 PM

## 2021-10-06 NOTE — CARE COORDINATION
Fina received a referral to this patient for a rolling walker. Rolling walker has been delivered to the patients room. Thank you for the referral.  Electronically signed by Dion Hart on 10/6/2021 at 2:00 PM  Cell ph# [phone]    NOTE: After 5:00 pm, Weekends, Holidays: Call [facility] at [phone] to coordinate delivery of home medical equipment.

## 2021-10-06 NOTE — PLAN OF CARE
Problem: Falls - Risk of:  Goal: Will remain free from falls  Description: Will remain free from falls  10/5/2021 2105 by Raynard Lefort, RN  Outcome: Ongoing  10/5/2021 1137 by Arabella Alejo RN  Outcome: Ongoing   All fall risk precautions in place. Bed in lowest position and alarm on. Call light within reach.

## 2021-10-06 NOTE — PROGRESS NOTES
Pt assessment complete, see flowsheets. Medications given, see MAR. NSR on monitor. Pt alert and oriented to person and place and following commands. Pt very soft spoken. Room air. Full liquid diet. Pt has R femoral CVC, LAC, and RAC access, see flowsheets. Pt to commode and returned to bed with walker. Pt repositioned for comfort. Bed in lowest position and alarm on. Call light within reach. Pt expressing no other needs at this time. Labs collected and sent, awaiting results. Will continue to monitor.

## 2021-10-06 NOTE — PROGRESS NOTES
Reassessment complete, see flowsheets. NSR on monitor. Pt incontinent of urine at this time-pt bathed and gown/linens changed and repositioned for comfort. Bed in lowest position and alarm on. Call light within reach. Pt expressing no other needs at this time. Will continue to monitor.

## 2021-10-06 NOTE — PROGRESS NOTES
Gastroenterology Progress Note            Shahana Bolton is a 80 y.o. female patient. 1. Hematochezia    2. Gastrointestinal hemorrhage associated with intestinal diverticulosis    3. Essential hypertension        SUBJECTIVE:  No further obvious GI bleeding. Denies ab pain. No cp or sob. Physical    VITALS:  BP (!) 128/55   Pulse 71   Temp 98 °F (36.7 °C) (Temporal)   Resp 12   Ht 5' 4\" (1.626 m)   Wt 125 lb 14.1 oz (57.1 kg)   SpO2 98%   BMI 21.61 kg/m²   TEMPERATURE:  Current - Temp: 98 °F (36.7 °C); Max - Temp  Av.2 °F (36.8 °C)  Min: 97.3 °F (36.3 °C)  Max: 98.5 °F (36.9 °C)    Abdomen soft, ND, NT, no HSM, Bowel sounds normal   Awake and alert  nad  eomi    Data      Recent Labs     10/05/21  1035 10/05/21  2049 10/06/21  0236   HGB 7.4* 7.2* 7.3*   HCT 22.2* 21.4* 21.5*     Recent Labs     10/04/21  0520 10/06/21  0236    143   K 4.0 4.0   * 113*   CO2 25 23   PHOS 2.7 3.8   BUN 12 8   CREATININE 0.6 0.6     Recent Labs     10/04/21  0520   AST 31   ALT 16   BILITOT 0.3   ALKPHOS 41     No results for input(s): LIPASE, AMYLASE in the last 72 hours. ASSESSMENT   1. Hematochezia-    Likely diverticular bleeding but unable to locate source due to colonoscopy interrupted for profound  bradycardia during procedure. Now with recurrent episode  night. Tagged RBC Monday was neg. Passed red and black blood per rectum last night which was likely old blood. 2. Acute blood loss anemia- hgb incremented more than expected from 6.6 to 8.5 with 1 u prbc Monday. hgb 7.8 on repeat and is 7.4 10/5 which is near where she should be after 1 u prbc. hgb stable 10/6        PLAN    1. Cont to follow hgb  2. Advance to full liquids   3. Can D/C zully if H&H stable and no further GI bleeding. Discussed with Dr. Horowitz Ee, [address] [place] [address]      I have personally performed a face to face diagnostic evaluation on this patient.   I have interviewed and examined the patient and I agree with the findings and recommended plan of care. In summary, my findings and plan are the following:  No bleeding.  hgb stable. Ab nt. Will watch one more day. Consider DC home tomorrow if no further bleeding.         Mariam Cortez MD  [address]

## 2021-10-06 NOTE — CARE COORDINATION
Fina received a referral to this patient for a rolling walker. Rolling walker has been delivered to the patients room. Thank you for the referral.  Electronically signed by Emaline Heads on 10/6/2021 at 12:05 PM  Cell ph# [phone]    NOTE: After 5:00 pm, Weekends, Holidays: Call [facility] at [phone] to coordinate delivery of home medical equipment.

## 2021-10-06 NOTE — PROGRESS NOTES
[facility] hospitalist:    \" Patient admitted for Bleeding. Patient just had a huge brown-red BM. patient very sleepy and lethargic. /60 Map 82 HR 78 O2 sat 98%. Last [facility] 7.3 Do you want to order a new [facility]? \"

## 2021-10-07 VITALS
BODY MASS INDEX: 21.54 KG/M2 | HEIGHT: 64 IN | RESPIRATION RATE: 14 BRPM | OXYGEN SATURATION: 100 % | HEART RATE: 71 BPM | TEMPERATURE: 97.8 F | SYSTOLIC BLOOD PRESSURE: 130 MMHG | WEIGHT: 126.2 LBS | DIASTOLIC BLOOD PRESSURE: 47 MMHG

## 2021-10-07 LAB
BASOPHILS ABSOLUTE: 0 K/UL (ref 0–0.2)
BASOPHILS RELATIVE PERCENT: 0.2 %
EOSINOPHILS ABSOLUTE: 0.3 K/UL (ref 0–0.6)
EOSINOPHILS RELATIVE PERCENT: 2.9 %
HCT VFR BLD CALC: 22.8 % (ref 36–48)
HEMOGLOBIN: 7.7 G/DL (ref 12–16)
LYMPHOCYTES ABSOLUTE: 1.2 K/UL (ref 1–5.1)
LYMPHOCYTES RELATIVE PERCENT: 13.6 %
MCH RBC QN AUTO: 30.3 PG (ref 26–34)
MCHC RBC AUTO-ENTMCNC: 33.8 G/DL (ref 31–36)
MCV RBC AUTO: 89.6 FL (ref 80–100)
MONOCYTES ABSOLUTE: 0.8 K/UL (ref 0–1.3)
MONOCYTES RELATIVE PERCENT: 8.7 %
NEUTROPHILS ABSOLUTE: 6.6 K/UL (ref 1.7–7.7)
NEUTROPHILS RELATIVE PERCENT: 74.6 %
PDW BLD-RTO: 17 % (ref 12.4–15.4)
PLATELET # BLD: 170 K/UL (ref 135–450)
PMV BLD AUTO: 8.5 FL (ref 5–10.5)
RBC # BLD: 2.55 M/UL (ref 4–5.2)
WBC # BLD: 8.9 K/UL (ref 4–11)

## 2021-10-07 PROCEDURE — 6370000000 HC RX 637 (ALT 250 FOR IP): Performed by: HOSPITALIST

## 2021-10-07 PROCEDURE — 6370000000 HC RX 637 (ALT 250 FOR IP): Performed by: INTERNAL MEDICINE

## 2021-10-07 PROCEDURE — 85025 COMPLETE CBC W/AUTO DIFF WBC: CPT

## 2021-10-07 PROCEDURE — 2580000003 HC RX 258: Performed by: HOSPITALIST

## 2021-10-07 PROCEDURE — 2580000003 HC RX 258: Performed by: PHYSICIAN ASSISTANT

## 2021-10-07 RX ADMIN — PANTOPRAZOLE SODIUM 40 MG: 40 TABLET, DELAYED RELEASE ORAL at 06:26

## 2021-10-07 RX ADMIN — Medication 10 ML: at 08:47

## 2021-10-07 RX ADMIN — ACETAMINOPHEN 650 MG: 325 TABLET ORAL at 04:43

## 2021-10-07 RX ADMIN — AMLODIPINE BESYLATE 5 MG: 5 TABLET ORAL at 08:46

## 2021-10-07 RX ADMIN — POTASSIUM & SODIUM PHOSPHATES POWDER PACK 280-160-250 MG 250 MG: 280-160-250 PACK at 08:46

## 2021-10-07 RX ADMIN — LOSARTAN POTASSIUM 50 MG: 25 TABLET, FILM COATED ORAL at 08:46

## 2021-10-07 RX ADMIN — METOPROLOL SUCCINATE 50 MG: 50 TABLET, EXTENDED RELEASE ORAL at 08:46

## 2021-10-07 ASSESSMENT — PAIN SCALES - GENERAL
PAINLEVEL_OUTOF10: 0
PAINLEVEL_OUTOF10: 3

## 2021-10-07 ASSESSMENT — PAIN SCALES - WONG BAKER
WONGBAKER_NUMERICALRESPONSE: 0

## 2021-10-07 NOTE — PLAN OF CARE
Problem: Falls - Risk of:  Goal: Will remain free from falls  Description: Will remain free from falls  10/7/2021 0905 by Tatum Sullivan RN  Outcome: Ongoing  10/7/2021 0121 by Zeus Cheema RN  Outcome: Ongoing  Goal: Absence of physical injury  Description: Absence of physical injury  10/7/2021 0905 by Tatum Sullivan RN  Outcome: Ongoing  10/7/2021 0121 by Zeus Cheema RN  Outcome: Ongoing     Problem: Skin Integrity:  Goal: Will show no infection signs and symptoms  Description: Will show no infection signs and symptoms  10/7/2021 0905 by Tatum Sullivan RN  Outcome: Ongoing  10/7/2021 0121 by Zeus Cheema RN  Outcome: Ongoing  Goal: Absence of new skin breakdown  Description: Absence of new skin breakdown  10/7/2021 0905 by Tatum Sullivan RN  Outcome: Ongoing  10/7/2021 0121 by Zeus Cheema RN  Outcome: Ongoing     Problem: Discharge Planning:  Goal: Participates in care planning  Description: Participates in care planning  10/7/2021 0905 by Tatum Sullivan RN  Outcome: Ongoing  10/7/2021 0121 by Zeus Cheema RN  Outcome: Ongoing  Goal: Discharged to appropriate level of care  Description: Discharged to appropriate level of care  10/7/2021 0905 by Tatum Sullivan RN  Outcome: Ongoing  10/7/2021 0121 by Zeus Cheema RN  Outcome: Ongoing     Problem: Activity Intolerance:  Goal: Ability to tolerate increased activity will improve  Description: Ability to tolerate increased activity will improve  10/7/2021 0905 by Tatum Sullivan RN  Outcome: Ongoing  10/7/2021 0121 by Zeus Cheema RN  Outcome: Ongoing     Problem: Anxiety/Stress:  Goal: Level of anxiety will decrease  Description: Level of anxiety will decrease  10/7/2021 0905 by Tatum Sullivan RN  Outcome: Ongoing  10/7/2021 0121 by Zeus Cheema RN  Outcome: Met This Shift     Problem:  Bowel Function - Altered:  Goal: Bowel elimination is within specified parameters  Description: Bowel elimination is within specified parameters  10/7/2021 0905 by Mayelin Correa RN  Outcome: Ongoing  10/7/2021 0121 by Virgia Ahumada, RN  Outcome: Met This Shift     Problem: Fluid Volume - Deficit:  Goal: Absence of fluid volume deficit signs and symptoms  Description: Absence of fluid volume deficit signs and symptoms  10/7/2021 0905 by Mayelin Correa RN  Outcome: Ongoing  10/7/2021 0121 by Virgia Ahumada, RN  Outcome: Ongoing  Goal: Electrolytes within specified parameters  Description: Electrolytes within specified parameters  10/7/2021 0905 by Mayelin Correa RN  Outcome: Ongoing  10/7/2021 0121 by Virgia Ahumada, RN  Outcome: Ongoing     Problem: Mental Status - Impaired:  Goal: Absence of physical injury  Description: Absence of physical injury  10/7/2021 0905 by Mayelin Correa RN  Outcome: Ongoing  10/7/2021 0121 by Virgia Ahumada, RN  Outcome: Ongoing  Goal: Absence of continued neurological deterioration signs and symptoms  Description: Absence of continued neurological deterioration signs and symptoms  10/7/2021 0905 by Mayelin Correa RN  Outcome: Ongoing  10/7/2021 0121 by Virgia Ahumada, RN  Outcome: Ongoing  Goal: Mental status will be restored to baseline  Description: Mental status will be restored to baseline  10/7/2021 0905 by Mayelin Correa RN  Outcome: Ongoing  10/7/2021 0121 by Virgia Ahumada, RN  Outcome: Ongoing     Problem: Mobility - Impaired:  Goal: Mobility will improve to maximum level  Description: Mobility will improve to maximum level  10/7/2021 0905 by Mayelin Correa RN  Outcome: Ongoing  10/7/2021 0121 by Virgia Ahumada, RN  Outcome: Ongoing     Problem: Nutrition Deficit:  Goal: Ability to achieve adequate nutritional intake will improve  Description: Ability to achieve adequate nutritional intake will improve  10/7/2021 0905 by Mayelin Correa RN  Outcome: Ongoing  10/7/2021 0121 by Virgia Ahumada, RN  Outcome: Ongoing     Problem: Pain:  Goal: Pain level will decrease  Description: Pain level will decrease  10/7/2021 0905 by Lewie Olszewski, RN  Outcome: Ongoing  10/7/2021 0121 by Roger Roque RN  Outcome: Met This Shift  Goal: Ability to notify healthcare provider of pain before it becomes unmanageable or unbearable will improve  Description: Ability to notify healthcare provider of pain before it becomes unmanageable or unbearable will improve  10/7/2021 0905 by Lewie Olszewski, RN  Outcome: Ongoing  10/7/2021 0121 by Roger Roque RN  Outcome: Met This Shift  Goal: Control of acute pain  Description: Control of acute pain  10/7/2021 0905 by Lewie Olszewski, RN  Outcome: Ongoing  10/7/2021 0121 by Roger Roque RN  Outcome: Met This Shift  Goal: Control of chronic pain  Description: Control of chronic pain  10/7/2021 0905 by Lewie Olszewski, RN  Outcome: Ongoing  10/7/2021 0121 by Roger Roque RN  Outcome: Met This Shift     Problem: Serum Glucose Level - Abnormal:  Goal: Ability to maintain appropriate glucose levels will improve to within specified parameters  Description: Ability to maintain appropriate glucose levels will improve to within specified parameters  Outcome: Ongoing     Problem: Skin Integrity - Impaired:  Goal: Will show no infection signs and symptoms  Description: Will show no infection signs and symptoms  10/7/2021 0905 by Lewie Olszewski, RN  Outcome: Ongoing  10/7/2021 0121 by Roger Roque RN  Outcome: Ongoing  Goal: Absence of new skin breakdown  Description: Absence of new skin breakdown  10/7/2021 0905 by Lewie Olszewski, RN  Outcome: Ongoing  10/7/2021 0121 by Roger Roque RN  Outcome: Ongoing     Problem: Sleep Pattern Disturbance:  Goal: Appears well-rested  Description: Appears well-rested  10/7/2021 0905 by Lewie Olszewski, RN  Outcome: Ongoing  10/7/2021 0121 by Roger Roque RN  Outcome: Ongoing     Problem: Fluid Volume - Imbalance:  Goal: Will show no signs and symptoms of excessive bleeding  Description: Will show no signs and symptoms of excessive bleeding  Outcome: Ongoing  Goal: Absence of imbalanced fluid volume signs and symptoms  Description: Absence of imbalanced fluid volume signs and symptoms  Outcome: Ongoing     Problem: Nausea/Vomiting:  Goal: Ability to achieve adequate nutritional intake will improve  Description: Ability to achieve adequate nutritional intake will improve  10/7/2021 0905 by Gurdeep Seals RN  Outcome: Ongoing  10/7/2021 0121 by Lilia Varela RN  Outcome: Ongoing  Goal: Absence of nausea/vomiting  Description: Absence of nausea/vomiting  10/7/2021 0905 by Gurdeep Seals RN  Outcome: Ongoing  10/7/2021 0121 by Lilia Varela RN  Outcome: Ongoing  Goal: Able to drink  Description: Able to drink  10/7/2021 0905 by Gurdeep Seals RN  Outcome: Ongoing  10/7/2021 0121 by Lilia Varela RN  Outcome: Ongoing  Goal: Able to eat  Description: Able to eat  10/7/2021 0905 by Gurdeep Seals RN  Outcome: Ongoing  10/7/2021 0121 by Lilia Varela RN  Outcome: Ongoing

## 2021-10-07 NOTE — PROGRESS NOTES
IV access and tele removed, no complications. Discharge instructions given and explained to patient and daughter.  Patient left home with all personal belongs and home care

## 2021-10-07 NOTE — PLAN OF CARE
Problem: Falls - Risk of:  Goal: Will remain free from falls  Description: Will remain free from falls  10/7/2021 0905 by Malcom Flores RN  Outcome: Ongoing  10/7/2021 0121 by Moira Rondon RN  Outcome: Ongoing  Goal: Absence of physical injury  Description: Absence of physical injury  10/7/2021 0905 by Malcom Flores RN  Outcome: Ongoing  10/7/2021 0121 by Moira Rondon RN  Outcome: Ongoing     Problem: Skin Integrity:  Goal: Will show no infection signs and symptoms  Description: Will show no infection signs and symptoms  10/7/2021 0905 by Malcom Flores RN  Outcome: Ongoing  10/7/2021 0121 by Moira Rondon RN  Outcome: Ongoing  Goal: Absence of new skin breakdown  Description: Absence of new skin breakdown  10/7/2021 0905 by Malcom Flores RN  Outcome: Ongoing  10/7/2021 0121 by Moira Rondon RN  Outcome: Ongoing     Problem: Discharge Planning:  Goal: Participates in care planning  Description: Participates in care planning  10/7/2021 0905 by Malcom Flores RN  Outcome: Ongoing  10/7/2021 0121 by Moira Rondon RN  Outcome: Ongoing  Goal: Discharged to appropriate level of care  Description: Discharged to appropriate level of care  10/7/2021 0905 by Malcom Flores RN  Outcome: Ongoing  10/7/2021 0121 by Moira Rondon RN  Outcome: Ongoing     Problem: Activity Intolerance:  Goal: Ability to tolerate increased activity will improve  Description: Ability to tolerate increased activity will improve  10/7/2021 0905 by Malcom Flores RN  Outcome: Ongoing  10/7/2021 0121 by Moira Rondon RN  Outcome: Ongoing     Problem: Anxiety/Stress:  Goal: Level of anxiety will decrease  Description: Level of anxiety will decrease  10/7/2021 0905 by Malcom Flores RN  Outcome: Ongoing  10/7/2021 0121 by Moira Rondon RN  Outcome: Met This Shift     Problem:  Bowel Function - Altered:  Goal: Bowel elimination is within specified parameters  Description: Bowel elimination is within specified parameters  10/7/2021 0905 by Alison Cazares RN  Outcome: Ongoing  10/7/2021 0121 by Vicky Hickman RN  Outcome: Met This Shift     Problem: Fluid Volume - Deficit:  Goal: Absence of fluid volume deficit signs and symptoms  Description: Absence of fluid volume deficit signs and symptoms  10/7/2021 0905 by Alison Cazares RN  Outcome: Ongoing  10/7/2021 0121 by Vicky Hickman RN  Outcome: Ongoing  Goal: Electrolytes within specified parameters  Description: Electrolytes within specified parameters  10/7/2021 0905 by Alison Cazares RN  Outcome: Ongoing  10/7/2021 0121 by Vicky Hickman RN  Outcome: Ongoing     Problem: Mental Status - Impaired:  Goal: Absence of physical injury  Description: Absence of physical injury  10/7/2021 0905 by Alison Cazares RN  Outcome: Ongoing  10/7/2021 0121 by Vicky Hickman RN  Outcome: Ongoing  Goal: Absence of continued neurological deterioration signs and symptoms  Description: Absence of continued neurological deterioration signs and symptoms  10/7/2021 0905 by Alison Cazares RN  Outcome: Ongoing  10/7/2021 0121 by Vicky Hickman RN  Outcome: Ongoing  Goal: Mental status will be restored to baseline  Description: Mental status will be restored to baseline  10/7/2021 0905 by Alison Cazares RN  Outcome: Ongoing  10/7/2021 0121 by Vicky Hickman RN  Outcome: Ongoing     Problem: Mobility - Impaired:  Goal: Mobility will improve to maximum level  Description: Mobility will improve to maximum level  10/7/2021 0905 by Alison Cazares RN  Outcome: Ongoing  10/7/2021 0121 by Vicky Hickman RN  Outcome: Ongoing     Problem: Nutrition Deficit:  Goal: Ability to achieve adequate nutritional intake will improve  Description: Ability to achieve adequate nutritional intake will improve  10/7/2021 0905 by Alison Cazares RN  Outcome: Ongoing  10/7/2021 0121 by Vicky Hickman RN  Outcome: Ongoing     Problem: Pain:  Goal: Pain level will decrease  Description: Pain level will decrease  10/7/2021 0905 by Thalia Oneal RN  Outcome: Ongoing  10/7/2021 0121 by Yong Brewer RN  Outcome: Met This Shift  Goal: Ability to notify healthcare provider of pain before it becomes unmanageable or unbearable will improve  Description: Ability to notify healthcare provider of pain before it becomes unmanageable or unbearable will improve  10/7/2021 0905 by Thalia Oneal RN  Outcome: Ongoing  10/7/2021 0121 by Yong Brewer RN  Outcome: Met This Shift  Goal: Control of acute pain  Description: Control of acute pain  10/7/2021 0905 by Thalia Oneal RN  Outcome: Ongoing  10/7/2021 0121 by Yong Brewer RN  Outcome: Met This Shift  Goal: Control of chronic pain  Description: Control of chronic pain  10/7/2021 0905 by Thalia Oneal RN  Outcome: Ongoing  10/7/2021 0121 by Yong Brewer RN  Outcome: Met This Shift     Problem: Serum Glucose Level - Abnormal:  Goal: Ability to maintain appropriate glucose levels will improve to within specified parameters  Description: Ability to maintain appropriate glucose levels will improve to within specified parameters  Outcome: Ongoing     Problem: Skin Integrity - Impaired:  Goal: Will show no infection signs and symptoms  Description: Will show no infection signs and symptoms  10/7/2021 0905 by Thalia Oneal RN  Outcome: Ongoing  10/7/2021 0121 by Yong Brewer RN  Outcome: Ongoing  Goal: Absence of new skin breakdown  Description: Absence of new skin breakdown  10/7/2021 0905 by Thalia Oneal RN  Outcome: Ongoing  10/7/2021 0121 by Yong Brewer RN  Outcome: Ongoing     Problem: Sleep Pattern Disturbance:  Goal: Appears well-rested  Description: Appears well-rested  10/7/2021 0905 by Thalia Oneal RN  Outcome: Ongoing  10/7/2021 0121 by Yong Brewer RN  Outcome: Ongoing     Problem: Fluid Volume - Imbalance:  Goal: Will show no signs and symptoms of excessive bleeding  Description: Will show no signs and symptoms of excessive bleeding  Outcome: Ongoing  Goal: Absence of imbalanced fluid volume signs and symptoms  Description: Absence of imbalanced fluid volume signs and symptoms  Outcome: Ongoing     Problem: Nausea/Vomiting:  Goal: Ability to achieve adequate nutritional intake will improve  Description: Ability to achieve adequate nutritional intake will improve  10/7/2021 0905 by Som Urias RN  Outcome: Ongoing  10/7/2021 0121 by Rohit Orellana RN  Outcome: Ongoing  Goal: Absence of nausea/vomiting  Description: Absence of nausea/vomiting  10/7/2021 0905 by Som Urias RN  Outcome: Ongoing  10/7/2021 0121 by Rohit Orellana RN  Outcome: Ongoing  Goal: Able to drink  Description: Able to drink  10/7/2021 0905 by Som Urias RN  Outcome: Ongoing  10/7/2021 0121 by Rohit Orellana RN  Outcome: Ongoing  Goal: Able to eat  Description: Able to eat  10/7/2021 0905 by Som Urias RN  Outcome: Ongoing  10/7/2021 0121 by Rohit Orellana RN  Outcome: Ongoing

## 2021-10-07 NOTE — PROGRESS NOTES
Gastroenterology Progress Note            Trevorthanh Matt is a 80 y.o. female patient. 1. Hematochezia    2. Gastrointestinal hemorrhage associated with intestinal diverticulosis    3. 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 Denies ab pain. No cp or sob. Physical    VITALS:  BP (!) 145/59   Pulse 78   Temp 97.8 °F (36.6 °C) (Temporal)   Resp 23   Ht 5' 4\" (1.626 m)   Wt 126 lb 3.2 oz (57.2 kg)   SpO2 100%   BMI 21.66 kg/m²   TEMPERATURE:  Current - Temp: 97.8 °F (36.6 °C); Max - Temp  Av.6 °F (37 °C)  Min: 97.8 °F (36.6 °C)  Max: 99.1 °F (37.3 °C)    Abdomen soft, ND, NT, no HSM, Bowel sounds normal   Awake and alert  nad  eomi    Data      Recent Labs     10/05/21  2049 10/05/21  2049 10/06/21  0236 10/06/21  1850 10/07/21  0705   WBC  --   --   --   --  8.9   HGB 7.2*   < > 7.3* 8.3* 7.7*   HCT 21.4*  --  21.5*  --  22.8*   MCV  --   --   --   --  89.6   PLT  --   --   --   --  170    < > = values in this interval not displayed. Recent Labs     10/06/21  0236      K 4.0   *   CO2 23   PHOS 3.8   BUN 8   CREATININE 0.6     No results for input(s): AST, ALT, ALB, BILIDIR, BILITOT, ALKPHOS in the last 72 hours. No results for input(s): LIPASE, AMYLASE in the last 72 hours. ASSESSMENT   1. Hematochezia-    Likely diverticular bleeding but unable to locate source due to colonoscopy interrupted for profound  bradycardia during procedure. Now with recurrent episode  night. Tagged RBC Monday was neg. Now passing brown stool  2. Acute blood loss anemia- hgb incremented more than expected from 6.6 to 8.5 with 1 u prbc Monday. hgb 7.8 on repeat and stable since.        PLAN    1. Advance diet  2. [id number] for d/c from GI standpoint     Discussed with Dr. Lissette Moe PA-C  [facility]    I have personally performed a face to face diagnostic evaluation on this patient.   I have interviewed and examined the patient and I agree with the findings and recommended plan of care. In summary, my findings and plan are the following:  No bleeding, ,hgb stable ab nt. Mel Delgado for [facility]. D/w pt to return if recurrent bleeding  Then will need tagged cell scan.        Mariam Cortez MD  [address] [facility] [address]

## 2021-10-07 NOTE — PLAN OF CARE
Problem: Falls - Risk of:  Goal: Will remain free from falls  Description: Will remain free from falls  Outcome: Ongoing  Goal: Absence of physical injury  Description: Absence of physical injury  Outcome: Ongoing     Problem: Skin Integrity:  Goal: Will show no infection signs and symptoms  Description: Will show no infection signs and symptoms  Outcome: Ongoing  Goal: Absence of new skin breakdown  Description: Absence of new skin breakdown  Outcome: Ongoing     Problem: Discharge Planning:  Goal: Participates in care planning  Description: Participates in care planning  Outcome: Ongoing  Goal: Discharged to appropriate level of care  Description: Discharged to appropriate level of care  Outcome: Ongoing     Problem: Fluid Volume - Deficit:  Goal: Absence of fluid volume deficit signs and symptoms  Description: Absence of fluid volume deficit signs and symptoms  Outcome: Ongoing  Goal: Electrolytes within specified parameters  Description: Electrolytes within specified parameters  Outcome: Ongoing     Problem:  Bowel Function - Altered:  Goal: Bowel elimination is within specified parameters  Description: Bowel elimination is within specified parameters  Outcome: Met This Shift     Problem: Mental Status - Impaired:  Goal: Absence of physical injury  Description: Absence of physical injury  Outcome: Ongoing  Goal: Absence of continued neurological deterioration signs and symptoms  Description: Absence of continued neurological deterioration signs and symptoms  Outcome: Ongoing  Goal: Mental status will be restored to baseline  Description: Mental status will be restored to baseline  Outcome: Ongoing     Problem: Nutrition Deficit:  Goal: Ability to achieve adequate nutritional intake will improve  Description: Ability to achieve adequate nutritional intake will improve  Outcome: Ongoing     Problem: Mobility - Impaired:  Goal: Mobility will improve to maximum level  Description: Mobility will improve to maximum level  Outcome: Ongoing     Problem: Skin Integrity - Impaired:  Goal: Will show no infection signs and symptoms  Description: Will show no infection signs and symptoms  Outcome: Ongoing  Goal: Absence of new skin breakdown  Description: Absence of new skin breakdown  Outcome: Ongoing     Problem: Sleep Pattern Disturbance:  Goal: Appears well-rested  Description: Appears well-rested  Outcome: Ongoing     Problem: Nausea/Vomiting:  Goal: Ability to achieve adequate nutritional intake will improve  Description: Ability to achieve adequate nutritional intake will improve  Outcome: Ongoing  Goal: Absence of nausea/vomiting  Description: Absence of nausea/vomiting  Outcome: Ongoing  Goal: Able to drink  Description: Able to drink  Outcome: Ongoing  Goal: Able to eat  Description: Able to eat  Outcome: Ongoing

## 2021-10-08 ENCOUNTER — CARE COORDINATION (OUTPATIENT)
Dept: CASE MANAGEMENT | Age: 86
End: 2021-10-08

## 2021-10-08 NOTE — CARE COORDINATION
[address] Transitions Initial Follow Up Call    Call within 2 business days of discharge: Yes    Patient: Haley Morales Patient : 1933   MRN: [id number]  Reason for Admission:   Discharge Date: 10/7/21 RARS: Readmission Risk Score: 20      Last Discharge [address] [address]       Complaint Diagnosis Description Type Department Provider    21 GI Bleeding Hematochezia . .. ED to Hosp-Admission (Discharged) (ADMITTED) F ICU Benita Serrano MD; Ibrahima Olmos. .. SNon-face-to-face services provided:  Obtained and reviewed discharge summary and/or continuity of care documents     Transitions of Care Initial Call    Was this an external facility discharge? No Discharge Facility:     Challenges to be reviewed by the provider   Additional needs identified to be addressed with provider: No  none             Method of communication with provider : none      Advance Care Planning:   Does patient have an Advance Directive: not on file; education provided. Was this a readmission? No  Patient stated reason for admission: bloody stool  Patients top risk factors for readmission: medical condition-bloody stool    Care Transition Nurse (CTN) contacted the family by telephone to perform post hospital discharge assessment. Verified name and  with family as identifiers. Provided introduction to self, and explanation of the CTN role. CTN reviewed discharge instructions, medical action plan and red flags with family who verbalized understanding. Family given an opportunity to ask questions and does not have any further questions or concerns at this time. Were discharge instructions available to patient? Yes. Reviewed appropriate site of care based on symptoms and resources available to patient including: When to call 911. The family agrees to contact the PCP office for questions related to their healthcare.      Medication reconciliation was performed with family, who verbalizes understanding of administration of home medications. Advised obtaining a 90-day supply of all daily and as-needed medications. Reviewed and educated family on any new and changed medications related to discharge diagnosis. Was patient discharged with a pulse oximeter? No Discussed and confirmed pulse oximeter discharge instructions and when to notify provider or seek emergency care. Pt's daughter(HIPAA verified) states pt is doing well, no issues or concerns. Denies any further blood in her stool. Pt does not have a [facility] PCP therefore no further CTN f/u calls will be made however CTN provided contact information for future needs. Care Transitions 24 Hour Call    Do you have any ongoing symptoms?: No  Do you have a copy of your discharge instructions?: Yes  Do you have all of your prescriptions and are they filled?: Yes  Have you been contacted by a [address]?: No  Have you scheduled your follow up appointment?: No  Were you discharged with any Home Care or Post Acute Services: Yes  Post Acute Services: Home Health (Comment: [facility])  Do you feel like you have everything you need to keep you well at home?: Yes  Care Transitions Interventions  No Identified Needs         Follow Up  No future appointments.     Isabella Waterman, RN

## 2021-10-10 NOTE — DISCHARGE SUMMARY
[address] [place] [address] DISCHARGE SUMMARY    Patient Demographics    Patient. [place]  Date of Birth. 5/23/1933  MRN. [id number]     Primary care provider. Michael Colvin MD  (Tel: [phone])    Admit date: 9/29/2021    Discharge date (blank if same as Note Date): 10/7/2021  Note Date: 10/10/2021     Reason for Hospitalization. Chief Complaint   Patient presents with    GI Bleeding     at adult  and they called and told her that she had clots, pt takes aspirin,depends saturated           [facility] Course. 1. Acute GI bleed  -Patient this is likely secondary diverticular  CTA negative-x3  Patient underwent EGD normal colonoscopy just pandiverticulosis no active bleeding  -Patient high risk for repeat colonoscopy  -Received 5 units of RBCs since admission last transfusion yesterday  -hgb has removed stable  - RBC scan no acute findings  - discharge stable with hemoglobin at 7.9 at the time of discharge hemodynamically stable    Consults. IP CONSULT TO SOCIAL WORK  IP CONSULT TO GI  IP CONSULT TO PALLIATIVE CARE  IP CONSULT TO HOME CARE NEEDS  IP CONSULT TO HOME CARE NEEDS    Physical examination on discharge day. BP (!) 130/47   Pulse 71   Temp 97.8 °F (36.6 °C) (Temporal)   Resp 14   Ht 5' 4\" (1.626 m)   Wt 126 lb 3.2 oz (57.2 kg)   SpO2 100%   BMI 21.66 kg/m²   General appearance. Alert. Looks comfortable. HEENT. Sclera clear. Moist mucus membranes. Cardiovascular. Regular rate and rhythm, normal S1, S2. No murmur. Respiratory. Not using accessory muscles. Clear to auscultation bilaterally, no wheeze. Gastrointestinal. Abdomen soft, non-tender, not distended, normal bowel sounds  Neurology. Facial symmetry. No speech deficits. Moving all extremities equally. Extremities. No edema in lower extremities. Skin.  Warm, dry, normal turgor    Condition at time of discharge stable

## 2023-01-01 ENCOUNTER — APPOINTMENT (OUTPATIENT)
Dept: GENERAL RADIOLOGY | Age: 88
End: 2023-01-01
Payer: MEDICARE

## 2023-01-01 ENCOUNTER — APPOINTMENT (OUTPATIENT)
Dept: CT IMAGING | Age: 88
End: 2023-01-01
Payer: MEDICARE

## 2023-01-01 ENCOUNTER — HOSPITAL ENCOUNTER (EMERGENCY)
Age: 88
End: 2023-03-29
Attending: EMERGENCY MEDICINE
Payer: MEDICARE

## 2023-01-01 VITALS
DIASTOLIC BLOOD PRESSURE: 70 MMHG | TEMPERATURE: 99.5 F | RESPIRATION RATE: 58 BRPM | OXYGEN SATURATION: 64 % | WEIGHT: 136 LBS | SYSTOLIC BLOOD PRESSURE: 157 MMHG | BODY MASS INDEX: 23.34 KG/M2 | HEART RATE: 97 BPM

## 2023-01-01 DIAGNOSIS — I47.20 VENTRICULAR TACHYCARDIA (HCC): ICD-10-CM

## 2023-01-01 DIAGNOSIS — I46.9 CARDIAC ARREST (HCC): Primary | ICD-10-CM

## 2023-01-01 DIAGNOSIS — G93.40 ENCEPHALOPATHY: ICD-10-CM

## 2023-01-01 LAB
ALBUMIN SERPL-MCNC: 1.6 G/DL (ref 3.4–5)
ALBUMIN/GLOB SERPL: 1.1 {RATIO} (ref 1.1–2.2)
ALP SERPL-CCNC: 46 U/L (ref 40–129)
ALT SERPL-CCNC: 795 U/L (ref 10–40)
ANION GAP SERPL CALCULATED.3IONS-SCNC: 17 MMOL/L (ref 3–16)
APTT BLD: 44.6 SEC (ref 22.7–35.9)
AST SERPL-CCNC: 804 U/L (ref 15–37)
BASE EXCESS BLDA CALC-SCNC: -21.7 MMOL/L (ref -3–3)
BASOPHILS # BLD: 0 K/UL (ref 0–0.2)
BASOPHILS NFR BLD: 0 %
BILIRUB SERPL-MCNC: <0.2 MG/DL (ref 0–1)
BILIRUB UR QL STRIP.AUTO: NEGATIVE
BUN SERPL-MCNC: 19 MG/DL (ref 7–20)
CALCIUM SERPL-MCNC: 7.4 MG/DL (ref 8.3–10.6)
CHLORIDE SERPL-SCNC: 99 MMOL/L (ref 99–110)
CLARITY UR: CLEAR
CO2 BLDA-SCNC: 26.7 MMOL/L
CO2 SERPL-SCNC: 11 MMOL/L (ref 21–32)
COHGB MFR BLDA: 0.3 % (ref 0–1.5)
COLOR UR: YELLOW
CREAT SERPL-MCNC: 1.1 MG/DL (ref 0.6–1.2)
DEPRECATED RDW RBC AUTO: 18.4 % (ref 12.4–15.4)
EKG ATRIAL RATE: 140 BPM
EKG ATRIAL RATE: 75 BPM
EKG DIAGNOSIS: NORMAL
EKG DIAGNOSIS: NORMAL
EKG P AXIS: 83 DEGREES
EKG P-R INTERVAL: 294 MS
EKG Q-T INTERVAL: 310 MS
EKG Q-T INTERVAL: 486 MS
EKG QRS DURATION: 110 MS
EKG QRS DURATION: 162 MS
EKG QTC CALCULATION (BAZETT): 473 MS
EKG QTC CALCULATION (BAZETT): 542 MS
EKG R AXIS: 70 DEGREES
EKG R AXIS: 75 DEGREES
EKG T AXIS: -34 DEGREES
EKG T AXIS: -35 DEGREES
EKG VENTRICULAR RATE: 140 BPM
EKG VENTRICULAR RATE: 75 BPM
EOSINOPHIL # BLD: 0.1 K/UL (ref 0–0.6)
EOSINOPHIL NFR BLD: 1 %
GFR SERPLBLD CREATININE-BSD FMLA CKD-EPI: 48 ML/MIN/{1.73_M2}
GLUCOSE BLD-MCNC: 80 MG/DL (ref 70–99)
GLUCOSE BLD-MCNC: 88 MG/DL (ref 70–99)
GLUCOSE SERPL-MCNC: 910 MG/DL (ref 70–99)
GLUCOSE UR STRIP.AUTO-MCNC: NEGATIVE MG/DL
HCO3 BLDA-SCNC: 10.3 MMOL/L (ref 21–29)
HCT VFR BLD AUTO: 41.6 % (ref 36–48)
HGB BLD-MCNC: 12.2 G/DL (ref 12–16)
HGB BLDA-MCNC: 8.6 G/DL (ref 12–16)
HGB UR QL STRIP.AUTO: NEGATIVE
INR PPP: 1.41 (ref 0.84–1.16)
KETONES UR STRIP.AUTO-MCNC: NEGATIVE MG/DL
LACTATE BLDV-SCNC: 12.7 MMOL/L (ref 0.4–1.9)
LEUKOCYTE ESTERASE UR QL STRIP.AUTO: NEGATIVE
LYMPHOCYTES # BLD: 6.4 K/UL (ref 1–5.1)
LYMPHOCYTES NFR BLD: 54 %
MCH RBC QN AUTO: 25.8 PG (ref 26–34)
MCHC RBC AUTO-ENTMCNC: 29.3 G/DL (ref 31–36)
MCV RBC AUTO: 88 FL (ref 80–100)
METHGB MFR BLDA: 0.8 %
MONOCYTES # BLD: 0.4 K/UL (ref 0–1.3)
MONOCYTES NFR BLD: 3 %
NEUTROPHILS # BLD: 5 K/UL (ref 1.7–7.7)
NEUTROPHILS NFR BLD: 42 %
NITRITE UR QL STRIP.AUTO: NEGATIVE
NRBC BLD-RTO: 1 /100 WBC
NT-PROBNP SERPL-MCNC: 1796 PG/ML (ref 0–449)
O2 THERAPY: ABNORMAL
PATH INTERP BLD-IMP: YES
PCO2 BLDA: 53.4 MMHG (ref 35–45)
PERFORMED ON: NORMAL
PERFORMED ON: NORMAL
PH BLDA: 6.89 [PH] (ref 7.35–7.45)
PH UR STRIP.AUTO: 7 [PH] (ref 5–8)
PLATELET # BLD AUTO: 206 K/UL (ref 135–450)
PLATELET BLD QL SMEAR: ADEQUATE
PMV BLD AUTO: 9.9 FL (ref 5–10.5)
PO2 BLDA: 156 MMHG (ref 75–108)
POTASSIUM SERPL-SCNC: 3.2 MMOL/L (ref 3.5–5.1)
PROT SERPL-MCNC: 3.1 G/DL (ref 6.4–8.2)
PROT UR STRIP.AUTO-MCNC: NEGATIVE MG/DL
PROTHROMBIN TIME: 17.2 SEC (ref 11.5–14.8)
RBC # BLD AUTO: 4.72 M/UL (ref 4–5.2)
RBC MORPH BLD: NORMAL
REASON FOR REJECTION: NORMAL
REJECTED TEST: NORMAL
SAO2 % BLDA: 96.9 %
SLIDE REVIEW: ABNORMAL
SODIUM SERPL-SCNC: 127 MMOL/L (ref 136–145)
SP GR UR STRIP.AUTO: 1.01 (ref 1–1.03)
TROPONIN T SERPL-MCNC: 0.27 NG/ML
UA COMPLETE W REFLEX CULTURE PNL UR: NORMAL
UA DIPSTICK W REFLEX MICRO PNL UR: NORMAL
URN SPEC COLLECT METH UR: NORMAL
UROBILINOGEN UR STRIP-ACNC: 0.2 E.U./DL
WBC # BLD AUTO: 11.8 K/UL (ref 4–11)

## 2023-01-01 PROCEDURE — 81003 URINALYSIS AUTO W/O SCOPE: CPT

## 2023-01-01 PROCEDURE — 93005 ELECTROCARDIOGRAM TRACING: CPT | Performed by: EMERGENCY MEDICINE

## 2023-01-01 PROCEDURE — 93010 ELECTROCARDIOGRAM REPORT: CPT | Performed by: INTERNAL MEDICINE

## 2023-01-01 PROCEDURE — 87150 DNA/RNA AMPLIFIED PROBE: CPT

## 2023-01-01 PROCEDURE — 94761 N-INVAS EAR/PLS OXIMETRY MLT: CPT

## 2023-01-01 PROCEDURE — 83880 ASSAY OF NATRIURETIC PEPTIDE: CPT

## 2023-01-01 PROCEDURE — 94002 VENT MGMT INPAT INIT DAY: CPT

## 2023-01-01 PROCEDURE — 85730 THROMBOPLASTIN TIME PARTIAL: CPT

## 2023-01-01 PROCEDURE — 87040 BLOOD CULTURE FOR BACTERIA: CPT

## 2023-01-01 PROCEDURE — 6360000002 HC RX W HCPCS: Performed by: EMERGENCY MEDICINE

## 2023-01-01 PROCEDURE — 84484 ASSAY OF TROPONIN QUANT: CPT

## 2023-01-01 PROCEDURE — 36600 WITHDRAWAL OF ARTERIAL BLOOD: CPT

## 2023-01-01 PROCEDURE — 6360000002 HC RX W HCPCS

## 2023-01-01 PROCEDURE — 96375 TX/PRO/DX INJ NEW DRUG ADDON: CPT

## 2023-01-01 PROCEDURE — 36415 COLL VENOUS BLD VENIPUNCTURE: CPT

## 2023-01-01 PROCEDURE — 80053 COMPREHEN METABOLIC PANEL: CPT

## 2023-01-01 PROCEDURE — 99285 EMERGENCY DEPT VISIT HI MDM: CPT

## 2023-01-01 PROCEDURE — 2580000003 HC RX 258: Performed by: EMERGENCY MEDICINE

## 2023-01-01 PROCEDURE — 31500 INSERT EMERGENCY AIRWAY: CPT

## 2023-01-01 PROCEDURE — 2700000000 HC OXYGEN THERAPY PER DAY

## 2023-01-01 PROCEDURE — 96374 THER/PROPH/DIAG INJ IV PUSH: CPT

## 2023-01-01 PROCEDURE — 96376 TX/PRO/DX INJ SAME DRUG ADON: CPT

## 2023-01-01 PROCEDURE — 85610 PROTHROMBIN TIME: CPT

## 2023-01-01 PROCEDURE — 85025 COMPLETE CBC W/AUTO DIFF WBC: CPT

## 2023-01-01 PROCEDURE — 36556 INSERT NON-TUNNEL CV CATH: CPT

## 2023-01-01 PROCEDURE — 2500000003 HC RX 250 WO HCPCS: Performed by: EMERGENCY MEDICINE

## 2023-01-01 PROCEDURE — 83605 ASSAY OF LACTIC ACID: CPT

## 2023-01-01 PROCEDURE — 82803 BLOOD GASES ANY COMBINATION: CPT

## 2023-01-01 PROCEDURE — 96365 THER/PROPH/DIAG IV INF INIT: CPT

## 2023-01-01 RX ORDER — MIDAZOLAM HYDROCHLORIDE 5 MG/ML
INJECTION INTRAMUSCULAR; INTRAVENOUS
Status: COMPLETED
Start: 2023-01-01 | End: 2023-01-01

## 2023-01-01 RX ORDER — ATROPINE SULFATE 0.1 MG/ML
INJECTION INTRAVENOUS DAILY PRN
Status: COMPLETED | OUTPATIENT
Start: 2023-01-01 | End: 2023-01-01

## 2023-01-01 RX ORDER — EPINEPHRINE IN SOD CHLOR,ISO 1 MG/10 ML
SYRINGE (ML) INTRAVENOUS DAILY PRN
Status: COMPLETED | OUTPATIENT
Start: 2023-01-01 | End: 2023-01-01

## 2023-01-01 RX ORDER — DOPAMINE HYDROCHLORIDE 160 MG/100ML
INJECTION, SOLUTION INTRAVENOUS CONTINUOUS PRN
Status: COMPLETED | OUTPATIENT
Start: 2023-01-01 | End: 2023-01-01

## 2023-01-01 RX ORDER — NOREPINEPHRINE BITARTRATE 0.06 MG/ML
1-100 INJECTION, SOLUTION INTRAVENOUS CONTINUOUS
Status: DISCONTINUED | OUTPATIENT
Start: 2023-01-01 | End: 2023-01-01 | Stop reason: HOSPADM

## 2023-01-01 RX ORDER — MIDAZOLAM HYDROCHLORIDE 1 MG/ML
1-10 INJECTION, SOLUTION INTRAVENOUS CONTINUOUS
Status: DISCONTINUED | OUTPATIENT
Start: 2023-01-01 | End: 2023-01-01 | Stop reason: HOSPADM

## 2023-01-01 RX ORDER — FENTANYL CITRATE 50 UG/ML
INJECTION, SOLUTION INTRAMUSCULAR; INTRAVENOUS
Status: DISCONTINUED
Start: 2023-01-01 | End: 2023-01-01 | Stop reason: HOSPADM

## 2023-01-01 RX ORDER — 0.9 % SODIUM CHLORIDE 0.9 %
1000 INTRAVENOUS SOLUTION INTRAVENOUS ONCE
Status: COMPLETED | OUTPATIENT
Start: 2023-01-01 | End: 2023-01-01

## 2023-01-01 RX ORDER — DOPAMINE HYDROCHLORIDE 160 MG/100ML
1-20 INJECTION, SOLUTION INTRAVENOUS CONTINUOUS
Status: DISCONTINUED | OUTPATIENT
Start: 2023-01-01 | End: 2023-01-01 | Stop reason: HOSPADM

## 2023-01-01 RX ORDER — ATROPINE SULFATE 0.1 MG/ML
1 INJECTION INTRAVENOUS ONCE
Status: DISCONTINUED | OUTPATIENT
Start: 2023-01-01 | End: 2023-01-01 | Stop reason: HOSPADM

## 2023-01-01 RX ORDER — 0.9 % SODIUM CHLORIDE 0.9 %
2000 INTRAVENOUS SOLUTION INTRAVENOUS ONCE
Status: COMPLETED | OUTPATIENT
Start: 2023-01-01 | End: 2023-01-01

## 2023-01-01 RX ORDER — CALCIUM CHLORIDE 100 MG/ML
INJECTION INTRAVENOUS; INTRAVENTRICULAR DAILY PRN
Status: COMPLETED | OUTPATIENT
Start: 2023-01-01 | End: 2023-01-01

## 2023-01-01 RX ORDER — MAGNESIUM SULFATE HEPTAHYDRATE 500 MG/ML
INJECTION, SOLUTION INTRAMUSCULAR; INTRAVENOUS DAILY PRN
Status: COMPLETED | OUTPATIENT
Start: 2023-01-01 | End: 2023-01-01

## 2023-01-01 RX ORDER — FENTANYL CITRATE-0.9 % NACL/PF 10 MCG/ML
25-200 PLASTIC BAG, INJECTION (ML) INTRAVENOUS CONTINUOUS
Status: DISCONTINUED | OUTPATIENT
Start: 2023-01-01 | End: 2023-01-01 | Stop reason: SDUPTHER

## 2023-01-01 RX ORDER — ETOMIDATE 2 MG/ML
20 INJECTION INTRAVENOUS ONCE
Status: DISCONTINUED | OUTPATIENT
Start: 2023-01-01 | End: 2023-01-01 | Stop reason: HOSPADM

## 2023-01-01 RX ADMIN — EPINEPHRINE 1 MG: 0.1 INJECTION, SOLUTION ENDOTRACHEAL; INTRACARDIAC; INTRAVENOUS at 09:31

## 2023-01-01 RX ADMIN — HYDROCORTISONE SODIUM SUCCINATE 100 MG: 100 INJECTION, POWDER, FOR SOLUTION INTRAMUSCULAR; INTRAVENOUS at 09:58

## 2023-01-01 RX ADMIN — AMIODARONE HYDROCHLORIDE 1 MG/MIN: 50 INJECTION, SOLUTION INTRAVENOUS at 09:57

## 2023-01-01 RX ADMIN — EPINEPHRINE 1 MG: 0.1 INJECTION, SOLUTION ENDOTRACHEAL; INTRACARDIAC; INTRAVENOUS at 10:02

## 2023-01-01 RX ADMIN — EPINEPHRINE 1 MG: 0.1 INJECTION, SOLUTION ENDOTRACHEAL; INTRACARDIAC; INTRAVENOUS at 09:33

## 2023-01-01 RX ADMIN — EPINEPHRINE 1 MG: 0.1 INJECTION, SOLUTION ENDOTRACHEAL; INTRACARDIAC; INTRAVENOUS at 08:48

## 2023-01-01 RX ADMIN — EPINEPHRINE 1 MG: 0.1 INJECTION, SOLUTION ENDOTRACHEAL; INTRACARDIAC; INTRAVENOUS at 10:06

## 2023-01-01 RX ADMIN — CALCIUM CHLORIDE 1000 MG: 100 INJECTION INTRAVENOUS; INTRAVENTRICULAR at 08:53

## 2023-01-01 RX ADMIN — EPINEPHRINE 1 MG: 0.1 INJECTION, SOLUTION ENDOTRACHEAL; INTRACARDIAC; INTRAVENOUS at 09:01

## 2023-01-01 RX ADMIN — EPINEPHRINE 1 MG: 0.1 INJECTION, SOLUTION ENDOTRACHEAL; INTRACARDIAC; INTRAVENOUS at 09:37

## 2023-01-01 RX ADMIN — DOPAMINE HYDROCHLORIDE 20 MCG/KG/MIN: 160 INJECTION, SOLUTION INTRAVENOUS at 10:05

## 2023-01-01 RX ADMIN — EPINEPHRINE 1 MG: 0.1 INJECTION, SOLUTION ENDOTRACHEAL; INTRACARDIAC; INTRAVENOUS at 08:58

## 2023-01-01 RX ADMIN — EPINEPHRINE 1 MG: 0.1 INJECTION, SOLUTION ENDOTRACHEAL; INTRACARDIAC; INTRAVENOUS at 08:52

## 2023-01-01 RX ADMIN — EPINEPHRINE 1 MG: 0.1 INJECTION, SOLUTION ENDOTRACHEAL; INTRACARDIAC; INTRAVENOUS at 08:45

## 2023-01-01 RX ADMIN — SODIUM CHLORIDE 1000 ML: 9 INJECTION, SOLUTION INTRAVENOUS at 09:51

## 2023-01-01 RX ADMIN — Medication 10 MCG/MIN: at 09:20

## 2023-01-01 RX ADMIN — SODIUM BICARBONATE 50 MEQ: 84 INJECTION, SOLUTION INTRAVENOUS at 08:49

## 2023-01-01 RX ADMIN — SODIUM CHLORIDE 2000 ML: 9 INJECTION, SOLUTION INTRAVENOUS at 08:30

## 2023-01-01 RX ADMIN — ATROPINE SULFATE 1 MG: 0.1 INJECTION, SOLUTION ENDOTRACHEAL; INTRAMUSCULAR; INTRAVENOUS; SUBCUTANEOUS at 10:01

## 2023-01-01 RX ADMIN — AMIODARONE HYDROCHLORIDE 150 MG: 50 INJECTION, SOLUTION INTRAVENOUS at 09:44

## 2023-01-01 RX ADMIN — MAGNESIUM SULFATE HEPTAHYDRATE 1000 MG: 500 INJECTION, SOLUTION INTRAMUSCULAR; INTRAVENOUS at 08:56

## 2023-01-01 RX ADMIN — MIDAZOLAM 5 MG: 5 INJECTION INTRAMUSCULAR; INTRAVENOUS at 09:10

## 2023-01-01 ASSESSMENT — PULMONARY FUNCTION TESTS: PIF_VALUE: 21

## 2023-03-29 NOTE — ED PROVIDER NOTES
EMERGENCY MEDICINE PROVIDER NOTE    Patient Identification  Pt Name: Gracy Phalen  MRN: [id number]  [place] 5/23/1933  Date of evaluation: 3/29/2023  Provider: Smooth Keenan MD  PCP: Rajiv Cosme MD    Chief Complaint  Altered Mental Status (Pt to ED via EMS from home. Pt is not following commands, gasping, and is not responding to painful stimuli. Per EMS pt has not been eating or drinking well lately. Per family pt got her self up to the restroom last night at 7:30 pm. PT BS en route 122. Pt found to be hypotensive. MD called to bedside.)      HPI  (History provided by {Persons; family members:[id number]})  This is a 80 y.o. female who was brought in by {J:[id number]} for ***. I have reviewed the following nursing documentation:  Allergies: Patient has no known allergies.     Past medical history:   Past Medical History:   Diagnosis Date    Chronic back pain     CKD (chronic kidney disease) stage 3, GFR 30-59 ml/min (HCC)     Dementia (HCC)     often non-verbal    Hypertension      Past surgical history:   Past Surgical History:   Procedure Laterality Date    COLONOSCOPY N/A 9/30/2021    COLONOSCOPY DIAGNOSTIC performed by Bernard Ramírez MD at [address] [place] [address] 4/2/2020    REPAIR OF INCARCERATED LEFT INGUINAL HERNIA WITH  MESH performed by Eusebio Bautista MD at [address] [place] [address] GASTROINTESTINAL ENDOSCOPY N/A 9/30/2021    EGD DIAGNOSTIC ONLY performed by Bernard Ramírez MD at [facility] medications:   Discharge Medication List as of 3/29/2023 11:15 AM        CONTINUE these medications which have NOT CHANGED    Details   losartan (COZAAR) 50 MG tablet Take 1 tablet by mouth daily, Disp-30 tablet, R-3Normal      metoprolol succinate (TOPROL XL) 25 MG extended release tablet Take 1 tablet by mouth daily, Disp-30 tablet, R-0Normal      pantoprazole (PROTONIX) 40 MG tablet Take 1 tablet by mouth every morning (before Reflex to Culture Not Indicated    Lactate, Sepsis   Result Value Ref Range    Lactic Acid, Sepsis 12.7 (HH) 0.4 - 1.9 mmol/L   SPECIMEN REJECTION   Result Value Ref Range    Rejected Test cmpx     Reason for Rejection see below    Comprehensive Metabolic Panel w/ Reflex to MG   Result Value Ref Range    Sodium 127 (L) 136 - 145 mmol/L    Potassium reflex Magnesium 3.2 (L) 3.5 - 5.1 mmol/L    Chloride 99 99 - 110 mmol/L    CO2 11 (LL) 21 - 32 mmol/L    Anion Gap 17 (H) 3 - 16    Glucose 910 (HH) 70 - 99 mg/dL    BUN 19 7 - 20 mg/dL    Creatinine 1.1 0.6 - 1.2 mg/dL    Est, Glom Filt Rate 48 (A) >60    Calcium 7.4 (L) 8.3 - 10.6 mg/dL    Total Protein 3.1 (L) 6.4 - 8.2 g/dL    Albumin 1.6 (L) 3.4 - 5.0 g/dL    Albumin/Globulin Ratio 1.1 1.1 - 2.2    Total Bilirubin <0.2 0.0 - 1.0 mg/dL    Alkaline Phosphatase 46 40 - 129 U/L     (H) 10 - 40 U/L     (H) 15 - 37 U/L   Troponin   Result Value Ref Range    Troponin 0.27 (H) <0.01 ng/mL   Brain Natriuretic Peptide   Result Value Ref Range    Pro-BNP 1,796 (H) 0 - 449 pg/mL   Blood gas, arterial   Result Value Ref Range    pH, Arterial 6.893 (LL) 7.350 - 7.450    pCO2, Arterial 53.4 (H) 35.0 - 45.0 mmHg    pO2, Arterial 156.0 (H) 75.0 - 108.0 mmHg    HCO3, Arterial 10.3 (L) 21.0 - 29.0 mmol/L    Base Excess, Arterial -21.7 (L) -3.0 - 3.0 mmol/L    Hemoglobin, Art, Extended 8.6 (L) 12.0 - 16.0 g/dL    O2 Sat, Arterial 96.9 >92 %    Carboxyhgb, Arterial 0.3 0.0 - 1.5 %    Methemoglobin, Arterial 0.8 <1.5 %    TCO2, Arterial 26.7 Not Established mmol/L    O2 Therapy Unknown    POCT Glucose   Result Value Ref Range    POC Glucose 88 70 - 99 mg/dl    Performed on ACCU-CHEK    POCT Glucose   Result Value Ref Range    POC Glucose 80 70 - 99 mg/dl    Performed on ACCU-CHEK    EKG 12 Lead   Result Value Ref Range    Ventricular Rate 140 BPM    Atrial Rate 140 BPM    QRS Duration 162 ms    Q-T Interval 310 ms    QTc Calculation (Bazett) 473 ms    R Axis 70 degrees    T

## 2023-03-29 NOTE — ED NOTES
Daughter was brought to room during code. Daughter made decision to stop all efforts to keep patient alive. Time of Death 1007. Pt daughter provided emotional support and privacy.       Raúl Lofton RN  03/29/23 [address], RN  03/29/23 7817

## 2023-03-29 NOTE — ED NOTES
Ref # [id number]  [facility] center hold     Jaspreet Antonio, LESTER  03/29/23 1013

## 2023-03-29 NOTE — ED NOTES
Spoke to Dr. Jesi Mims PCP. Update provided on patients outcome.       Peri Chávez RN  03/29/23 [id number]

## 2023-03-29 NOTE — ED NOTES
Spoke with  Nikolay Richards, not a coroners case     Davina Ramirez RN  03/29/23 LESTER Ambrose  03/29/23 1676

## 2023-03-29 NOTE — ED NOTES
Patient coded while in CT. Compressions initiated. PT was not able to have CT scans. PT moved to room 1. MD to bedside.       Pamela Engel RN  03/29/23 1011

## 2023-03-30 LAB
PATH INTERP BLD-IMP: NORMAL
REPORT: NORMAL

## 2023-04-01 LAB
BACTERIA BLD CULT: ABNORMAL
ORGANISM: ABNORMAL

## 2023-04-02 LAB — BACTERIA BLD CULT ORG #2: NORMAL

## 2024-10-20 NOTE — CONSULTS
Palliative Care:      a 80 y.o. female who presented with GI bleed. Patient was at adult  when she noted some clots. Patient said noted some bright red blood and clots in the stool. On the depends. Patient does take aspirin. Denies any significant symptoms. A couple episodes prior to ED reported. Admitted for symptom management on 9/29/21 and Palliative consult placed 10/1/21. Past Medical History:   has a past medical history of Chronic back pain, CKD (chronic kidney disease) stage 3, GFR 30-59 ml/min ([facility] [address] Dementia ([facility] [address] and Hypertension. Past Surgical History:   has a past surgical history that includes Toe Surgery; Hysterectomy, total abdominal; hernia repair (Left, 4/2/2020); Upper gastrointestinal endoscopy (N/A, 9/30/2021); and Colonoscopy (N/A, 9/30/2021). Advance Directives: Full Code         Problem Severity: Pain/Other Symptoms:   GI bleed. Disorientated to time/situation      Bed Mobility/Toileting/Transfer:    PT 15/24 and OT   17/24    Performance Status:        Palliative Performance Scale:  100% []Full Normal activity & work No evidence of disease  90%   [] Full Normal activity & work Some evidence of disease  80%   [] Full Normal activity with Effort Some evidence of disease  70%   [] Reduced Unable Normal Job/Work Significant disease Full Normal or reduced  60%   [] Ambulation reduced; Significant disease; Can't do hobbies/housework; intake normal   or reduced; occasional assist; LOC full/confusion  50%   [x] Mainly sit/lie; Extensive disease; Can't do any work; Considerable assist; intake normal  Or reduced; LOC full/confusion  40%   [] Mainly in bed; Extensive disease; Mainly assist; intake normal or reduced; occasional assist; LOC full/confusion  30%   [] Bed Bound; Extensive disease; Total care; intake reduced; LOC full/confusion  20%   [] Bed Bound; Extensive disease; Total care; intake minimal; Drowsy/coma  10%   [] Bed Bound; Extensive disease;  Total care; Mouth care only; Drowsy/coma    PPS 50%    Symptom Assessment: Appetite/Nausea/Bowels/Fatigue:   lbs. Albumin  2.5 Poor appetite reported. Social History:   reports that she has never smoked. She has never used smokeless tobacco. She reports that she does not drink alcohol and does not use drugs. Family History:  family history includes Diabetes in her mother and sister. Psychological/Spiritual:  .  following and visited with both patient and daughter on 10/1/21. Family Discussion:     Patient stable and able to discharge home. VSS. 02 saturation 97%. Denies any complaints. Hgb   7.3 as of today. No change in code status. Daughter states wishes are full code. Patient pending DC to home. Family in agreement with home care. Denies any palliative care to follow at this time. Assistance OOB with selected safe patient handling equipment/Communicate Fall Risk and Risk Factors to all staff, patient, and family/Discuss with provider need for PT consult/Monitor gait and stability/Provide patient with walking aids - walker, cane, crutches/Reinforce activity limits and safety measures with patient and family/Visual Cue: Yellow wristband/Bed in lowest position, wheels locked, appropriate side rails in place/Call bell, personal items and telephone in reach/Instruct patient to call for assistance before getting out of bed or chair/Non-slip footwear when patient is out of bed/[place] to call system/Physically safe environment - no spills, clutter or unnecessary equipment/Purposeful Proactive Rounding/Room/bathroom lighting operational, light cord in reach

## 2024-12-12 NOTE — PROGRESS NOTES
[facility] HOSPITALIST HISTORY AND PHYSICAL    Patient Identification:  Name:  Gregoria Pendleton  Age:  77 y.o.  Sex:  female  :  1947  MRN:  [id number]   Visit Number:  [id number]  Primary Care Physician:  Marisa Arias APRN       Chief complaint: Dysphagia (solids/liquids), GERD    History of presenting illness: Ms. Pendleton is a 77 y.o. female who presents today for EGD.  GERD has been a chronic issue for several years.  Of note, she retains her gallbladder.  At her recent clinic visit, omeprazole was increased to 40 mg p.o. twice daily which has been very helpful.  At present, she feels as if GERD is well-controlled.  She denies having any recent flares.  She also has chronic difficulty with dysphagia with both solids and liquids.  Of note, she has history of CVA in . Esophagram was obtained on 2024 which showed mild narrowing at the GE junction with delayed passage of the 13 mm barium tablet.  It did pass with 3 repeated swallows.  She denies nausea or vomiting.  Her weight has been stable.  She has no other complaints today.      Review of Systems   Constitutional: Negative.    HENT:  Positive for trouble swallowing.    Eyes: Negative.    Respiratory: Negative.     Cardiovascular: Negative.    Gastrointestinal: Negative.    Endocrine: Negative.    Genitourinary: Negative.    Musculoskeletal: Negative.    Allergic/Immunologic: Negative.    Neurological: Negative.    Hematological: Negative.         Past Medical History:   Diagnosis Date    Arthritis     Disease of thyroid gland     Elevated cholesterol     GERD (gastroesophageal reflux disease)     Hyperlipidemia     Hypertension     Migraine     Pulmonary fibrosis     Stroke      Past Surgical History:   Procedure Laterality Date    CYSTOSCOPY W/ LASER LITHOTRIPSY  01/15/2024    HYSTERECTOMY  age 36    TUBAL ABDOMINAL LIGATION Bilateral      Family History   Problem Relation Age of Onset    Heart failure Father     Heart  Patient had another episode of large bright red blood per rectum this evening. She was hypotensive, lightheadedness at a time. Blood pressure as low as systolic of 68V rate, evaluated at bedside, despite intravenous fluid bolus running. Repeat hemoglobin was 7.9 and I have recommended transfusion of 1 unit of PRBC. I discussed with on-call GI physician, recommends making patient n.p.o. overnight and GI will reevaluate in the morning. Discussed with nursing staff at bedside.       SIGNED: Shawnee Gainse MD, MPH . 9/30/2021 disease Sister     Heart attack Sister     Breast cancer Neg Hx      Social History     Socioeconomic History    Marital status:    Tobacco Use    Smoking status: Never     Passive exposure: Never    Smokeless tobacco: Never   Vaping Use    Vaping status: Never Used   Substance and Sexual Activity    Alcohol use: Never    Drug use: Never    Sexual activity: Defer       Allergies:  Ampicillin and Clonidine    Prior to Admission Medications       Prescriptions Last Dose Informant Patient Reported? Taking?    amlodipine-olmesartan (ARVIN) 5-40 MG per tablet 12/11/2024  Yes Yes    aspirin 81 MG chewable tablet Past Week Self Yes Yes    Chew 1 tablet Daily.    atenolol (TENORMIN) 25 MG tablet 12/11/2024 Self Yes Yes    Take 1 tablet by mouth Daily.    bumetanide (BUMEX) 1 MG tablet 12/11/2024  No Yes    Take 1 tablet by mouth Daily.    cetirizine (zyrTEC) 10 MG tablet 12/11/2024 Self Yes Yes    Take 1 tablet by mouth Daily.    cloNIDine (CATAPRES) 0.2 MG tablet 12/11/2024  Yes Yes    1 tablet 2 (Two) Times a Day.    DULoxetine (CYMBALTA) 60 MG capsule 12/11/2024  Yes Yes    Take 1 capsule by mouth Daily.    Emgality 120 MG/ML auto-injector pen Past Month  Yes Yes    estradiol (ESTRACE) 1 MG tablet 12/11/2024 Self Yes Yes    Take 1 tablet by mouth Daily.    levothyroxine (SYNTHROID, LEVOTHROID) 100 MCG tablet 12/11/2024 Self Yes Yes    Take 75 mcg by mouth Daily.    Linzess 145 MCG capsule capsule 12/11/2024  Yes Yes    lovastatin (MEVACOR) 20 MG tablet 12/11/2024  Yes Yes    neomycin-polymyxin-dexamethamethasone (POLYDEX) 3.5-52627-2.1 ointment ophthalmic ointment Past Month  Yes Yes    nystatin (MYCOSTATIN) 100,000 unit/mL suspension Past Month  No Yes    Take 5 mL by mouth 4 (Four) Times a Day.    omega-3 acid ethyl esters (LOVAZA) 1 g capsule 12/11/2024  Yes Yes    omeprazole (priLOSEC) 40 MG capsule 12/11/2024  No Yes    Take 1 capsule by mouth 2 (Two) Times a Day.    ondansetron (Zofran) 4 MG tablet 12/11/2024   No Yes    Take 1 tablet by mouth Every 8 (Eight) Hours As Needed for Nausea or Vomiting.    rOPINIRole (REQUIP) 0.5 MG tablet Past Week  Yes Yes    spironolactone (ALDACTONE) 50 MG tablet Past Week  Yes Yes    Take 1 tablet by mouth 2 (Two) Times a Day.          Hospital Scheduled Meds:  lactated ringers, 125 mL/hr, Intravenous, Once  sodium chloride, 10 mL, Intravenous, Q12H    Vital Signs:  Temp:  [97.1 °F (36.2 °C)] 97.1 °F (36.2 °C)  Heart Rate:  [75] 75  Resp:  [18] 18  BP: (129)/(65) 129/65      12/12/24  0748   Weight: 72.6 kg (160 lb)     Body mass index is 29.26 kg/m².    Physical Exam:  Constitutional:  Alert and oriented. Well developed and well nourished, in no acute distress.  HENT:  Head: Normocephalic and atraumatic.  Mouth:  Moist mucous membranes.  OP clear, mmm  Eyes:  Conjunctivae and EOM are normal.  Pupils are equal, round, and reactive to light.  No scleral icterus.  Neck:  Neck supple.  No JVD present.    Cardiovascular:  RRR, no MRG.  Pulmonary/Chest:  CTAB, unlabored.   Abdominal:  Soft.  Bowel sounds are normal.  No distension and no tenderness.   Musculoskeletal:  No edema, no tenderness, and no deformity.   Neurological:  MS as above, grossly nonfocal exam   Psychiatric:  Normal mood and affect.  Behavior is normal.  Judgment and thought content normal.       Assessment and Plan:  1.  Proceed with EGD for evaluation of GERD,  dysphagia (solids/liquids) and narrowing at the GE junction.    Geneva Lance, SHAD  12/12/24  08:05 EST

## (undated) DEVICE — MERCY FAIRFIELD TURNOVER KIT: Brand: MEDLINE INDUSTRIES, INC.

## (undated) DEVICE — TOTAL TRAY, DB, 100% SILI FOLEY, 16FR 10: Brand: MEDLINE

## (undated) DEVICE — PENROSE DRAIN 12" X 1/2": Brand: CARDINAL HEALTH

## (undated) DEVICE — SUTURE VCRL SZ 2-0 L27IN ABSRB UD L26MM SH 1/2 CIR J417H

## (undated) DEVICE — SYRINGE MED 10ML TRNSLUC BRL PLUNG BLK MRK POLYPR CTRL

## (undated) DEVICE — MOUTHPIECE ENDOSCP L CTRL OPN AND SIDE PORTS DISP

## (undated) DEVICE — SUTURE PROL SZ 0 L18IN NONABSORBABLE BLU MO-6 L26MM 1/2 CIR C845G

## (undated) DEVICE — SOLUTION IV IRRIG WATER 500ML POUR BRL ST 2F7113

## (undated) DEVICE — SUTURE VCRL SZ 2-0 L18IN ABSRB UD POLYGLACTIN 910 BRAID TIE J111T

## (undated) DEVICE — STERILE POLYISOPRENE POWDER-FREE SURGICAL GLOVES: Brand: PROTEXIS

## (undated) DEVICE — SET VLV 3 PC AWS DISPOSABLE GRDIAN SCOPEVALET

## (undated) DEVICE — ADHESIVE SKIN CLSR 0.7ML TOP DERMBND ADV

## (undated) DEVICE — BW-412T DISP COMBO CLEANING BRUSH: Brand: SINGLE USE COMBINATION CLEANING BRUSH

## (undated) DEVICE — SOLUTION IV IRRIG POUR BRL 0.9% SODIUM CHL 2F7124

## (undated) DEVICE — NEEDLE HYPO 22GA L1.5IN BLK POLYPR HUB S STL REG BVL STR

## (undated) DEVICE — PROCEDURE KIT ENDOSCP CUST

## (undated) DEVICE — SUTURE COAT VCRL SZ 4-0 L18IN ABSRB UD L19MM PS-2 1/2 CIR J496G

## (undated) DEVICE — BLADE CLIPPER GEN PURP NS

## (undated) DEVICE — CHLORAPREP 26ML ORANGE

## (undated) DEVICE — GAUZE,SPONGE,4"X4",8PLY,STRL,LF,10/TRAY: Brand: MEDLINE

## (undated) DEVICE — SUTURE VCRL SZ 3-0 L27IN ABSRB UD L26MM SH 1/2 CIR J416H

## (undated) DEVICE — SUTURE PROL SZ 2-0 L36IN NONABSORBABLE BLU SH L26MM 1/2 CIR 8523H

## (undated) DEVICE — SHEET, T, LAPAROTOMY, STERILE: Brand: MEDLINE

## (undated) DEVICE — GOWN AURORA NONREINF LG: Brand: MEDLINE INDUSTRIES, INC.

## (undated) DEVICE — SHEET,DRAPE,40X58,STERILE: Brand: MEDLINE